# Patient Record
Sex: MALE | Race: WHITE | NOT HISPANIC OR LATINO | Employment: OTHER | URBAN - METROPOLITAN AREA
[De-identification: names, ages, dates, MRNs, and addresses within clinical notes are randomized per-mention and may not be internally consistent; named-entity substitution may affect disease eponyms.]

---

## 2018-01-29 ENCOUNTER — HISTORICAL (OUTPATIENT)
Dept: LAB | Facility: HOSPITAL | Age: 83
End: 2018-01-29

## 2018-02-02 ENCOUNTER — HISTORICAL (OUTPATIENT)
Dept: ADMINISTRATIVE | Facility: HOSPITAL | Age: 83
End: 2018-02-02

## 2018-02-09 ENCOUNTER — HISTORICAL (OUTPATIENT)
Dept: ADMINISTRATIVE | Facility: HOSPITAL | Age: 83
End: 2018-02-09

## 2018-02-20 ENCOUNTER — HISTORICAL (OUTPATIENT)
Dept: ADMINISTRATIVE | Facility: HOSPITAL | Age: 83
End: 2018-02-20

## 2018-02-27 ENCOUNTER — HISTORICAL (OUTPATIENT)
Dept: ADMINISTRATIVE | Facility: HOSPITAL | Age: 83
End: 2018-02-27

## 2018-03-06 ENCOUNTER — HISTORICAL (OUTPATIENT)
Dept: ADMINISTRATIVE | Facility: HOSPITAL | Age: 83
End: 2018-03-06

## 2018-03-13 ENCOUNTER — HISTORICAL (OUTPATIENT)
Dept: ADMINISTRATIVE | Facility: HOSPITAL | Age: 83
End: 2018-03-13

## 2018-03-23 ENCOUNTER — HISTORICAL (OUTPATIENT)
Dept: ADMINISTRATIVE | Facility: HOSPITAL | Age: 83
End: 2018-03-23

## 2018-03-26 LAB — FINAL CULTURE: NORMAL

## 2018-03-29 ENCOUNTER — HISTORICAL (OUTPATIENT)
Dept: ADMINISTRATIVE | Facility: HOSPITAL | Age: 83
End: 2018-03-29

## 2018-04-05 ENCOUNTER — HISTORICAL (OUTPATIENT)
Dept: ADMINISTRATIVE | Facility: HOSPITAL | Age: 83
End: 2018-04-05

## 2018-04-12 ENCOUNTER — HISTORICAL (OUTPATIENT)
Dept: ADMINISTRATIVE | Facility: HOSPITAL | Age: 83
End: 2018-04-12

## 2018-04-19 ENCOUNTER — HISTORICAL (OUTPATIENT)
Dept: ADMINISTRATIVE | Facility: HOSPITAL | Age: 83
End: 2018-04-19

## 2018-04-25 ENCOUNTER — HISTORICAL (OUTPATIENT)
Dept: ADMINISTRATIVE | Facility: HOSPITAL | Age: 83
End: 2018-04-25

## 2018-05-03 ENCOUNTER — HISTORICAL (OUTPATIENT)
Dept: ADMINISTRATIVE | Facility: HOSPITAL | Age: 83
End: 2018-05-03

## 2018-05-10 ENCOUNTER — HISTORICAL (OUTPATIENT)
Dept: ADMINISTRATIVE | Facility: HOSPITAL | Age: 83
End: 2018-05-10

## 2018-05-17 ENCOUNTER — HISTORICAL (OUTPATIENT)
Dept: ADMINISTRATIVE | Facility: HOSPITAL | Age: 83
End: 2018-05-17

## 2018-05-24 ENCOUNTER — HISTORICAL (OUTPATIENT)
Dept: ADMINISTRATIVE | Facility: HOSPITAL | Age: 83
End: 2018-05-24

## 2018-05-31 ENCOUNTER — HISTORICAL (OUTPATIENT)
Dept: ADMINISTRATIVE | Facility: HOSPITAL | Age: 83
End: 2018-05-31

## 2018-06-07 ENCOUNTER — HISTORICAL (OUTPATIENT)
Dept: ADMINISTRATIVE | Facility: HOSPITAL | Age: 83
End: 2018-06-07

## 2018-06-14 ENCOUNTER — HISTORICAL (OUTPATIENT)
Dept: ADMINISTRATIVE | Facility: HOSPITAL | Age: 83
End: 2018-06-14

## 2018-06-22 ENCOUNTER — HISTORICAL (OUTPATIENT)
Dept: ADMINISTRATIVE | Facility: HOSPITAL | Age: 83
End: 2018-06-22

## 2018-06-28 ENCOUNTER — HISTORICAL (OUTPATIENT)
Dept: ADMINISTRATIVE | Facility: HOSPITAL | Age: 83
End: 2018-06-28

## 2018-07-05 ENCOUNTER — HISTORICAL (OUTPATIENT)
Dept: ADMINISTRATIVE | Facility: HOSPITAL | Age: 83
End: 2018-07-05

## 2018-07-12 ENCOUNTER — HISTORICAL (OUTPATIENT)
Dept: ADMINISTRATIVE | Facility: HOSPITAL | Age: 83
End: 2018-07-12

## 2018-07-19 ENCOUNTER — HISTORICAL (OUTPATIENT)
Dept: ADMINISTRATIVE | Facility: HOSPITAL | Age: 83
End: 2018-07-19

## 2018-07-26 ENCOUNTER — HISTORICAL (OUTPATIENT)
Dept: ADMINISTRATIVE | Facility: HOSPITAL | Age: 83
End: 2018-07-26

## 2018-08-02 ENCOUNTER — HISTORICAL (OUTPATIENT)
Dept: ADMINISTRATIVE | Facility: HOSPITAL | Age: 83
End: 2018-08-02

## 2018-08-09 ENCOUNTER — HISTORICAL (OUTPATIENT)
Dept: ADMINISTRATIVE | Facility: HOSPITAL | Age: 83
End: 2018-08-09

## 2018-08-17 ENCOUNTER — HISTORICAL (OUTPATIENT)
Dept: ADMINISTRATIVE | Facility: HOSPITAL | Age: 83
End: 2018-08-17

## 2018-08-30 ENCOUNTER — HISTORICAL (OUTPATIENT)
Dept: ADMINISTRATIVE | Facility: HOSPITAL | Age: 83
End: 2018-08-30

## 2021-05-25 ENCOUNTER — HISTORICAL (OUTPATIENT)
Dept: ADMINISTRATIVE | Facility: HOSPITAL | Age: 86
End: 2021-05-25

## 2021-05-29 LAB — FINAL CULTURE: NORMAL

## 2021-06-10 ENCOUNTER — HISTORICAL (OUTPATIENT)
Dept: ADMINISTRATIVE | Facility: HOSPITAL | Age: 86
End: 2021-06-10

## 2021-06-14 LAB — FINAL CULTURE: NORMAL

## 2023-06-29 ENCOUNTER — HOSPITAL ENCOUNTER (OUTPATIENT)
Dept: WOUND CARE | Facility: HOSPITAL | Age: 88
Discharge: HOME | End: 2023-06-29
Attending: EMERGENCY MEDICINE
Payer: MEDICARE

## 2023-06-29 VITALS
WEIGHT: 199 LBS | HEIGHT: 72 IN | BODY MASS INDEX: 26.95 KG/M2 | RESPIRATION RATE: 16 BRPM | TEMPERATURE: 98 F | DIASTOLIC BLOOD PRESSURE: 71 MMHG | SYSTOLIC BLOOD PRESSURE: 130 MMHG | HEART RATE: 85 BPM

## 2023-06-29 DIAGNOSIS — I70.202 ATHEROSCLEROSIS OF LEFT LEG: ICD-10-CM

## 2023-06-29 DIAGNOSIS — Z79.01 CHRONIC ANTICOAGULATION: ICD-10-CM

## 2023-06-29 DIAGNOSIS — L97.222 CHRONIC ULCER OF LEFT CALF WITH FAT LAYER EXPOSED: ICD-10-CM

## 2023-06-29 DIAGNOSIS — A49.8 PSEUDOMONAS AERUGINOSA INFECTION: ICD-10-CM

## 2023-06-29 DIAGNOSIS — L97.922 CHRONIC ULCER OF LEFT LEG WITH FAT LAYER EXPOSED: ICD-10-CM

## 2023-06-29 DIAGNOSIS — I25.10 CORONARY ARTERIOSCLEROSIS: ICD-10-CM

## 2023-06-29 DIAGNOSIS — I10 HYPERTENSION, UNSPECIFIED TYPE: ICD-10-CM

## 2023-06-29 DIAGNOSIS — I87.332 CHRONIC VENOUS HYPERTENSION WITH ULCER AND INFLAMMATION, LEFT: ICD-10-CM

## 2023-06-29 DIAGNOSIS — D64.9 ANEMIA, UNSPECIFIED TYPE: ICD-10-CM

## 2023-06-29 PROCEDURE — 99205 OFFICE O/P NEW HI 60 MIN: CPT | Mod: 25,,, | Performed by: EMERGENCY MEDICINE

## 2023-06-29 PROCEDURE — 99205 PR OFFICE/OUTPT VISIT, NEW, LEVL V, 60-74 MIN: ICD-10-PCS | Mod: 25,,, | Performed by: EMERGENCY MEDICINE

## 2023-06-29 PROCEDURE — 27000999 HC MEDICAL RECORD PHOTO DOCUMENTATION

## 2023-06-29 PROCEDURE — 11042 DBRDMT SUBQ TIS 1ST 20SQCM/<: CPT

## 2023-06-29 PROCEDURE — 11042 DBRDMT SUBQ TIS 1ST 20SQCM/<: CPT | Mod: ,,, | Performed by: EMERGENCY MEDICINE

## 2023-06-29 PROCEDURE — 11042 DEBRIDEMENT: ICD-10-PCS | Mod: ,,, | Performed by: EMERGENCY MEDICINE

## 2023-06-29 RX ORDER — IPRATROPIUM BROMIDE AND ALBUTEROL SULFATE 2.5; .5 MG/3ML; MG/3ML
SOLUTION RESPIRATORY (INHALATION)
COMMUNITY
Start: 2023-05-16

## 2023-06-29 RX ORDER — CLOPIDOGREL BISULFATE 75 MG/1
75 TABLET ORAL
COMMUNITY
Start: 2023-06-14 | End: 2023-07-14

## 2023-06-29 RX ORDER — TRAMADOL HYDROCHLORIDE 50 MG/1
50 TABLET ORAL EVERY 8 HOURS PRN
COMMUNITY

## 2023-06-29 RX ORDER — VALSARTAN 320 MG/1
320 TABLET ORAL EVERY MORNING
COMMUNITY
Start: 2023-04-21

## 2023-06-29 RX ORDER — CEPHALEXIN 500 MG/1
500 CAPSULE ORAL EVERY 12 HOURS
COMMUNITY
Start: 2023-02-08 | End: 2023-08-11 | Stop reason: ALTCHOICE

## 2023-06-29 RX ORDER — PYRIDOXINE HCL (VITAMIN B6) 100 MG
1 TABLET ORAL EVERY MORNING
COMMUNITY

## 2023-06-29 RX ORDER — BUDESONIDE AND FORMOTEROL FUMARATE DIHYDRATE 160; 4.5 UG/1; UG/1
2 AEROSOL RESPIRATORY (INHALATION)
COMMUNITY

## 2023-06-29 RX ORDER — PANTOPRAZOLE SODIUM 40 MG/1
TABLET, DELAYED RELEASE ORAL
COMMUNITY
Start: 2023-06-10

## 2023-06-29 RX ORDER — LANOLIN ALCOHOL/MO/W.PET/CERES
1 CREAM (GRAM) TOPICAL EVERY MORNING
COMMUNITY

## 2023-06-29 RX ORDER — GENTAMICIN SULFATE 1 MG/G
OINTMENT TOPICAL DAILY
Qty: 15 G | Refills: 1 | Status: SHIPPED | OUTPATIENT
Start: 2023-06-29

## 2023-06-29 RX ORDER — CHOLECALCIFEROL (VITAMIN D3) 25 MCG
2 TABLET ORAL EVERY MORNING
COMMUNITY

## 2023-06-29 NOTE — PROCEDURES
"Debridement    Date/Time: 6/29/2023 2:07 PM  Performed by: Liliana Guo MD  Authorized by: Liliana Guo MD   Associated wounds:        Altered Skin Integrity 06/29/23 1334 Left anterior;lower Leg #1 Venous Ulcer       Altered Skin Integrity 06/29/23 1337 Left lower;posterior Calf #2 Venous Ulcer  Time out: Immediately prior to procedure a "time out" was called to verify the correct patient, procedure, equipment, support staff and site/side marked as required.    Consent Done?:  Yes (Written)    Preparation: Patient was prepped and draped in usual sterile fashion    Local anesthetic:  Topical anesthetic    Wound Details:    Location:  Left leg    Type of Debridement:  Excisional       Length (cm):  2.6       Area (sq cm):  5.2       Width (cm):  2       Percent Debrided (%):  100       Depth (cm):  0.3       Total Area Debrided (sq cm):  5.2    Depth of debridement:  Subcutaneous tissue    Tissue debrided:  Dermis, Epidermis and Subcutaneous    Devitalized tissue debrided:  Biofilm and Slough    Instruments:  Curette  Bleeding:  Minimal  Hemostasis Achieved: Yes  Method Used:  Pressure    2nd Wound Details:     Location:  Left leg (posterior)    Type of Debridement:  Excisional       Length (cm):  1.2       Area (sq cm):  1.2       Width (cm):  1       Depth (cm):  0.3    Depth of debridement:  Subcutaneous tissue    Tissue debrided:  Dermis, Epidermis and Subcutaneous    Devitalized tissue debrided:  Biofilm and Slough    Instruments:  Curette  Bleeding:  Minimal  Hemostasis Achieved: Yes    Method Used:  Pressure  Patient tolerance:  Patient tolerated the procedure well with no immediate complications  "

## 2023-06-29 NOTE — PROGRESS NOTES
Subjective:       Patient ID: Donovan Enriquez is a 88 y.o. male.    Chief Complaint: Non-healing Wound    NEW PATIENT      CC: LLE ulcers      88-year-old white male referred to this Wound Care Clinic from a rehab facility upon discharge a few days ago.   This patient has a history of hypertension, neuropathy,mixed PVD, lymphedema, CAD, cardiomegaly, asthma, and chronic Plavix therapy who was admitted to [facility] on 6/9/23 for  shortness of breath and hypoxia.  He was ultimately diagnosed with a GI bleed and severe anemia.  He was treated with 4 blood transfusions and a GI workup with upper scope and intervention.  He was then transferred to [facility] on 6/12/23 where he stayed though  6/26/23.  It was noted upon arrival to [facility] he had multiple LLE ulcers which have been present all of 2023, maybe longer.. +wound cx for Pseudomonas so was  given cipro 500mg bid on 6/25/23.  He was not sent home with home health.  Pt says long h/o BLE ulcers: says I treated on on RLE ankle area back in 2016.  Also last year, Dr Robert treated one on lateral left ankle ulcer. I looked in records which is very limited for old records since [facility] switched to [facility] last summer: I did find the following:    Search of Epic: note April 2022 by Dr ANKIT Higgins  lower extremity angiography and venogram:   Angiogram: PTA of the left tibioperoneal trunk, posterior tibial artery and anterior tibial artery.   Venogram :right-sided iliofemoral venous compression syndrome noted:  right external iliac vein, common iliac vein PTV and stenting  Did mention ulcers on left leg but pt does not think current ulcers    Declines home health and says his wife will do the wound care; son at bedside          Review of Systems   Constitutional: Negative.    HENT: Negative.     Respiratory: Negative.     Gastrointestinal: Negative.    Neurological: Negative.        Objective:      Vitals:    06/29/23 1328   BP: 130/71   Pulse: 85   Resp: 16    Temp: 97.7 °F (36.5 °C)     @poctglucose@  No results for input(s): POCTGLUCOSE in the last 24 hours.  Physical Exam  Vitals reviewed.   Constitutional:       Comments: Elderly ; no distress   HENT:      Head: Normocephalic and atraumatic.   Cardiovascular:      Pulses:           Dorsalis pedis pulses are 1+ on the right side and 1+ on the left side.   Pulmonary:      Effort: Pulmonary effort is normal.   Musculoskeletal:        Legs:    Skin:     Capillary Refill: Capillary refill takes less than 2 seconds.      Findings: No bruising or rash.   Neurological:      General: No focal deficit present.      Mental Status: He is alert and oriented to person, place, and time. Mental status is at baseline.            Altered Skin Integrity 06/29/23 1334 Left anterior;lower Leg #1 Venous Ulcer (Active)   06/29/23 1334   Altered Skin Integrity Present on Admission - Did Patient arrive to the hospital with altered skin?: yes   Side: Left   Orientation: anterior;lower   Location: Leg   Wound Number: #1   Is this injury device related?: No   Primary Wound Type: Venous ulcer   Description of Altered Skin Integrity:    Ankle-Brachial Index: Done 6/29/23 R DP: 0.98 PT: 1.04/ L DP: 0.89  PT:  0.85   Pulses: Palpable X2; + Doppler- biphasic   Removal Indication and Assessment:    Wound Outcome:    (Retired) Wound Length (cm):    (Retired) Wound Width (cm):    (Retired) Depth (cm):    Wound Description (Comments):    Removal Indications:    Wound Image   06/29/23 1338   Dressing Appearance Intact;Moist drainage 06/29/23 1338   Drainage Amount Moderate 06/29/23 1338   Drainage Characteristics/Odor Serosanguineous;No odor 06/29/23 1338   Appearance Pink;Yellow 06/29/23 1338   Tissue loss description Partial thickness 06/29/23 1338   Black (%), Wound Tissue Color 100 % 06/29/23 1338   Red (%), Wound Tissue Color 80 % 06/29/23 1338   Yellow (%), Wound Tissue Color 20 % 06/29/23 1338   Periwound Area Intact;Dry 06/29/23 1338   Wound  Edges Irregular 06/29/23 1338   Wound Length (cm) 2.6 cm 06/29/23 1338   Wound Width (cm) 2 cm 06/29/23 1338   Wound Depth (cm) 0.3 cm 06/29/23 1338   Wound Volume (cm^3) 1.56 cm^3 06/29/23 1338   Wound Surface Area (cm^2) 5.2 cm^2 06/29/23 1338   Care Cleansed with:;Wound cleanser;Applied: 06/29/23 1338   Dressing Applied 06/29/23 1338            Altered Skin Integrity 06/29/23 1337 Left lower;posterior Calf #2 Venous Ulcer (Active)   06/29/23 1337   Altered Skin Integrity Present on Admission - Did Patient arrive to the hospital with altered skin?: yes   Side: Left   Orientation: lower;posterior   Location: Calf   Wound Number: #2   Is this injury device related?: No   Primary Wound Type: Venous ulcer   Description of Altered Skin Integrity:    Ankle-Brachial Index: Done 6/29/23 R DP: 0.98 PT: 1.04/ L DP: 0.89  PT:  0.85   Pulses: palpable x2; + Doppler- biphasic x4   Removal Indication and Assessment:    Wound Outcome:    (Retired) Wound Length (cm):    (Retired) Wound Width (cm):    (Retired) Depth (cm):    Wound Description (Comments):    Removal Indications:    Wound Image   06/29/23 1338   Dressing Appearance Intact;Moist drainage 06/29/23 1338   Drainage Amount Moderate 06/29/23 1338   Drainage Characteristics/Odor Serosanguineous;No odor 06/29/23 1338   Appearance Yellow 06/29/23 1338   Tissue loss description Partial thickness 06/29/23 1338   Black (%), Wound Tissue Color 0 % 06/29/23 1338   Red (%), Wound Tissue Color 0 % 06/29/23 1338   Yellow (%), Wound Tissue Color 100 % 06/29/23 1338   Periwound Area Intact;Dry 06/29/23 1338   Wound Edges Defined 06/29/23 1338   Wound Length (cm) 1.2 cm 06/29/23 1338   Wound Width (cm) 1 cm 06/29/23 1338   Wound Depth (cm) 0.3 cm 06/29/23 1338   Wound Volume (cm^3) 0.36 cm^3 06/29/23 1338   Wound Surface Area (cm^2) 1.2 cm^2 06/29/23 1338   Care Cleansed with:;Wound cleanser;Applied: 06/29/23 1338   Dressing Applied 06/29/23 1338           Assessment:       1. Chronic  ulcer of left leg with fat layer exposed    2. 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 Pseudomonas aeruginosa infection    4. Chronic venous hypertension with ulcer and inflammation, left    5. Atherosclerosis of left leg    6. Anemia, unspecified type    7. Chronic anticoagulation    8. Hypertension, unspecified type    9. Coronary arteriosclerosis          LLE ulcers/chronic since late 2022 per patient: first clinic visit 6/29/23  Anemia with severe GI Bleed early June 2023: treated at [facility]: multiple blood transfusions, upper GI with intervention  Mixed PVD: treated by Dr Higgins: 2022 Lower extremity angiography and venogram because of chronic LLE ulcers; h/o ulcer both legs/ankles in past   Angiogram: PTA of the left tibioperoneal trunk, posterior tibial artery and anterior tibial artery.   Venogram :right-sided iliofemoral venous compression syndrome noted:  right external iliac vein, common iliac vein PTV and stenting  Anemia with severe GI Bleed early June 2023: treated at [facility]: multiple blood transfusions, upper GI with intervention  Chronic plavix therapy  CAD  Asthma  Hypertension      Lab Results   Component Value Date    WBC 7.9 09/04/2020    HGB 9.4 (L) 06/09/2023    HCT 27.8 (L) 06/09/2023    .1 (H) 09/04/2020     09/04/2020         CMP  Sodium   Date Value Ref Range Status   06/09/2023 137 136 - 145 mmol/L Final     Potassium   Date Value Ref Range Status   06/09/2023 4.1 3.5 - 5.1 mmol/L Final     Chloride   Date Value Ref Range Status   06/09/2023 111 (H) 100 - 109 mmol/L Final     Carbon Dioxide   Date Value Ref Range Status   06/09/2023 21 (L) 22 - 33 mmol/L Final     Blood Urea Nitrogen   Date Value Ref Range Status   06/09/2023 20 5 - 25 mg/dL Final     Creatinine   Date Value Ref Range Status   06/09/2023 0.68 0.57 - 1.25 mg/dL Final     Calcium   Date Value Ref Range Status   06/09/2023 7.5 (L) 8.8 - 10.6 mg/dL Final     Albumin Level   Date Value Ref Range  Status   09/04/2020 3.2 (L) 3.4 - 4.8 gm/dL Final     Bilirubin Total   Date Value Ref Range Status   09/04/2020 0.8 <<=1.5 mg/dL Final     Alkaline Phosphatase   Date Value Ref Range Status   09/04/2020 40 40 - 150 unit/L Final     Aspartate Aminotransferase   Date Value Ref Range Status   09/04/2020 30 5 - 34 unit/L Final     Alanine Aminotransferase   Date Value Ref Range Status   09/04/2020 27 0 - 55 unit/L Final     Anion Gap   Date Value Ref Range Status   06/09/2023 5 (L) 8 - 16 mmol/L Final     Lab Results   Component Value Date    HGBA1C 5.2 09/02/2020    HGBA1C 5.3 09/02/2020     No results found for: SEDRATE  No results found for: CRP    Plan:     Plan of Care:    Pt sent here from recent rehab facility where he stayed after he was found to be weak and deconditioned compounded by a GI bleed which required 4 untis of blood and an endoscopy/intervention  Pt with long h/o mixed PVD ulcers. Current set on LLE present since late 2022  Give cipro recently at rehab based on a pseudomonas cx (don't know which ulcer)  I need to get patient back to [facility] to recheck his vasculature and make sure optimized for healing  Debrided both ulcer  Wound Care Orders: dressings of gentamicin ointmetn (will send in rx) under moistened hydrofera blue; every other day; declines HH   Nutrition: Must have a high protein diet to support wound  healing; (if renal disease, see nephrologist for amount allowed):  this should be over 100g protein /day (if no kidney issues); Also rec MVI along with vit C, vit D, zinc and Uri  Compression: not yet; don't know arterial status  Return to clinic 1 week           The time spent including preparing to see the patient, obtaining patient history and assessment, evaluation of the plan of care, patient/caregiver counseling and education, orders, documentation, coordination of care, and other professional medical management activities for today's encounter was 60 minutes.    Time spent performing  procedures during today's encounter was 12 minutes.

## 2023-06-29 NOTE — PATIENT INSTRUCTIONS
Pt seen today by: Dr. Perez    Supplies orderd on 6/29/2023 from [employer]    Self care DRESSING INSTRUCTIONS:  Dressings to be changed Every Other Day and as needed if soiled or not intact.  Patient will be seen in this clinic on Thursdays.  Patient and/or family may be asked to assist on other days.      Wound location:   Cleanse wound with wound cleanser or saline  Apply gentimycin ointment covered by NS Moistened hydra fera blue to the wound bed  Cover with exudry or ABD pad and secure with kerix and cover-all tape.    Apply lotion to the skin too feet.    Compression with: N/A    Return visit:  Thursday, July 6, 2023 at 2:00 pm.    Wound may have been debrided in clinic: if so, WHAT YOU NEED TO KNOW:  Debridement is the removal of infected, damaged, or dead tissue so a wound can heal properly. Your wound may need more than one debridement. Debridement can cause bleeding, and a small amount of blood is expected.  AFTER A DEBRIDEMENT:  Keep your wound clean and dry. Do not remove the dressing unless instructed.  Follow the wound care orders provided to you or your home health care provider.  If you have pain, take over the counter pain relievers or pain medication if prescribed.  Elevate the wound and limit excessive activity to prevent bleeding and/or swelling in your wound.  If you see blood coming through the dressing, apply gauze and tape over the dressing and hold firm pressure to the wound with your hand for 5-10 minutes continuously, without peeking, to help the bleeding stop.    Contact [facility] wound care team at [phone] or go to the nearest Emergency department if:  You have a fever greater than 101 taken by mouth.  Your pain gets worse or does not go away, even after taking your regular pain medicine.  Your skin around your wound is red, hot, swollen, or draining pus.  You have bleeding that continues to come through the dressing after holding pressure for 10 minutes     Nutrition:  The current daily  value (%DV) for protein is 50 grams per day and is meant as a general goal for most people. Further increasing your dietary protein intake is very important for wound healing. Typically one needs over 100g of protein per day to help with wound healing needs.  If you are a dialysis patient or have problems with your kidneys, talk to your Nephrologist about how much protein you can take in with your condition.  Examples of high protein items that can be added to your diet include: eggs, chicken, red meats, almonds, cottage cheese, Greek yogurt, beans, and peanut butter.  Fortified protein bars, shakes and drinks can add 15-30 additional grams of protein per serving.   Also add:   1 daily general multivitamin   Uri : 1 packet twice daily   Vitamin C : 500mg twice daily   Zinc 220 mg daily  Vit D : once daily    Offloading:  Offload your wound. This means to reduce pressure on and around the wound that reduces blood flow to the wound and prevents healing. Your wound care team will discuss specific ways for you to offload your specific wound. Common offloading strategies include:  Turn or reposition every 2 hours or sooner  Use pillows, wedges, ROHO wheelchair cushions or other special devices like boots and shoes to lift the wound off of hard surfaces  Alternating Low Air-loss (ALAL) mattress may be ordered  Padded dressings can reduce wound pressure      Call our [facility] wound clinic for questions/concerns a [phone] .

## 2023-07-06 ENCOUNTER — HOSPITAL ENCOUNTER (OUTPATIENT)
Dept: WOUND CARE | Facility: HOSPITAL | Age: 88
Discharge: HOME | End: 2023-07-06
Attending: EMERGENCY MEDICINE
Payer: MEDICARE

## 2023-07-06 VITALS
WEIGHT: 199 LBS | DIASTOLIC BLOOD PRESSURE: 69 MMHG | TEMPERATURE: 98 F | RESPIRATION RATE: 16 BRPM | SYSTOLIC BLOOD PRESSURE: 137 MMHG | HEIGHT: 72 IN | BODY MASS INDEX: 26.95 KG/M2 | HEART RATE: 79 BPM

## 2023-07-06 DIAGNOSIS — D64.9 ANEMIA, UNSPECIFIED TYPE: ICD-10-CM

## 2023-07-06 DIAGNOSIS — I87.332 CHRONIC VENOUS HYPERTENSION WITH ULCER AND INFLAMMATION, LEFT: ICD-10-CM

## 2023-07-06 DIAGNOSIS — I10 HYPERTENSION, UNSPECIFIED TYPE: ICD-10-CM

## 2023-07-06 DIAGNOSIS — I70.202 ATHEROSCLEROSIS OF LEFT LEG: ICD-10-CM

## 2023-07-06 DIAGNOSIS — A49.8 PSEUDOMONAS AERUGINOSA INFECTION: ICD-10-CM

## 2023-07-06 DIAGNOSIS — L97.222 CHRONIC ULCER OF LEFT CALF WITH FAT LAYER EXPOSED: ICD-10-CM

## 2023-07-06 DIAGNOSIS — Z79.01 CHRONIC ANTICOAGULATION: ICD-10-CM

## 2023-07-06 DIAGNOSIS — L97.922 CHRONIC ULCER OF LEFT LEG WITH FAT LAYER EXPOSED: ICD-10-CM

## 2023-07-06 DIAGNOSIS — I25.10 CORONARY ARTERIOSCLEROSIS: ICD-10-CM

## 2023-07-06 PROCEDURE — 99499 NO LOS: ICD-10-PCS | Mod: ,,, | Performed by: EMERGENCY MEDICINE

## 2023-07-06 PROCEDURE — 11042 DEBRIDEMENT: ICD-10-PCS | Mod: ,,, | Performed by: EMERGENCY MEDICINE

## 2023-07-06 PROCEDURE — 11042 DBRDMT SUBQ TIS 1ST 20SQCM/<: CPT

## 2023-07-06 PROCEDURE — 11042 DBRDMT SUBQ TIS 1ST 20SQCM/<: CPT | Mod: ,,, | Performed by: EMERGENCY MEDICINE

## 2023-07-06 PROCEDURE — 27000999 HC MEDICAL RECORD PHOTO DOCUMENTATION

## 2023-07-06 PROCEDURE — 99499 UNLISTED E&M SERVICE: CPT | Mod: ,,, | Performed by: EMERGENCY MEDICINE

## 2023-07-06 NOTE — PROCEDURES
Debridement    Date/Time: 7/6/2023 2:30 PM  Performed by: Liliana Guo MD  Authorized by: Liliana Guo MD   Associated wounds:        Altered Skin Integrity 07/06/23 1416 Left anterior;lower Leg #1 Venous Ulcer       Altered Skin Integrity 07/06/23 1417 Left lower;posterior Calf #2 Venous Ulcer  Consent Done?:  Yes (Written)  Local anesthesia used?: Yes      Wound Details:    Location:  Left leg    Type of Debridement:  Excisional       Length (cm):  2.5       Area (sq cm):  4.5       Width (cm):  1.8       Percent Debrided (%):  100       Depth (cm):  0.2       Total Area Debrided (sq cm):  4.5    Depth of debridement:  Subcutaneous tissue    Tissue debrided:  Dermis, Epidermis and Subcutaneous    Devitalized tissue debrided:  Biofilm and Slough    Instruments:  Curette  Bleeding:  Minimal  Hemostasis Achieved: Yes  Method Used:  Pressure    2nd Wound Details:     Location:  Left leg (calf)    Type of Debridement:  Excisional       Length (cm):  1.2       Area (sq cm):  0.96       Width (cm):  0.8       Percent Debrided (%):  100       Depth (cm):  0.2       Total Area Debrided (sq cm):  0.96    Depth of debridement:  Subcutaneous tissue    Tissue debrided:  Epidermis, Subcutaneous and Dermis    Devitalized tissue debrided:  Slough and Biofilm    Instruments:  Curette  Bleeding:  Minimal  Hemostasis Achieved: Yes    Method Used:  Pressure  Patient tolerance:  Patient tolerated the procedure well with no immediate complications

## 2023-07-06 NOTE — PROGRESS NOTES
Subjective:       Patient ID: Donovan Enriquez is a 88 y.o. male.    Chief Complaint: No chief complaint on file.      CC: LLE ulcers      87 y/o WM referred to this Wound Care Clinic from a rehab facility. This patient has a history of hypertension, neuropathy,mixed PVD with ulcers, lymphedema, CAD, cardiomegaly, asthma on chronic  Plavix who was admitted to [facility] on 6/9/23 for shortness of breath and hypoxia.  He was ultimately diagnosed with a GI bleed and severe anemia.  He was treated with 4 blood transfusions and a GI workup with upper scope and intervention.  He was then transferred to [facility] on 6/12/23 where he stayed though  6/26/23.  It was noted upon arrival to [facility] he had multiple LLE ulcers which had been present all of 2023, maybe longer.. +wound cx for Pseudomonas so was  given cipro 500mg bid on 6/25/23.   Dr Higgins has done both angiograms and venograms in the past.   Search of Epic: note April 2022 by Dr ANKIT Higgins    Angiogram: PTA of the left tibioperoneal trunk, posterior tibial artery and anterior tibial artery.   Venogram :right-sided iliofemoral venous compression syndrome noted:  right external iliac vein, common iliac vein PTV and stenting    I debrided and advised of dressings of gentamicin ointment under moistened hydrofera blue; And referred back to Gisela. Pt declined home health and said his wife would do the dressings.  He returns today on 7/6/23 for recheck. No complaints. Says has shell scheduled with Gisela        Review of Systems   Constitutional: Negative.    HENT: Negative.     Respiratory: Negative.     Gastrointestinal: Negative.    Neurological: Negative.        Objective:      Vitals:    07/06/23 1359   BP: 137/69   Pulse: 79   Resp: 16   Temp: 97.8 °F (36.6 °C)     @poctglucose@  No results for input(s): POCTGLUCOSE in the last 24 hours.  Physical Exam  Vitals reviewed.   Constitutional:       Comments: Elderly ; no distress   HENT:      Head:  Normocephalic and atraumatic.   Cardiovascular:      Pulses:           Dorsalis pedis pulses are 1+ on the right side and 1+ on the left side.   Pulmonary:      Effort: Pulmonary effort is normal.   Musculoskeletal:        Legs:    Skin:     Capillary Refill: Capillary refill takes less than 2 seconds.      Findings: No bruising or rash.   Neurological:      General: No focal deficit present.      Mental Status: He is alert and oriented to person, place, and time. Mental status is at baseline.            Altered Skin Integrity 07/06/23 1416 Left anterior;lower Leg #1 Venous Ulcer (Active)   07/06/23 1416   Altered Skin Integrity Present on Admission - Did Patient arrive to the hospital with altered skin?: yes   Side: Left   Orientation: anterior;lower   Location: Leg   Wound Number: #1   Is this injury device related?: No   Primary Wound Type: Venous ulcer   Description of Altered Skin Integrity:    Ankle-Brachial Index: Done 6/29/23 R DP: 0.98 PT: 1.04/ L DP: 0.89  PT:  0.85   Pulses: Palpable X2; + Doppler- biphasic   Removal Indication and Assessment:    Wound Outcome:    (Retired) Wound Length (cm):    (Retired) Wound Width (cm):    (Retired) Depth (cm):    Wound Description (Comments):    Removal Indications:    Wound Image   07/06/23 1417   Dressing Appearance Intact;Moist drainage 07/06/23 1417   Drainage Amount Moderate 07/06/23 1417   Drainage Characteristics/Odor Yellow 07/06/23 1417   Appearance Yellow;Pink 07/06/23 1417   Tissue loss description Full thickness 07/06/23 1417   Black (%), Wound Tissue Color 0 % 07/06/23 1417   Red (%), Wound Tissue Color 40 % 07/06/23 1417   Yellow (%), Wound Tissue Color 60 % 07/06/23 1417   Periwound Area Intact;Dry 07/06/23 1417   Wound Edges Defined 07/06/23 1417   Wound Length (cm) 2.5 cm 07/06/23 1417   Wound Width (cm) 1.8 cm 07/06/23 1417   Wound Depth (cm) 0.2 cm 07/06/23 1417   Wound Volume (cm^3) 0.9 cm^3 07/06/23 1417   Wound Surface Area (cm^2) 4.5 cm^2  07/06/23 1417   Care Cleansed with:;Soap and water;Antimicrobial agent;Applied: 07/06/23 1417            Altered Skin Integrity 07/06/23 1417 Left lower;posterior Calf #2 Venous Ulcer (Active)   07/06/23 1417   Altered Skin Integrity Present on Admission - Did Patient arrive to the hospital with altered skin?: yes   Side: Left   Orientation: lower;posterior   Location: Calf   Wound Number: #2   Is this injury device related?: No   Primary Wound Type: Venous ulcer   Description of Altered Skin Integrity:    Ankle-Brachial Index: Done 6/29/23 R DP: 0.98 PT: 1.04/ L DP: 0.89  PT:  0.85   Pulses: palpable x2; + Doppler- biphasic x4   Removal Indication and Assessment:    Wound Outcome:    (Retired) Wound Length (cm):    (Retired) Wound Width (cm):    (Retired) Depth (cm):    Wound Description (Comments):    Removal Indications:    Wound Image   07/06/23 1417   Dressing Appearance Dry;Moist drainage 07/06/23 1417   Drainage Amount Small 07/06/23 1417   Drainage Characteristics/Odor Yellow;No odor 07/06/23 1417   Appearance Yellow 07/06/23 1417   Tissue loss description Full thickness 07/06/23 1417   Black (%), Wound Tissue Color 0 % 07/06/23 1417   Red (%), Wound Tissue Color 0 % 07/06/23 1417   Yellow (%), Wound Tissue Color 100 % 07/06/23 1417   Periwound Area Intact;Dry 07/06/23 1417   Wound Edges Defined 07/06/23 1417   Wound Length (cm) 1.2 cm 07/06/23 1417   Wound Width (cm) 0.8 cm 07/06/23 1417   Wound Depth (cm) 0.2 cm 07/06/23 1417   Wound Volume (cm^3) 0.192 cm^3 07/06/23 1417   Wound Surface Area (cm^2) 0.96 cm^2 07/06/23 1417   Care Cleansed with:;Soap and water;Antimicrobial agent;Applied: 07/06/23 1417           Assessment:       1. 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 Pseudomonas aeruginosa infection    4. Chronic venous hypertension with ulcer and inflammation, left    5. Atherosclerosis of left leg    6. Anemia, unspecified type     7. Chronic anticoagulation    8. Hypertension, unspecified type    9. Coronary arteriosclerosis          LLE ulcers/chronic since late 2022 per patient: first clinic visit 6/29/23  Anemia with severe GI Bleed early June 2023: treated at [facility]: multiple blood transfusions, upper GI with intervention  Mixed PVD: treated by Dr Higgins: 2022 Lower extremity angiography and venogram because of chronic LLE ulcers; h/o ulcer both legs/ankles in past   Angiogram: PTA of the left tibioperoneal trunk, posterior tibial artery and anterior tibial artery.   Venogram :right-sided iliofemoral venous compression syndrome noted:  right external iliac vein, common iliac vein PTV and stenting  Anemia with severe GI Bleed early June 2023: treated at [facility]: multiple blood transfusions, upper GI with intervention  Chronic plavix therapy  CAD  Asthma  Hypertension      Lab Results   Component Value Date    WBC 7.9 09/04/2020    HGB 9.4 (L) 06/09/2023    HCT 27.8 (L) 06/09/2023    .1 (H) 09/04/2020     09/04/2020         CMP  Sodium   Date Value Ref Range Status   06/09/2023 137 136 - 145 mmol/L Final     Potassium   Date Value Ref Range Status   06/09/2023 4.1 3.5 - 5.1 mmol/L Final     Chloride   Date Value Ref Range Status   06/09/2023 111 (H) 100 - 109 mmol/L Final     Carbon Dioxide   Date Value Ref Range Status   06/09/2023 21 (L) 22 - 33 mmol/L Final     Blood Urea Nitrogen   Date Value Ref Range Status   06/09/2023 20 5 - 25 mg/dL Final     Creatinine   Date Value Ref Range Status   06/09/2023 0.68 0.57 - 1.25 mg/dL Final     Calcium   Date Value Ref Range Status   06/09/2023 7.5 (L) 8.8 - 10.6 mg/dL Final     Albumin Level   Date Value Ref Range Status   09/04/2020 3.2 (L) 3.4 - 4.8 gm/dL Final     Bilirubin Total   Date Value Ref Range Status   09/04/2020 0.8 <<=1.5 mg/dL Final     Alkaline Phosphatase   Date Value Ref Range Status   09/04/2020 40 40 - 150 unit/L Final     Aspartate Aminotransferase   Date Value  Ref Range Status   09/04/2020 30 5 - 34 unit/L Final     Alanine Aminotransferase   Date Value Ref Range Status   09/04/2020 27 0 - 55 unit/L Final     Anion Gap   Date Value Ref Range Status   06/09/2023 5 (L) 8 - 16 mmol/L Final     Lab Results   Component Value Date    HGBA1C 5.2 09/02/2020    HGBA1C 5.3 09/02/2020     No results found for: SEDRATE  No results found for: CRP    Plan:     Plan of Care:    Debrided both ulcers  Wound Care Orders: dressings of gentamicin ointmetn (will send in rx) under moistened hydrofera blue; every other day; declines HH   Nutrition: Must have a high protein diet to support wound  healing; (if renal disease, see nephrologist for amount allowed):  this should be over 100g protein /day (if no kidney issues); Also rec MVI along with vit C, vit D, zinc and Uri  Compression: not yet; don't know arterial status; has shell with Gisela now   Return to clinic 1 week

## 2023-07-06 NOTE — PATIENT INSTRUCTIONS
Pt seen today by: Dr. Perez    Supplies orderd on 6/29/2023 from [facility]    Self care DRESSING INSTRUCTIONS:      Wound location:   Cleanse wound with wound cleanser or saline  Apply gentimycin ointment covered by NS Moistened hydra fera blue to the wound bed  Cover with exudry or ABD pad and secure with kerix and cover-all tape  Dressings to be changed Every Other Day and as needed if soiled or not intact    Moisturize dry skin on feet    Compression with: N/A    Return visit:  Thursday, July 13, 2023 at 1:00 pm.    Wound may have been debrided in clinic: if so, WHAT YOU NEED TO KNOW:  Debridement is the removal of infected, damaged, or dead tissue so a wound can heal properly. Your wound may need more than one debridement. Debridement can cause bleeding, and a small amount of blood is expected.  AFTER A DEBRIDEMENT:  Keep your wound clean and dry. Do not remove the dressing unless instructed.  Follow the wound care orders provided to you or your home health care provider.  If you have pain, take over the counter pain relievers or pain medication if prescribed.  Elevate the wound and limit excessive activity to prevent bleeding and/or swelling in your wound.  If you see blood coming through the dressing, apply gauze and tape over the dressing and hold firm pressure to the wound with your hand for 5-10 minutes continuously, without peeking, to help the bleeding stop.    Contact [facility] wound care team at [phone] or go to the nearest Emergency department if:  You have a fever greater than 101 taken by mouth.  Your pain gets worse or does not go away, even after taking your regular pain medicine.  Your skin around your wound is red, hot, swollen, or draining pus.  You have bleeding that continues to come through the dressing after holding pressure for 10 minutes     Nutrition:  The current daily value (%DV) for protein is 50 grams per day and is meant as a general goal for most people. Further increasing your  dietary protein intake is very important for wound healing. Typically one needs over 100g of protein per day to help with wound healing needs.  If you are a dialysis patient or have problems with your kidneys, talk to your Nephrologist about how much protein you can take in with your condition.  Examples of high protein items that can be added to your diet include: eggs, chicken, red meats, almonds, cottage cheese, Greek yogurt, beans, and peanut butter.  Fortified protein bars, shakes and drinks can add 15-30 additional grams of protein per serving.   Also add:   1 daily general multivitamin   Uri : 1 packet twice daily   Vitamin C : 500mg twice daily   Zinc 220 mg daily  Vit D : once daily    Offloading:  Offload your wound. This means to reduce pressure on and around the wound that reduces blood flow to the wound and prevents healing. Your wound care team will discuss specific ways for you to offload your specific wound. Common offloading strategies include:  Turn or reposition every 2 hours or sooner  Use pillows, wedges, ROHO wheelchair cushions or other special devices like boots and shoes to lift the wound off of hard surfaces  Alternating Low Air-loss (ALAL) mattress may be ordered  Padded dressings can reduce wound pressure      Call our [facility] wound clinic for questions/concerns a [phone] .

## 2023-07-13 ENCOUNTER — HOSPITAL ENCOUNTER (OUTPATIENT)
Dept: WOUND CARE | Facility: HOSPITAL | Age: 88
Discharge: HOME | End: 2023-07-13
Attending: EMERGENCY MEDICINE
Payer: MEDICARE

## 2023-07-13 VITALS
BODY MASS INDEX: 26.95 KG/M2 | DIASTOLIC BLOOD PRESSURE: 71 MMHG | RESPIRATION RATE: 16 BRPM | WEIGHT: 199 LBS | TEMPERATURE: 98 F | HEIGHT: 72 IN | HEART RATE: 84 BPM | SYSTOLIC BLOOD PRESSURE: 146 MMHG

## 2023-07-13 DIAGNOSIS — L97.222 CHRONIC ULCER OF LEFT CALF WITH FAT LAYER EXPOSED: ICD-10-CM

## 2023-07-13 DIAGNOSIS — A49.8 PSEUDOMONAS AERUGINOSA INFECTION: ICD-10-CM

## 2023-07-13 DIAGNOSIS — I10 HYPERTENSION, UNSPECIFIED TYPE: ICD-10-CM

## 2023-07-13 DIAGNOSIS — Z79.01 CHRONIC ANTICOAGULATION: ICD-10-CM

## 2023-07-13 DIAGNOSIS — I87.332 CHRONIC VENOUS HYPERTENSION WITH ULCER AND INFLAMMATION, LEFT: ICD-10-CM

## 2023-07-13 DIAGNOSIS — L97.922 CHRONIC ULCER OF LEFT LEG WITH FAT LAYER EXPOSED: ICD-10-CM

## 2023-07-13 DIAGNOSIS — D64.9 ANEMIA, UNSPECIFIED TYPE: ICD-10-CM

## 2023-07-13 DIAGNOSIS — I25.10 CORONARY ARTERIOSCLEROSIS: ICD-10-CM

## 2023-07-13 DIAGNOSIS — I70.202 ATHEROSCLEROSIS OF LEFT LEG: ICD-10-CM

## 2023-07-13 PROCEDURE — 99499 NO LOS: ICD-10-PCS | Mod: ,,, | Performed by: EMERGENCY MEDICINE

## 2023-07-13 PROCEDURE — 11042 DEBRIDEMENT: ICD-10-PCS | Mod: ,,, | Performed by: EMERGENCY MEDICINE

## 2023-07-13 PROCEDURE — 11042 DBRDMT SUBQ TIS 1ST 20SQCM/<: CPT | Mod: ,,, | Performed by: EMERGENCY MEDICINE

## 2023-07-13 PROCEDURE — 11042 DBRDMT SUBQ TIS 1ST 20SQCM/<: CPT

## 2023-07-13 PROCEDURE — 99499 UNLISTED E&M SERVICE: CPT | Mod: ,,, | Performed by: EMERGENCY MEDICINE

## 2023-07-13 PROCEDURE — 27000999 HC MEDICAL RECORD PHOTO DOCUMENTATION

## 2023-07-13 NOTE — PROGRESS NOTES
Subjective:       Patient ID: Donovan Enriquez is a 88 y.o. male.    Chief Complaint: Non-healing Wound Follow Up      CC: LLE ulcers      87 y/o WM referred to this Wound Care Clinic from a rehab facility. This patient has a history of hypertension, neuropathy,mixed PVD with ulcers, lymphedema, CAD, cardiomegaly, asthma on chronic  Plavix who was admitted to [facility] on 6/9/23 for shortness of breath and hypoxia.  He was ultimately diagnosed with a GI bleed and severe anemia.  He was treated with 4 blood transfusions and a GI workup with upper scope and intervention.  He was then transferred to [facility] on 6/12/23 where he stayed though  6/26/23.  It was noted upon arrival to [facility] he had multiple LLE ulcers which had been present all of 2023, maybe longer.. +wound cx for Pseudomonas so was  given cipro 500mg bid on 6/25/23.   Dr Higgins has done both angiograms and venograms in the past.   Search of Epic: note April 2022 by Dr ANKIT Higgins    Angiogram: PTA of the left tibioperoneal trunk, posterior tibial artery and anterior tibial artery.   Venogram :right-sided iliofemoral venous compression syndrome noted:  right external iliac vein, common iliac vein PTV and stenting    I debrided and advised of dressings of gentamicin ointment under moistened hydrofera blue; And referred back to Gisela. Pt declined home health and said his wife would do the dressings.have been seeing weekly since. To See Gisela soon ; no complaints        Review of Systems   Constitutional: Negative.    HENT: Negative.     Respiratory: Negative.     Gastrointestinal: Negative.    Neurological: Negative.        Objective:      Vitals:    07/13/23 1302   BP: (!) 146/71   Pulse: 84   Resp: 16   Temp: 97.8 °F (36.6 °C)     @poctglucose@  No results for input(s): POCTGLUCOSE in the last 24 hours.  Physical Exam  Vitals reviewed.   Constitutional:       Comments: Elderly ; no distress   HENT:      Head: Normocephalic and atraumatic.    Cardiovascular:      Pulses:           Dorsalis pedis pulses are 1+ on the right side and 1+ on the left side.   Pulmonary:      Effort: Pulmonary effort is normal.   Musculoskeletal:        Legs:    Skin:     Capillary Refill: Capillary refill takes less than 2 seconds.      Findings: No bruising or rash.   Neurological:      General: No focal deficit present.      Mental Status: He is alert and oriented to person, place, and time. Mental status is at baseline.            Altered Skin Integrity 07/06/23 1416 Left anterior;lower Leg #1 Venous Ulcer (Active)   07/06/23 1416   Altered Skin Integrity Present on Admission - Did Patient arrive to the hospital with altered skin?: yes   Side: Left   Orientation: anterior;lower   Location: Leg   Wound Number: #1   Is this injury device related?: No   Primary Wound Type: Venous ulcer   Description of Altered Skin Integrity:    Ankle-Brachial Index: Done 6/29/23 R DP: 0.98 PT: 1.04/ L DP: 0.89  PT:  0.85   Pulses: Palpable X2; + Doppler- biphasic   Removal Indication and Assessment:    Wound Outcome:    (Retired) Wound Length (cm):    (Retired) Wound Width (cm):    (Retired) Depth (cm):    Wound Description (Comments):    Removal Indications:    Wound Image   07/13/23 1313   Dressing Appearance Intact;Moist drainage 07/13/23 1313   Drainage Amount Moderate 07/13/23 1313   Drainage Characteristics/Odor Serosanguineous;No odor 07/13/23 1313   Appearance Pink 07/13/23 1313   Tissue loss description Partial thickness 07/13/23 1313   Black (%), Wound Tissue Color 0 % 07/13/23 1313   Red (%), Wound Tissue Color 100 % 07/13/23 1313   Yellow (%), Wound Tissue Color 0 % 07/13/23 1313   Periwound Area Intact;Dry 07/13/23 1313   Wound Edges Defined 07/13/23 1313   Wound Length (cm) 2.5 cm 07/13/23 1313   Wound Width (cm) 2 cm 07/13/23 1313   Wound Depth (cm) 0.4 cm 07/13/23 1313   Wound Volume (cm^3) 2 cm^3 07/13/23 1313   Wound Surface Area (cm^2) 5 cm^2 07/13/23 1313   Care Cleansed  with:;Soap and water;Applied: 07/13/23 1313   Dressing Applied 07/13/23 1313            Altered Skin Integrity 07/06/23 1417 Left lower;posterior Calf #2 Venous Ulcer (Active)   07/06/23 1417   Altered Skin Integrity Present on Admission - Did Patient arrive to the hospital with altered skin?: yes   Side: Left   Orientation: lower;posterior   Location: Calf   Wound Number: #2   Is this injury device related?: No   Primary Wound Type: Venous ulcer   Description of Altered Skin Integrity:    Ankle-Brachial Index: Done 6/29/23 R DP: 0.98 PT: 1.04/ L DP: 0.89  PT:  0.85   Pulses: palpable x2; + Doppler- biphasic x4   Removal Indication and Assessment:    Wound Outcome:    (Retired) Wound Length (cm):    (Retired) Wound Width (cm):    (Retired) Depth (cm):    Wound Description (Comments):    Removal Indications:    Wound Image   07/13/23 1313   Dressing Appearance Intact;Moist drainage 07/13/23 1313   Drainage Amount Moderate 07/13/23 1313   Drainage Characteristics/Odor Serosanguineous;No odor 07/13/23 1313   Appearance Pink;Yellow 07/13/23 1313   Tissue loss description Partial thickness 07/13/23 1313   Black (%), Wound Tissue Color 0 % 07/13/23 1313   Red (%), Wound Tissue Color 50 % 07/13/23 1313   Yellow (%), Wound Tissue Color 50 % 07/13/23 1313   Periwound Area Intact;Dry 07/13/23 1313   Wound Edges Defined 07/13/23 1313   Wound Length (cm) 0.9 cm 07/13/23 1313   Wound Width (cm) 0.8 cm 07/13/23 1313   Wound Depth (cm) 0.4 cm 07/13/23 1313   Wound Volume (cm^3) 0.288 cm^3 07/13/23 1313   Wound Surface Area (cm^2) 0.72 cm^2 07/13/23 1313   Care Cleansed with:;Soap and water;Applied: 07/13/23 1313   Dressing Applied 07/13/23 1313           Assessment:       1. 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 Pseudomonas aeruginosa infection    4. Chronic venous hypertension with ulcer and inflammation, left    5. Atherosclerosis of left leg    6. Anemia,  unspecified type    7. Chronic anticoagulation    8. Hypertension, unspecified type    9. Coronary arteriosclerosis          LLE ulcers/chronic since late 2022 per patient: first clinic visit 6/29/23  Anemia with severe GI Bleed early June 2023: treated at [facility]: multiple blood transfusions, upper GI with intervention  Mixed PVD: treated by Dr Higgins: 2022 Lower extremity angiography and venogram because of chronic LLE ulcers; h/o ulcer both legs/ankles in past   Angiogram: PTA of the left tibioperoneal trunk, posterior tibial artery and anterior tibial artery.   Venogram :right-sided iliofemoral venous compression syndrome noted:  right external iliac vein, common iliac vein PTV and stenting  Anemia with severe GI Bleed early June 2023: treated at [facility]: multiple blood transfusions, upper GI with intervention  Chronic plavix therapy  CAD  Asthma  Hypertension        Lab Results   Component Value Date    HGBA1C 5.2 09/02/2020    HGBA1C 5.3 09/02/2020     Plan:     Plan of Care:    Debrided both ulcers  Wound Care Orders: dressings of gentamicin ointment under moistened hydrofera blue; every other day; declines HH   Nutrition: Must have a high protein diet to support wound  healing; (if renal disease, see nephrologist for amount allowed):  this should be over 100g protein /day (if no kidney issues); Also rec MVI along with vit C, vit D, zinc and Uri  Compression: not yet; don't know arterial status; has shell with Gisela soon   Return to clinic 1 week

## 2023-07-13 NOTE — PATIENT INSTRUCTIONS
Pt seen today by: Dr. Perez    Supplies orderd on 6/29/2023 from [facility]    Self care DRESSING INSTRUCTIONS:      Wound location:   Cleanse wound with wound cleanser or saline  Apply gentimycin ointment covered by NS Moistened hydra fera blue to the wound bed  Cover with exudry or ABD pad and secure with kerix and cover-all tape  Dressings to be changed Every Other Day and as needed if soiled or not intact    Moisturize dry skin on feet    Compression with: N/A    Return visit:  Thursday, July 20, 2023 at 1:30 pm.    Wound may have been debrided in clinic: if so, WHAT YOU NEED TO KNOW:  Debridement is the removal of infected, damaged, or dead tissue so a wound can heal properly. Your wound may need more than one debridement. Debridement can cause bleeding, and a small amount of blood is expected.  AFTER A DEBRIDEMENT:  Keep your wound clean and dry. Do not remove the dressing unless instructed.  Follow the wound care orders provided to you or your home health care provider.  If you have pain, take over the counter pain relievers or pain medication if prescribed.  Elevate the wound and limit excessive activity to prevent bleeding and/or swelling in your wound.  If you see blood coming through the dressing, apply gauze and tape over the dressing and hold firm pressure to the wound with your hand for 5-10 minutes continuously, without peeking, to help the bleeding stop.    Contact [facility] wound care team at [phone] or go to the nearest Emergency department if:  You have a fever greater than 101 taken by mouth.  Your pain gets worse or does not go away, even after taking your regular pain medicine.  Your skin around your wound is red, hot, swollen, or draining pus.  You have bleeding that continues to come through the dressing after holding pressure for 10 minutes     Nutrition:  The current daily value (%DV) for protein is 50 grams per day and is meant as a general goal for most people. Further increasing your  dietary protein intake is very important for wound healing. Typically one needs over 100g of protein per day to help with wound healing needs.  If you are a dialysis patient or have problems with your kidneys, talk to your Nephrologist about how much protein you can take in with your condition.  Examples of high protein items that can be added to your diet include: eggs, chicken, red meats, almonds, cottage cheese, Greek yogurt, beans, and peanut butter.  Fortified protein bars, shakes and drinks can add 15-30 additional grams of protein per serving.   Also add:   1 daily general multivitamin   Uri : 1 packet twice daily   Vitamin C : 500mg twice daily   Zinc 220 mg daily  Vit D : once daily    Offloading:  Offload your wound. This means to reduce pressure on and around the wound that reduces blood flow to the wound and prevents healing. Your wound care team will discuss specific ways for you to offload your specific wound. Common offloading strategies include:  Turn or reposition every 2 hours or sooner  Use pillows, wedges, ROHO wheelchair cushions or other special devices like boots and shoes to lift the wound off of hard surfaces  Alternating Low Air-loss (ALAL) mattress may be ordered  Padded dressings can reduce wound pressure      Call our [facility] wound clinic for questions/concerns a [phone] .

## 2023-07-13 NOTE — PROCEDURES
"Debridement    Date/Time: 7/13/2023 1:40 PM  Performed by: Liliana Guo MD  Authorized by: Liliana Guo MD   Associated wounds:        Altered Skin Integrity 07/06/23 1416 Left anterior;lower Leg #1 Venous Ulcer       Altered Skin Integrity 07/06/23 1417 Left lower;posterior Calf #2 Venous Ulcer  Time out: Immediately prior to procedure a "time out" was called to verify the correct patient, procedure, equipment, support staff and site/side marked as required.    Consent Done?:  Yes (Written)  Local anesthesia used?: Yes    Local anesthetic:  Topical anesthetic    Wound Details:    Location:  Left leg    Type of Debridement:  Excisional       Length (cm):  2.5       Area (sq cm):  5       Width (cm):  2       Percent Debrided (%):  100       Depth (cm):  0.4       Total Area Debrided (sq cm):  5    Depth of debridement:  Subcutaneous tissue    Tissue debrided:  Dermis, Epidermis and Subcutaneous    Devitalized tissue debrided:  Biofilm and Slough    Instruments:  Curette  Bleeding:  Minimal  Hemostasis Achieved: Yes  Method Used:  Pressure    2nd Wound Details:     Location:  Left leg (calf)    Type of Debridement:  Excisional       Length (cm):  0.9       Area (sq cm):  0.72       Width (cm):  0.8       Percent Debrided (%):  100       Depth (cm):  0.4       Total Area Debrided (sq cm):  0.72    Depth of debridement:  Subcutaneous tissue    Tissue debrided:  Dermis, Epidermis and Subcutaneous    Devitalized tissue debrided:  Biofilm and Slough    Instruments:  Curette  Bleeding:  Minimal  Hemostasis Achieved: Yes    Method Used:  Pressure  Patient tolerance:  Patient tolerated the procedure well with no immediate complications  "

## 2023-07-20 ENCOUNTER — HOSPITAL ENCOUNTER (OUTPATIENT)
Dept: WOUND CARE | Facility: HOSPITAL | Age: 88
Discharge: HOME | End: 2023-07-20
Attending: EMERGENCY MEDICINE
Payer: MEDICARE

## 2023-07-20 VITALS
HEIGHT: 74 IN | BODY MASS INDEX: 26.56 KG/M2 | DIASTOLIC BLOOD PRESSURE: 73 MMHG | HEART RATE: 89 BPM | SYSTOLIC BLOOD PRESSURE: 106 MMHG | RESPIRATION RATE: 16 BRPM | WEIGHT: 207 LBS | TEMPERATURE: 98 F

## 2023-07-20 DIAGNOSIS — L97.922 CHRONIC ULCER OF LEFT LEG WITH FAT LAYER EXPOSED: ICD-10-CM

## 2023-07-20 DIAGNOSIS — I10 HYPERTENSION, UNSPECIFIED TYPE: ICD-10-CM

## 2023-07-20 DIAGNOSIS — I25.10 CORONARY ARTERIOSCLEROSIS: ICD-10-CM

## 2023-07-20 DIAGNOSIS — L97.222 CHRONIC ULCER OF LEFT CALF WITH FAT LAYER EXPOSED: ICD-10-CM

## 2023-07-20 DIAGNOSIS — Z79.01 CHRONIC ANTICOAGULATION: ICD-10-CM

## 2023-07-20 DIAGNOSIS — I70.202 ATHEROSCLEROSIS OF LEFT LEG: ICD-10-CM

## 2023-07-20 DIAGNOSIS — A49.8 PSEUDOMONAS AERUGINOSA INFECTION: ICD-10-CM

## 2023-07-20 DIAGNOSIS — I87.332 CHRONIC VENOUS HYPERTENSION WITH ULCER AND INFLAMMATION, LEFT: ICD-10-CM

## 2023-07-20 DIAGNOSIS — D64.9 ANEMIA, UNSPECIFIED TYPE: ICD-10-CM

## 2023-07-20 PROCEDURE — 11042 DBRDMT SUBQ TIS 1ST 20SQCM/<: CPT

## 2023-07-20 PROCEDURE — 11042 DEBRIDEMENT: ICD-10-PCS | Mod: ,,, | Performed by: EMERGENCY MEDICINE

## 2023-07-20 PROCEDURE — 27000999 HC MEDICAL RECORD PHOTO DOCUMENTATION

## 2023-07-20 PROCEDURE — 99499 UNLISTED E&M SERVICE: CPT | Mod: ,,, | Performed by: EMERGENCY MEDICINE

## 2023-07-20 PROCEDURE — 99499 NO LOS: ICD-10-PCS | Mod: ,,, | Performed by: EMERGENCY MEDICINE

## 2023-07-20 PROCEDURE — 11042 DBRDMT SUBQ TIS 1ST 20SQCM/<: CPT | Mod: ,,, | Performed by: EMERGENCY MEDICINE

## 2023-07-20 NOTE — PROGRESS NOTES
Subjective:       Patient ID: Donovan Enriquez is a 88 y.o. male.    Chief Complaint: Non-healing Wound Follow Up      CC: LLE ulcers      89 y/o WM referred to this Wound Care Clinic from a rehab facility. This patient has a history of hypertension, neuropathy,mixed PVD with ulcers, lymphedema, CAD, cardiomegaly, asthma on chronic  Plavix who was admitted to [facility] on 6/9/23 for shortness of breath and hypoxia.  He was ultimately diagnosed with a GI bleed and severe anemia.  He was treated with 4 blood transfusions and a GI workup with upper scope and intervention.  He was then transferred to [facility] on 6/12/23 where he stayed though  6/26/23.  It was noted upon arrival to [facility] he had multiple LLE ulcers which had been present all of 2023, maybe longer.. +wound cx for Pseudomonas so was  given cipro 500mg bid on 6/25/23.   Dr Higgins has done both angiograms and venograms in the past.   Search of Epic: note April 2022 by Dr ANKIT Higgins    Angiogram: PTA of the left tibioperoneal trunk, posterior tibial artery and anterior tibial artery.   Venogram :right-sided iliofemoral venous compression syndrome noted:  right external iliac vein, common iliac vein PTV and stenting    I debrided and advised of dressings of gentamicin ointment under moistened hydrofera blue; And referred back to Gisela. Pt declined home health and said his wife would do the dressings.have been seeing weekly since.  Getting PT in his home      Review of Systems   Constitutional: Negative.    HENT: Negative.     Respiratory: Negative.     Gastrointestinal: Negative.    Neurological: Negative.        Objective:      Vitals:    07/20/23 1333   BP: 106/73   Pulse: 89   Resp: 16   Temp: 97.8 °F (36.6 °C)     @poctglucose@  No results for input(s): POCTGLUCOSE in the last 24 hours.  Physical Exam  Vitals reviewed.   Constitutional:       Comments: Elderly ; no distress   HENT:      Head: Normocephalic and atraumatic.   Cardiovascular:       Pulses:           Dorsalis pedis pulses are 1+ on the right side and 1+ on the left side.   Pulmonary:      Effort: Pulmonary effort is normal.   Musculoskeletal:        Legs:    Skin:     Capillary Refill: Capillary refill takes less than 2 seconds.   Neurological:      General: No focal deficit present.      Mental Status: He is alert and oriented to person, place, and time. Mental status is at baseline.                  Assessment:       1. 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 Pseudomonas aeruginosa infection    4. Chronic venous hypertension with ulcer and inflammation, left    5. Atherosclerosis of left leg    6. Anemia, unspecified type    7. Chronic anticoagulation    8. Hypertension, unspecified type    9. Coronary arteriosclerosis          LLE ulcers/chronic since late 2022 per patient: first clinic visit 6/29/23  Anemia with severe GI Bleed early June 2023: treated at [facility]: multiple blood transfusions, upper GI with intervention  Mixed PVD: treated by Dr Higgins: 2022 Lower extremity angiography and venogram because of chronic LLE ulcers; h/o ulcer both legs/ankles in past   Angiogram: PTA of the left tibioperoneal trunk, posterior tibial artery and anterior tibial artery.   Venogram :right-sided iliofemoral venous compression syndrome noted:  right external iliac vein, common iliac vein PTV and stenting  Anemia with severe GI Bleed early June 2023: treated at [facility]: multiple blood transfusions, upper GI with intervention  Chronic plavix therapy  CAD  Asthma  Hypertension        Lab Results   Component Value Date    HGBA1C 5.2 09/02/2020    HGBA1C 5.3 09/02/2020     Plan:     Plan of Care:    Debrided both ulcers  Wound Care Orders: dressings of gentamicin ointment under moistened hydrofera blue; every other day; declines HH   Nutrition: Must have a high protein diet to support wound  healing; (if renal disease, see  nephrologist for amount allowed):  this should be over 100g protein /day (if no kidney issues); Also rec MVI along with vit C, vit D, zinc and Uri  Compression: not yet; don't know arterial status; has shell with Gisela soon   Return to clinic 1 week

## 2023-07-20 NOTE — PROCEDURES
"Debridement    Date/Time: 7/20/2023 1:47 PM  Performed by: Liliana Guo MD  Authorized by: Liliana Guo MD   Associated wounds:        Altered Skin Integrity 07/06/23 1416 Left anterior;lower Leg #1 Venous Ulcer       Altered Skin Integrity 07/06/23 1417 Left lower;posterior Calf #2 Venous Ulcer  Time out: Immediately prior to procedure a "time out" was called to verify the correct patient, procedure, equipment, support staff and site/side marked as required.    Consent Done?:  Yes (Written)  Local anesthesia used?: Yes    Local anesthetic:  Topical anesthetic    Wound Details:    Location:  Left leg    Type of Debridement:  Excisional       Length (cm):  2.5       Area (sq cm):  4.25       Width (cm):  1.7       Percent Debrided (%):  100       Depth (cm):  0.3       Total Area Debrided (sq cm):  4.25    Depth of debridement:  Subcutaneous tissue    Tissue debrided:  Dermis, Epidermis and Subcutaneous    Devitalized tissue debrided:  Biofilm and Slough    Instruments:  Curette  Bleeding:  Minimal  Hemostasis Achieved: Yes  Method Used:  Pressure    2nd Wound Details:     Location:  Left leg (calf)    Type of Debridement:  Excisional       Length (cm):  0.6       Area (sq cm):  0.36       Width (cm):  0.6       Percent Debrided (%):  100       Depth (cm):  0.2       Total Area Debrided (sq cm):  0.36    Depth of debridement:  Subcutaneous tissue    Tissue debrided:  Dermis, Epidermis and Subcutaneous    Devitalized tissue debrided:  Biofilm and Slough    Instruments:  Curette  Bleeding:  Minimal  Patient tolerance:  Patient tolerated the procedure well with no immediate complications  "

## 2023-07-20 NOTE — PATIENT INSTRUCTIONS
Encounter Date: 12/24/2022       History     Chief Complaint   Patient presents with    Sore Throat     Pt complaint of sore throat, body pain and intermittent headache     Patient is a 34 year old female who presents to ER with generalized body aches, headache, and sore throat x 3 days. Patient also reports subjective fever, chills, and cough. She denies chest pain or SOB. Patient reports her children were recently sick with the flu.     The history is provided by the patient. No  was used.   Sore Throat   This is a new problem. The sore throat symptoms include sore throat and fever.The current episode started two days ago. The problem has been waxing and waning. The maximum temperature recorded prior to her arrival was 100 - 100.9 F. The fever has been present for Less than 1 day. The pain is at a severity of 5/10. Associated symptoms include coughing and headaches. Pertinent negatives include no congestion, neck pain, shortness of breath, swollen glands, trouble swallowing or vomiting. She has had no exposure to strep or mono. She has tried acetaminophen for the symptoms. The treatment provided no relief.   Review of patient's allergies indicates:  No Known Allergies  History reviewed. No pertinent past medical history.  Past Surgical History:   Procedure Laterality Date    DILATION AND CURETTAGE OF UTERUS      DILATION AND CURETTAGE OF UTERUS      US ASPIRATION ABSCESS HEMATOMA SEROMA (BREAST IMAGING)       History reviewed. No pertinent family history.  Social History     Tobacco Use    Smoking status: Some Days     Types: Cigarettes    Smokeless tobacco: Never   Substance Use Topics    Alcohol use: Never    Drug use: Never     Review of Systems   Constitutional:  Negative for fever.   HENT:  Positive for sore throat. Negative for congestion, postnasal drip, rhinorrhea and trouble swallowing.    Respiratory:  Positive for cough. Negative for chest tightness and shortness of breath.   Pt seen today by: Dr. Perez    Supplies orderd on 6/29/2023 from [facility]; pt received his supplies.    Self care DRESSING INSTRUCTIONS:      Wound location: Left anterior/ Posterior Leg  Cleanse wound with wound cleanser or saline  Apply gentamycin ointment covered by NS Moistened hydra fera blue to the wound bed  Cover with exudry or ABD pad and secure with kerix and cover-all tape  Dressings to be changed Every Other Day and as needed if soiled or not intact    Moisturize dry skin on feet    Compression with: N/A    Return visit:  Thursday, July 27, 2023 at 1:30 pm.    Wound may have been debrided in clinic: if so, WHAT YOU NEED TO KNOW:  Debridement is the removal of infected, damaged, or dead tissue so a wound can heal properly. Your wound may need more than one debridement. Debridement can cause bleeding, and a small amount of blood is expected.  AFTER A DEBRIDEMENT:  Keep your wound clean and dry. Do not remove the dressing unless instructed.  Follow the wound care orders provided to you or your home health care provider.  If you have pain, take over the counter pain relievers or pain medication if prescribed.  Elevate the wound and limit excessive activity to prevent bleeding and/or swelling in your wound.  If you see blood coming through the dressing, apply gauze and tape over the dressing and hold firm pressure to the wound with your hand for 5-10 minutes continuously, without peeking, to help the bleeding stop.    Contact [facility] wound care team at [phone] or go to the nearest Emergency department if:  You have a fever greater than 101 taken by mouth.  Your pain gets worse or does not go away, even after taking your regular pain medicine.  Your skin around your wound is red, hot, swollen, or draining pus.  You have bleeding that continues to come through the dressing after holding pressure for 10 minutes     Nutrition:  The current daily value (%DV) for protein is 50 grams per day and is meant as a    Cardiovascular:  Negative for chest pain, palpitations and leg swelling.   Gastrointestinal:  Negative for nausea and vomiting.   Genitourinary:  Negative for dysuria, frequency and urgency.   Musculoskeletal:  Positive for arthralgias (Generalized). Negative for back pain and neck pain.   Skin:  Negative for rash.   Neurological:  Positive for headaches. Negative for weakness.   Hematological:  Does not bruise/bleed easily.   Psychiatric/Behavioral:  Negative for behavioral problems.    All other systems reviewed and are negative.    Physical Exam     Initial Vitals [12/24/22 1800]   BP Pulse Resp Temp SpO2   (!) 153/103 87 18 98.7 °F (37.1 °C) 100 %      MAP       --         Physical Exam    Nursing note and vitals reviewed.  Constitutional: She appears well-developed and well-nourished.   HENT:   Head: Normocephalic.   Right Ear: Hearing and tympanic membrane normal.   Left Ear: Hearing and tympanic membrane normal.   Nose: Rhinorrhea present.   Mouth/Throat: Uvula is midline, oropharynx is clear and moist and mucous membranes are normal.   Eyes: Conjunctivae are normal. Pupils are equal, round, and reactive to light.   Neck: Neck supple.   Normal range of motion.   Full passive range of motion without pain.     Cardiovascular:  Normal rate, regular rhythm, normal heart sounds and normal pulses.           Pulmonary/Chest: Effort normal and breath sounds normal.   Musculoskeletal:      Cervical back: Full passive range of motion without pain, normal range of motion and neck supple.     Lymphadenopathy:     She has no cervical adenopathy.   Neurological: She is alert. GCS eye subscore is 4. GCS verbal subscore is 5. GCS motor subscore is 6.   Skin: Skin is warm, dry and intact. Capillary refill takes less than 2 seconds.       ED Course   Procedures  Labs Reviewed   COVID/FLU A&B PCR - Normal    Narrative:     The Xpert Xpress SARS-CoV-2/FLU/RSV plus is a rapid, multiplexed real-time PCR test intended for the  general goal for most people. Further increasing your dietary protein intake is very important for wound healing. Typically one needs over 100g of protein per day to help with wound healing needs.  If you are a dialysis patient or have problems with your kidneys, talk to your Nephrologist about how much protein you can take in with your condition.  Examples of high protein items that can be added to your diet include: eggs, chicken, red meats, almonds, cottage cheese, Greek yogurt, beans, and peanut butter.  Fortified protein bars, shakes and drinks can add 15-30 additional grams of protein per serving.   Also add:   1 daily general multivitamin   Uri : 1 packet twice daily   Vitamin C : 500mg twice daily   Zinc 220 mg daily  Vit D : once daily    Offloading:  Offload your wound. This means to reduce pressure on and around the wound that reduces blood flow to the wound and prevents healing. Your wound care team will discuss specific ways for you to offload your specific wound. Common offloading strategies include:  Turn or reposition every 2 hours or sooner  Use pillows, wedges, ROHO wheelchair cushions or other special devices like boots and shoes to lift the wound off of hard surfaces  Alternating Low Air-loss (ALAL) mattress may be ordered  Padded dressings can reduce wound pressure      Call our [facility] wound clinic for questions/concerns a [phone] .   simultaneous qualitative detection and differentiation of SARS-CoV-2, Influenza A, Influenza B, and respiratory syncytial virus (RSV) viral RNA in either nasopharyngeal swab or nasal swab specimens.         STREP GROUP A BY PCR - Normal    Narrative:     The Xpert Xpress Strep A test is a rapid, qualitative in vitro diagnostic test for the detection of Streptococcus pyogenes (Group A ß-hemolytic Streptococcus, Strep A) in throat swab specimens from patients with signs and symptoms of pharyngitis.            Imaging Results    None          Medications   ketorolac tablet 10 mg (10 mg Oral Given 12/24/22 1903)     Medical Decision Making:   Initial Assessment:   Awake and alert, NAD.  Differential Diagnosis:   Viral illness, covid, flu, strep  Clinical Tests:   Lab Tests: Ordered and Reviewed  ED Management:  Patient is a 34 year old female who presents to ER with generalized body aches, chills, cough, and intermittent headache x 3 days. She reports her children were sick two weeks ago with flu. She denies chest pain or sob. She tested negative for covid, flu, strep today. She is afebrile, well appearing. Encouraged patient to increase fluid intake, use allergy medication daily. She is amendable and ready for discharge home. Patient is requesting pain medication for her body aches and work excuse for work tomorrow. Will send script for diclofenac.                         Clinical Impression:   Final diagnoses:  [B34.9] Viral syndrome (Primary)        ED Disposition Condition    Discharge Stable          ED Prescriptions       Medication Sig Dispense Start Date End Date Auth. Provider    diclofenac (VOLTAREN) 50 MG EC tablet Take 1 tablet (50 mg total) by mouth 2 (two) times daily as needed (Pain). 14 tablet 12/24/2022 12/31/2022 Ghazal Camacho NP          Follow-up Information       Follow up With Specialties Details Why Contact Info    Primary Care Provider  Schedule an appointment as soon as possible for a visit   As needed, If symptoms worsen              Ghazal Camacho, DIANE  12/24/22 1921

## 2023-07-27 ENCOUNTER — HOSPITAL ENCOUNTER (OUTPATIENT)
Dept: WOUND CARE | Facility: HOSPITAL | Age: 88
Discharge: HOME | End: 2023-07-27
Attending: EMERGENCY MEDICINE
Payer: MEDICARE

## 2023-07-27 VITALS
SYSTOLIC BLOOD PRESSURE: 107 MMHG | BODY MASS INDEX: 26.56 KG/M2 | RESPIRATION RATE: 16 BRPM | HEART RATE: 80 BPM | DIASTOLIC BLOOD PRESSURE: 56 MMHG | WEIGHT: 207 LBS | HEIGHT: 74 IN | TEMPERATURE: 97 F

## 2023-07-27 DIAGNOSIS — L97.922 CHRONIC ULCER OF LEFT LEG WITH FAT LAYER EXPOSED: Primary | ICD-10-CM

## 2023-07-27 DIAGNOSIS — I87.332 CHRONIC VENOUS HYPERTENSION WITH ULCER AND INFLAMMATION, LEFT: ICD-10-CM

## 2023-07-27 DIAGNOSIS — D64.9 ANEMIA, UNSPECIFIED TYPE: ICD-10-CM

## 2023-07-27 DIAGNOSIS — I70.202 ATHEROSCLEROSIS OF LEFT LEG: ICD-10-CM

## 2023-07-27 DIAGNOSIS — L97.222 CHRONIC ULCER OF LEFT CALF WITH FAT LAYER EXPOSED: ICD-10-CM

## 2023-07-27 DIAGNOSIS — A49.8 PSEUDOMONAS AERUGINOSA INFECTION: ICD-10-CM

## 2023-07-27 DIAGNOSIS — I10 HYPERTENSION, UNSPECIFIED TYPE: ICD-10-CM

## 2023-07-27 PROCEDURE — 99499 NO LOS: ICD-10-PCS | Mod: ,,, | Performed by: EMERGENCY MEDICINE

## 2023-07-27 PROCEDURE — 27000999 HC MEDICAL RECORD PHOTO DOCUMENTATION

## 2023-07-27 PROCEDURE — 11042 DBRDMT SUBQ TIS 1ST 20SQCM/<: CPT | Mod: ,,, | Performed by: EMERGENCY MEDICINE

## 2023-07-27 PROCEDURE — 11042 DEBRIDEMENT: ICD-10-PCS | Mod: ,,, | Performed by: EMERGENCY MEDICINE

## 2023-07-27 PROCEDURE — 11042 DBRDMT SUBQ TIS 1ST 20SQCM/<: CPT

## 2023-07-27 PROCEDURE — 99499 UNLISTED E&M SERVICE: CPT | Mod: ,,, | Performed by: EMERGENCY MEDICINE

## 2023-07-27 NOTE — PROCEDURES
"Debridement    Date/Time: 7/27/2023 2:08 PM  Performed by: Liliana Guo MD  Authorized by: Liliana Guo MD   Associated wounds:        Altered Skin Integrity 07/06/23 1416 Left anterior;lower Leg #1 Venous Ulcer       Altered Skin Integrity 07/06/23 1417 Left lower;posterior Calf #2 Venous Ulcer  Time out: Immediately prior to procedure a "time out" was called to verify the correct patient, procedure, equipment, support staff and site/side marked as required.    Consent Done?:  Yes (Written)  Local anesthesia used?: Yes    Local anesthetic:  Topical anesthetic    Wound Details:    Location:  Left leg    Type of Debridement:  Excisional       Length (cm):  2.3       Area (sq cm):  3.91       Width (cm):  1.7       Percent Debrided (%):  100       Depth (cm):  0.3       Total Area Debrided (sq cm):  3.91    Depth of debridement:  Subcutaneous tissue    Tissue debrided:  Dermis, Epidermis and Subcutaneous    Devitalized tissue debrided:  Biofilm and Slough    Instruments:  Curette  Bleeding:  Minimal  Hemostasis Achieved: Yes  Method Used:  Pressure    2nd Wound Details:     Debridement - 2nd Wound - General Location: left calf.    Type of Debridement:  Excisional       Length (cm):  0.6       Area (sq cm):  0.24       Width (cm):  0.4       Percent Debrided (%):  100       Depth (cm):  0.2       Total Area Debrided (sq cm):  0.24    Depth of debridement:  Subcutaneous tissue    Tissue debrided:  Dermis, Epidermis and Subcutaneous    Devitalized tissue debrided:  Biofilm and Slough    Instruments:  Curette  Bleeding:  Minimal  Hemostasis Achieved: Yes    Method Used:  Pressure  Patient tolerance:  Patient tolerated the procedure well with no immediate complications  "

## 2023-07-27 NOTE — PATIENT INSTRUCTIONS
Pt seen today by: Dr. Perez    Supplies orderd on 6/29/2023 from [facility]"; pt received his supplies.    Self care DRESSING INSTRUCTIONS:      Wound location: Left anterior/ Posterior Leg  Cleanse wound with wound cleanser or saline  Apply gentamycin ointment covered by NS Moistened hydra fera blue to the wound bed  Cover with exudry or ABD pad and secure with kerix and cover-all tape  Dressings to be changed Every Other Day and as needed if soiled or not intact    Moisturize dry skin on feet    Compression with: N/A    Return visit:  Thursday, August 3, 2023 at 1:30 pm.    Wound may have been debrided in clinic: if so, WHAT YOU NEED TO KNOW:  Debridement is the removal of infected, damaged, or dead tissue so a wound can heal properly. Your wound may need more than one debridement. Debridement can cause bleeding, and a small amount of blood is expected.  AFTER A DEBRIDEMENT:  Keep your wound clean and dry. Do not remove the dressing unless instructed.  Follow the wound care orders provided to you or your home health care provider.  If you have pain, take over the counter pain relievers or pain medication if prescribed.  Elevate the wound and limit excessive activity to prevent bleeding and/or swelling in your wound.  If you see blood coming through the dressing, apply gauze and tape over the dressing and hold firm pressure to the wound with your hand for 5-10 minutes continuously, without peeking, to help the bleeding stop.    Contact [facility] wound care team at [phone] or go to the nearest Emergency department if:  You have a fever greater than 101 taken by mouth.  Your pain gets worse or does not go away, even after taking your regular pain medicine.  Your skin around your wound is red, hot, swollen, or draining pus.  You have bleeding that continues to come through the dressing after holding pressure for 10 minutes     Nutrition:  The current daily value (%DV) for protein is 50 grams per day and is meant as a  general goal for most people. Further increasing your dietary protein intake is very important for wound healing. Typically one needs over 100g of protein per day to help with wound healing needs.  If you are a dialysis patient or have problems with your kidneys, talk to your Nephrologist about how much protein you can take in with your condition.  Examples of high protein items that can be added to your diet include: eggs, chicken, red meats, almonds, cottage cheese, Greek yogurt, beans, and peanut butter.  Fortified protein bars, shakes and drinks can add 15-30 additional grams of protein per serving.   Also add:   1 daily general multivitamin   Uri : 1 packet twice daily   Vitamin C : 500mg twice daily   Zinc 220 mg daily  Vit D : once daily    Offloading:  Offload your wound. This means to reduce pressure on and around the wound that reduces blood flow to the wound and prevents healing. Your wound care team will discuss specific ways for you to offload your specific wound. Common offloading strategies include:  Turn or reposition every 2 hours or sooner  Use pillows, wedges, ROHO wheelchair cushions or other special devices like boots and shoes to lift the wound off of hard surfaces  Alternating Low Air-loss (ALAL) mattress may be ordered  Padded dressings can reduce wound pressure      Call our [facility] wound clinic for questions/concerns a [phone] .

## 2023-07-27 NOTE — PROGRESS NOTES
Subjective:       Patient ID: Donovan Enriquez is a 88 y.o. male.    Chief Complaint: Non-healing Wound Follow Up      CC: LLE ulcers      89 y/o WM referred to this Wound Care Clinic from a rehab facility. This patient has a history of hypertension, neuropathy,mixed PVD with ulcers, lymphedema, CAD, cardiomegaly, asthma on chronic  Plavix who was admitted to [facility] on 6/9/23 for shortness of breath and hypoxia.  He was ultimately diagnosed with a GI bleed and severe anemia.  He was treated with 4 blood transfusions and a GI workup with upper scope and intervention.  He was then transferred to [facility]/ [facility] on 6/12/23 where he stayed though  6/26/23.  It was noted upon arrival to [facility] he had multiple LLE ulcers which had been present all of 2023, maybe longer.. +wound cx for Pseudomonas so was  given cipro 500mg bid on 6/25/23.   Dr Higgins has done both angiograms and venograms in the past.   Search of Epic: note April 2022 by Dr ANKIT Higgins    Angiogram: PTA of the left tibioperoneal trunk, posterior tibial artery and anterior tibial artery.   Venogram :right-sided iliofemoral venous compression syndrome noted:  right external iliac vein, common iliac vein PTV and stenting    I debrided and advised of dressings of gentamicin ointment under moistened hydrofera blue; And referred back to Gisela. Pt declined home health and said his wife would do the dressings  I have been seeing weekly since then and ulcers are better although its' slow. Having vascular eval of some sort soon      Review of Systems   Constitutional: Negative.    HENT: Negative.     Respiratory: Negative.     Gastrointestinal: Negative.    Neurological: Negative.        Objective:      Vitals:    07/27/23 1341   BP: (!) 107/56   Pulse: 80   Resp: 16   Temp: 97.4 °F (36.3 °C)     @poctglucose@  No results for input(s): POCTGLUCOSE in the last 24 hours.  Physical Exam  Vitals reviewed.   Constitutional:       Comments: Elderly ; no distress    HENT:      Head: Normocephalic and atraumatic.   Cardiovascular:      Pulses:           Dorsalis pedis pulses are 1+ on the right side and 1+ on the left side.   Pulmonary:      Effort: Pulmonary effort is normal.   Musculoskeletal:        Legs:    Skin:     Capillary Refill: Capillary refill takes less than 2 seconds.   Neurological:      General: No focal deficit present.      Mental Status: He is alert and oriented to person, place, and time. Mental status is at baseline.            Altered Skin Integrity 07/06/23 1416 Left anterior;lower Leg #1 Venous Ulcer (Active)   07/06/23 1416   Altered Skin Integrity Present on Admission - Did Patient arrive to the hospital with altered skin?: yes   Side: Left   Orientation: anterior;lower   Location: Leg   Wound Number: #1   Is this injury device related?: No   Primary Wound Type: Venous ulcer   Description of Altered Skin Integrity:    Ankle-Brachial Index: Done 7/27/23 R DP: 0.98 PT: 1.04/ L DP: 0.89  PT:  0.85   Pulses: Palpable X2; + Doppler- biphasic   Removal Indication and Assessment:    Wound Outcome:    (Retired) Wound Length (cm):    (Retired) Wound Width (cm):    (Retired) Depth (cm):    Wound Description (Comments):    Removal Indications:    Wound Image   07/27/23 1353   Dressing Appearance Intact;Moist drainage 07/27/23 1353   Drainage Amount Moderate 07/27/23 1353   Drainage Characteristics/Odor Serosanguineous;No odor 07/27/23 1353   Appearance Yellow 07/27/23 1353   Tissue loss description Full thickness 07/27/23 1353   Black (%), Wound Tissue Color 0 % 07/27/23 1353   Red (%), Wound Tissue Color 0 % 07/27/23 1353   Yellow (%), Wound Tissue Color 100 % 07/27/23 1353   Periwound Area Intact;Dry 07/27/23 1353   Wound Edges Defined 07/27/23 1353   Wound Length (cm) 2.3 cm 07/27/23 1353   Wound Width (cm) 1.7 cm 07/27/23 1353   Wound Depth (cm) 0.3 cm 07/27/23 1353   Wound Volume (cm^3) 1.173 cm^3 07/27/23 1353   Wound Surface Area (cm^2) 3.91 cm^2 07/27/23  1353   Care Cleansed with:;Wound cleanser;Applied: 07/27/23 1353   Dressing Applied 07/27/23 1353            Altered Skin Integrity 07/06/23 1417 Left lower;posterior Calf #2 Venous Ulcer (Active)   07/06/23 1417   Altered Skin Integrity Present on Admission - Did Patient arrive to the hospital with altered skin?: yes   Side: Left   Orientation: lower;posterior   Location: Calf   Wound Number: #2   Is this injury device related?: No   Primary Wound Type: Venous ulcer   Description of Altered Skin Integrity:    Ankle-Brachial Index: Done 6/29/23 R DP: 0.98 PT: 1.04/ L DP: 0.89  PT:  0.85   Pulses: palpable x2; + Doppler- biphasic x4   Removal Indication and Assessment:    Wound Outcome:    (Retired) Wound Length (cm):    (Retired) Wound Width (cm):    (Retired) Depth (cm):    Wound Description (Comments):    Removal Indications:    Wound Image   07/27/23 1353   Dressing Appearance Intact;Moist drainage 07/27/23 1353   Drainage Amount Moderate 07/27/23 1353   Drainage Characteristics/Odor Serosanguineous;No odor 07/27/23 1353   Appearance Yellow 07/27/23 1353   Tissue loss description Partial thickness 07/27/23 1353   Black (%), Wound Tissue Color 0 % 07/27/23 1353   Red (%), Wound Tissue Color 0 % 07/27/23 1353   Yellow (%), Wound Tissue Color 100 % 07/27/23 1353   Periwound Area Intact;Dry 07/27/23 1353   Wound Edges Defined 07/27/23 1353   Wound Length (cm) 0.6 cm 07/27/23 1353   Wound Width (cm) 0.4 cm 07/27/23 1353   Wound Depth (cm) 0.2 cm 07/27/23 1353   Wound Volume (cm^3) 0.048 cm^3 07/27/23 1353   Wound Surface Area (cm^2) 0.24 cm^2 07/27/23 1353   Care Cleansed with:;Wound cleanser;Applied: 07/27/23 1353   Dressing Applied 07/27/23 1353             Assessment:       1. 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 Pseudomonas aeruginosa infection    4. Chronic venous hypertension with ulcer and inflammation, left    5. Atherosclerosis of left  leg    6. Anemia, unspecified type    7. Hypertension, unspecified type          LLE ulcers/chronic since late 2022 per patient: first clinic visit 6/29/23  Anemia with severe GI Bleed early June 2023: treated at [facility]: multiple blood transfusions, upper GI with intervention  Mixed PVD: treated by Dr Higgins: 2022 Lower extremity angiography and venogram because of chronic LLE ulcers; h/o ulcer both legs/ankles in past   Angiogram: PTA of the left tibioperoneal trunk, posterior tibial artery and anterior tibial artery.   Venogram :right-sided iliofemoral venous compression syndrome noted:  right external iliac vein, common iliac vein PTV and stenting  Anemia with severe GI Bleed early June 2023: treated at [facility]: multiple blood transfusions, upper GI with intervention  Chronic plavix therapy  CAD  Asthma  Hypertension        Lab Results   Component Value Date    HGBA1C 5.2 09/02/2020    HGBA1C 5.3 09/02/2020     Plan:     Plan of Care:    Debrided both ulcers  Wound Care Orders: dressings of gentamicin ointment under moistened hydrofera blue; every other day; declines HH   Nutrition: Must have a high protein diet to support wound  healing; (if renal disease, see nephrologist for amount allowed):  this should be over 100g protein /day (if no kidney issues); Also rec MVI along with vit C, vit D, zinc and Uri  Compression: not yet; don't know arterial status; having vascular eval with Gisela soon   Return to clinic 1 week

## 2023-08-03 ENCOUNTER — HOSPITAL ENCOUNTER (OUTPATIENT)
Dept: WOUND CARE | Facility: HOSPITAL | Age: 88
Discharge: HOME | End: 2023-08-03
Attending: EMERGENCY MEDICINE
Payer: MEDICARE

## 2023-08-03 VITALS
WEIGHT: 207 LBS | RESPIRATION RATE: 18 BRPM | DIASTOLIC BLOOD PRESSURE: 63 MMHG | TEMPERATURE: 98 F | BODY MASS INDEX: 26.56 KG/M2 | HEART RATE: 70 BPM | SYSTOLIC BLOOD PRESSURE: 111 MMHG | HEIGHT: 74 IN

## 2023-08-03 DIAGNOSIS — S41.112A SKIN TEAR OF LEFT UPPER ARM WITHOUT COMPLICATION, INITIAL ENCOUNTER: ICD-10-CM

## 2023-08-03 DIAGNOSIS — D64.9 ANEMIA, UNSPECIFIED TYPE: ICD-10-CM

## 2023-08-03 DIAGNOSIS — I87.332 CHRONIC VENOUS HYPERTENSION WITH ULCER AND INFLAMMATION, LEFT: ICD-10-CM

## 2023-08-03 DIAGNOSIS — I25.10 CORONARY ARTERIOSCLEROSIS: ICD-10-CM

## 2023-08-03 DIAGNOSIS — L97.922 CHRONIC ULCER OF LEFT LEG WITH FAT LAYER EXPOSED: ICD-10-CM

## 2023-08-03 DIAGNOSIS — L97.911 ULCER OF RIGHT LOWER EXTREMITY, LIMITED TO BREAKDOWN OF SKIN: ICD-10-CM

## 2023-08-03 DIAGNOSIS — L97.222 CHRONIC ULCER OF LEFT CALF WITH FAT LAYER EXPOSED: ICD-10-CM

## 2023-08-03 DIAGNOSIS — I10 HYPERTENSION, UNSPECIFIED TYPE: ICD-10-CM

## 2023-08-03 DIAGNOSIS — A49.8 PSEUDOMONAS AERUGINOSA INFECTION: ICD-10-CM

## 2023-08-03 DIAGNOSIS — I70.202 ATHEROSCLEROSIS OF LEFT LEG: ICD-10-CM

## 2023-08-03 PROCEDURE — 11042 DBRDMT SUBQ TIS 1ST 20SQCM/<: CPT | Mod: ,,, | Performed by: EMERGENCY MEDICINE

## 2023-08-03 PROCEDURE — 99212 OFFICE O/P EST SF 10 MIN: CPT | Mod: 25,,, | Performed by: EMERGENCY MEDICINE

## 2023-08-03 PROCEDURE — 11042 DEBRIDEMENT: ICD-10-PCS | Mod: ,,, | Performed by: EMERGENCY MEDICINE

## 2023-08-03 PROCEDURE — 11042 DBRDMT SUBQ TIS 1ST 20SQCM/<: CPT

## 2023-08-03 PROCEDURE — 27000999 HC MEDICAL RECORD PHOTO DOCUMENTATION

## 2023-08-03 PROCEDURE — 99212 PR OFFICE/OUTPT VISIT, EST, LEVL II, 10-19 MIN: ICD-10-PCS | Mod: 25,,, | Performed by: EMERGENCY MEDICINE

## 2023-08-03 NOTE — PROGRESS NOTES
Subjective:       Patient ID: Donovan Enriquez is a 88 y.o. male.    Chief Complaint: No chief complaint on file.      CC: LLE ulcers      89 y/o WM referred to this Wound Care Clinic from a rehab facility. This patient has a history of hypertension, neuropathy,mixed PVD with ulcers, lymphedema, CAD, cardiomegaly, asthma on chronic  Plavix who was admitted to [facility] on 6/9/23 for shortness of breath and hypoxia.  He was ultimately diagnosed with a GI bleed and severe anemia.  He was treated with 4 blood transfusions and a GI workup with upper scope and intervention.  He was then transferred to [facility] [place] on 6/12/23 where he stayed though  6/26/23.  It was noted upon arrival to [facility] [place] he had multiple LLE ulcers which had been present all of 2023, maybe longer.. +wound cx for Pseudomonas so was  given cipro 500mg bid on 6/25/23.   Dr Higgins has done both angiograms and venograms in the past.   Search of Epic: note April 2022 by Dr ANKIT Higgins    Angiogram: PTA of the left tibioperoneal trunk, posterior tibial artery and anterior tibial artery.   Venogram :right-sided iliofemoral venous compression syndrome noted:  right external iliac vein, common iliac vein PTV and stenting    I debrided and advised of dressings of gentamicin ointment under moistened hydrofera blue; And referred back to Gisela. Pt declined home health and said his wife would do the dressings  I have been seeing weekly since then and ulcers are better although its' slow. Having vascular eval of some sort soon  Comes in today on 8/3/23 with new skin tear LUE and a new superficial ulcer on RLE: he hit his arm on car door on way here but not sure about the RLE        Review of Systems   Constitutional: Negative.    HENT: Negative.     Respiratory: Negative.     Gastrointestinal: Negative.    Neurological: Negative.          Objective:      Vitals:    08/03/23 1336   BP: 111/63   Pulse: 70   Resp: 18   Temp: 97.6 °F (36.4 °C)  "    @poctglucose@  No results for input(s): "POCTGLUCOSE" in the last 24 hours.  Physical Exam  Vitals reviewed.   Constitutional:       Comments: Elderly ; no distress   HENT:      Head: Normocephalic and atraumatic.   Cardiovascular:      Pulses:           Dorsalis pedis pulses are 1+ on the right side and 1+ on the left side.   Pulmonary:      Effort: Pulmonary effort is normal.   Musculoskeletal:        Arms:         Legs:    Skin:     Capillary Refill: Capillary refill takes less than 2 seconds.   Neurological:      General: No focal deficit present.      Mental Status: He is alert and oriented to person, place, and time. Mental status is at baseline.              Altered Skin Integrity 07/06/23 1416 Left anterior;lower Leg #1 Venous Ulcer (Active)   07/06/23 1416   Altered Skin Integrity Present on Admission - Did Patient arrive to the hospital with altered skin?: yes   Side: Left   Orientation: anterior;lower   Location: Leg   Wound Number: #1   Is this injury device related?: No   Primary Wound Type: Venous ulcer   Description of Altered Skin Integrity:    Ankle-Brachial Index: Done 7/27/23 R DP: 0.98 PT: 1.04/ L DP: 0.89  PT:  0.85   Pulses: Palpable X2; + Doppler- biphasic   Removal Indication and Assessment:    Wound Outcome:    (Retired) Wound Length (cm):    (Retired) Wound Width (cm):    (Retired) Depth (cm):    Wound Description (Comments):    Removal Indications:    Wound Image   08/03/23 1345   Description of Altered Skin Integrity Full thickness tissue loss. Subcutaneous fat may be visible but bone, tendon or muscle are not exposed 08/03/23 1345   Dressing Appearance Intact 08/03/23 1345   Drainage Amount Moderate 08/03/23 1345   Drainage Characteristics/Odor Yellow 08/03/23 1345   Appearance Pink;Yellow 08/03/23 1345   Tissue loss description Full thickness 08/03/23 1345   Black (%), Wound Tissue Color 0 % 08/03/23 1345   Red (%), Wound Tissue Color 90 % 08/03/23 1345   Yellow (%), Wound Tissue " Color 10 % 08/03/23 1345   Periwound Area Intact 08/03/23 1345   Wound Edges Defined 08/03/23 1345   Wound Length (cm) 2 cm 08/03/23 1345   Wound Width (cm) 1.5 cm 08/03/23 1345   Wound Depth (cm) 0.3 cm 08/03/23 1345   Wound Volume (cm^3) 0.9 cm^3 08/03/23 1345   Wound Surface Area (cm^2) 3 cm^2 08/03/23 1345   Care Cleansed with:;Soap and water;Wound cleanser;Applied: 08/03/23 1345            Altered Skin Integrity 07/06/23 1417 Left lower;posterior Calf #2 Venous Ulcer (Active)   07/06/23 1417   Altered Skin Integrity Present on Admission - Did Patient arrive to the hospital with altered skin?: yes   Side: Left   Orientation: lower;posterior   Location: Calf   Wound Number: #2   Is this injury device related?: No   Primary Wound Type: Venous ulcer   Description of Altered Skin Integrity:    Ankle-Brachial Index: Done 6/29/23 R DP: 0.98 PT: 1.04/ L DP: 0.89  PT:  0.85   Pulses: palpable x2; + Doppler- biphasic x4   Removal Indication and Assessment:    Wound Outcome:    (Retired) Wound Length (cm):    (Retired) Wound Width (cm):    (Retired) Depth (cm):    Wound Description (Comments):    Removal Indications:    Wound Image   08/03/23 1348   Description of Altered Skin Integrity Full thickness tissue loss. Subcutaneous fat may be visible but bone, tendon or muscle are not exposed 08/03/23 1348   Dressing Appearance Intact 08/03/23 1348   Drainage Amount Moderate 08/03/23 1348   Drainage Characteristics/Odor Yellow 08/03/23 1348   Appearance Pink;Yellow 08/03/23 1348   Tissue loss description Full thickness 08/03/23 1348   Black (%), Wound Tissue Color 0 % 08/03/23 1348   Red (%), Wound Tissue Color 20 % 08/03/23 1348   Yellow (%), Wound Tissue Color 80 % 08/03/23 1348   Periwound Area Intact 08/03/23 1348   Wound Edges Defined 08/03/23 1348   Wound Length (cm) 0.8 cm 08/03/23 1348   Wound Width (cm) 0.5 cm 08/03/23 1348   Wound Depth (cm) 0.4 cm 08/03/23 1348   Wound Volume (cm^3) 0.16 cm^3 08/03/23 1348   Wound  Surface Area (cm^2) 0.4 cm^2 08/03/23 1348   Care Cleansed with:;Soap and water;Wound cleanser;Applied: 08/03/23 1348            Altered Skin Integrity 08/03/23 1350 Left medial Arm #3 Skin Tear (Active)   08/03/23 1350   Altered Skin Integrity Present on Admission - Did Patient arrive to the hospital with altered skin?: yes   Side: Left   Orientation: medial   Location: Arm   Wound Number: #3   Is this injury device related?: Yes   Primary Wound Type: Skin tear   Description of Altered Skin Integrity:    Ankle-Brachial Index:    Pulses:    Removal Indication and Assessment:    Wound Outcome:    (Retired) Wound Length (cm):    (Retired) Wound Width (cm):    (Retired) Depth (cm):    Wound Description (Comments):    Removal Indications:    Wound Image   08/03/23 1351   Description of Altered Skin Integrity Partial thickness tissue loss. Shallow open ulcer with a red or pink wound bed, without slough. Intact or Open/Ruptured Serum-filled blister. 08/03/23 1351   Dressing Appearance Intact 08/03/23 1351   Drainage Amount Moderate 08/03/23 1351   Drainage Characteristics/Odor Bleeding controlled 08/03/23 1351   Appearance Red;Epithelialization 08/03/23 1351   Tissue loss description Partial thickness 08/03/23 1351   Black (%), Wound Tissue Color 0 % 08/03/23 1351   Red (%), Wound Tissue Color 100 % 08/03/23 1351   Yellow (%), Wound Tissue Color 0 % 08/03/23 1351   Periwound Area Intact 08/03/23 1351   Wound Edges Jagged 08/03/23 1351   Wound Length (cm) 1.4 cm 08/03/23 1351   Wound Width (cm) 0.1 cm 08/03/23 1351   Wound Depth (cm) 0.1 cm 08/03/23 1351   Wound Volume (cm^3) 0.014 cm^3 08/03/23 1351   Wound Surface Area (cm^2) 0.14 cm^2 08/03/23 1351   Care Cleansed with:;Wound cleanser;Applied: 08/03/23 1351            Altered Skin Integrity 08/03/23 1352 Right lower Leg #4 Venous Ulcer (Active)   08/03/23 1352   Altered Skin Integrity Present on Admission - Did Patient arrive to the hospital with altered skin?: yes    Side: Right   Orientation: lower   Location: Leg   Wound Number: #4   Is this injury device related?: No   Primary Wound Type: Venous ulcer   Description of Altered Skin Integrity:    Ankle-Brachial Index:    Pulses:    Removal Indication and Assessment:    Wound Outcome:    (Retired) Wound Length (cm):    (Retired) Wound Width (cm):    (Retired) Depth (cm):    Wound Description (Comments):    Removal Indications:    Wound Image   08/03/23 1352   Description of Altered Skin Integrity Partial thickness tissue loss. Shallow open ulcer with a red or pink wound bed, without slough. Intact or Open/Ruptured Serum-filled blister. 08/03/23 1352   Dressing Appearance Intact;Moist drainage 08/03/23 1352   Drainage Amount Moderate 08/03/23 1352   Drainage Characteristics/Odor Yellow 08/03/23 1352   Appearance Pink 08/03/23 1352   Tissue loss description Partial thickness 08/03/23 1352   Black (%), Wound Tissue Color 0 % 08/03/23 1352   Red (%), Wound Tissue Color 100 % 08/03/23 1352   Yellow (%), Wound Tissue Color 0 % 08/03/23 1352   Periwound Area Intact 08/03/23 1352   Wound Edges Defined 08/03/23 1352   Wound Length (cm) 0.7 cm 08/03/23 1352   Wound Width (cm) 0.5 cm 08/03/23 1352   Wound Depth (cm) 0.1 cm 08/03/23 1352   Wound Volume (cm^3) 0.035 cm^3 08/03/23 1352   Wound Surface Area (cm^2) 0.35 cm^2 08/03/23 1352   Care Cleansed with:;Soap and water;Wound cleanser;Applied: 08/03/23 1352             Assessment:       1. 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 Pseudomonas aeruginosa infection    4. Chronic venous hypertension with ulcer and inflammation, left    5. Atherosclerosis of left leg    6. Anemia, unspecified type    7. Hypertension, unspecified type    8. Coronary arteriosclerosis    9. Skin tear of left upper arm without complication, initial encounter    10. Ulcer of right lower extremity, limited to breakdown of skin          LLE  ulcers/chronic since late 2022 per patient: first clinic visit 6/29/23  Anemia with severe GI Bleed early June 2023: treated at [facility]: multiple blood transfusions, upper GI with intervention  Mixed PVD: treated by Dr Higgins: 2022 Lower extremity angiography and venogram because of chronic LLE ulcers; h/o ulcer both legs/ankles in past   Angiogram: PTA of the left tibioperoneal trunk, posterior tibial artery and anterior tibial artery.   Venogram :right-sided iliofemoral venous compression syndrome noted:  right external iliac vein, common iliac vein PTV and stenting  Anemia with severe GI Bleed early June 2023: treated at [facility]: multiple blood transfusions, upper GI with intervention  Chronic plavix therapy  CAD  Asthma  Hypertension        Lab Results   Component Value Date    HGBA1C 5.2 09/02/2020    HGBA1C 5.3 09/02/2020     Plan:     Plan of Care:    Debrided both LLE ulcer  New skin tear: bruised epidermal flap on LUE already layed back down : not sure if it will be viable but I placed a steristrip to keep it tacked down and we will see how it evolves  RLE new superficial ulcer: just keep covered  Wound Care Orders: dressings of gentamicin ointment under moistened hydrofera blue; every other day; declines HH   Nutrition: Must have a high protein diet to support wound  healing; (if renal disease, see nephrologist for amount allowed):  this should be over 100g protein /day (if no kidney issues); Also rec MVI along with vit C, vit D, zinc and Uri  Compression: not yet; don't know arterial status; having vascular eval with Gisela soon   Return to clinic 1 week       The time spent including preparing to see the patient, obtaining patient history and assessment, evaluation of the plan of care, patient/caregiver counseling and education, orders, documentation, coordination of care, and other professional medical management activities for today's encounter was 18 minutes.: new issues  Time spent performing  procedures during today's encounter was 6 minutes.

## 2023-08-03 NOTE — PROCEDURES
Debridement    Date/Time: 8/3/2023 1:29 PM    Performed by: Liliana Guo MD  Authorized by: Liliana Guo MD  Associated wounds:        Altered Skin Integrity 07/06/23 1416 Left anterior;lower Leg #1 Venous Ulcer       Altered Skin Integrity 07/06/23 1417 Left lower;posterior Calf #2 Venous Ulcer  Local anesthetic:  Topical anesthetic    Wound Details:    Location:  Left leg    Type of Debridement:  Excisional       Length (cm):  2       Area (sq cm):  3       Width (cm):  1.5       Percent Debrided (%):  100       Depth (cm):  0.3       Total Area Debrided (sq cm):  3    Depth of debridement:  Subcutaneous tissue    Tissue debrided:  Dermis, Epidermis and Subcutaneous    Devitalized tissue debrided:  Biofilm and Slough    Instruments:  Curette  Bleeding:  Minimal  Hemostasis Achieved: Yes  Method Used:  Pressure    2nd Wound Details:     Location:  Left leg (calf)    Type of Debridement:  Excisional       Length (cm):  0.8       Area (sq cm):  0.4       Width (cm):  0.5       Percent Debrided (%):  100       Depth (cm):  0.4       Total Area Debrided (sq cm):  0.4    Depth of debridement:  Subcutaneous tissue    Tissue debrided:  Dermis, Epidermis and Subcutaneous    Devitalized tissue debrided:  Biofilm and Slough    Instruments:  Curette  Bleeding:  Minimal  Hemostasis Achieved: Yes    Method Used:  Pressure  Patient tolerance:  Patient tolerated the procedure well with no immediate complications

## 2023-08-03 NOTE — PATIENT INSTRUCTIONS
Pt seen today by: Dr. Perez    Supplies orderd on 6/29/2023 from [facility]; pt received his supplies.    Self care DRESSING INSTRUCTIONS:      Wound location: Left anterior/ Posterior Leg  Cleanse wound with wound cleanser or saline  Apply gentamycin ointment covered by NS Moistened hydra fera blue to the wound bed  Cover with exudry or ABD pad and secure with kerix and cover-all tape  Dressings to be changed Every Other Day and as needed if soiled or not intact    Wound location: Rt leg  Cleanse wound with wound cleanser or Normal saline  Apply gentamycin under a bandaid  Dressing to be changed every other day    Wound location: Left medial arm (New skin tear)  Change bordered foam dressing every 2-3 days            Moisturize dry skin on feet    Compression with: N/A    Return visit:  Friday, August 11, 2023 at 11:00    Wound may have been debrided in clinic: if so, WHAT YOU NEED TO KNOW:  Debridement is the removal of infected, damaged, or dead tissue so a wound can heal properly. Your wound may need more than one debridement. Debridement can cause bleeding, and a small amount of blood is expected.  AFTER A DEBRIDEMENT:  Keep your wound clean and dry. Do not remove the dressing unless instructed.  Follow the wound care orders provided to you or your home health care provider.  If you have pain, take over the counter pain relievers or pain medication if prescribed.  Elevate the wound and limit excessive activity to prevent bleeding and/or swelling in your wound.  If you see blood coming through the dressing, apply gauze and tape over the dressing and hold firm pressure to the wound with your hand for 5-10 minutes continuously, without peeking, to help the bleeding stop.    Contact [facility] wound care team at [phone] or go to the nearest Emergency department if:  You have a fever greater than 101 taken by mouth.  Your pain gets worse or does not go away, even after taking your regular pain medicine.  Your skin  around your wound is red, hot, swollen, or draining pus.  You have bleeding that continues to come through the dressing after holding pressure for 10 minutes     Nutrition:  The current daily value (%DV) for protein is 50 grams per day and is meant as a general goal for most people. Further increasing your dietary protein intake is very important for wound healing. Typically one needs over 100g of protein per day to help with wound healing needs.  If you are a dialysis patient or have problems with your kidneys, talk to your Nephrologist about how much protein you can take in with your condition.  Examples of high protein items that can be added to your diet include: eggs, chicken, red meats, almonds, cottage cheese, Greek yogurt, beans, and peanut butter.  Fortified protein bars, shakes and drinks can add 15-30 additional grams of protein per serving.   Also add:   1 daily general multivitamin   Uri : 1 packet twice daily   Vitamin C : 500mg twice daily   Zinc 220 mg daily  Vit D : once daily    Offloading:  Offload your wound. This means to reduce pressure on and around the wound that reduces blood flow to the wound and prevents healing. Your wound care team will discuss specific ways for you to offload your specific wound. Common offloading strategies include:  Turn or reposition every 2 hours or sooner  Use pillows, wedges, ROHO wheelchair cushions or other special devices like boots and shoes to lift the wound off of hard surfaces  Alternating Low Air-loss (ALAL) mattress may be ordered  Padded dressings can reduce wound pressure      Call our [facility] wound clinic for questions/concerns a [phone] .

## 2023-08-11 ENCOUNTER — HOSPITAL ENCOUNTER (OUTPATIENT)
Dept: WOUND CARE | Facility: HOSPITAL | Age: 88
Discharge: HOME | End: 2023-08-11
Attending: EMERGENCY MEDICINE
Payer: MEDICARE

## 2023-08-11 VITALS
HEART RATE: 64 BPM | BODY MASS INDEX: 26.56 KG/M2 | HEIGHT: 74 IN | SYSTOLIC BLOOD PRESSURE: 125 MMHG | WEIGHT: 207 LBS | TEMPERATURE: 98 F | DIASTOLIC BLOOD PRESSURE: 65 MMHG | RESPIRATION RATE: 18 BRPM

## 2023-08-11 DIAGNOSIS — I87.332 CHRONIC VENOUS HYPERTENSION WITH ULCER AND INFLAMMATION, LEFT: ICD-10-CM

## 2023-08-11 DIAGNOSIS — L97.222 CHRONIC ULCER OF LEFT CALF WITH FAT LAYER EXPOSED: ICD-10-CM

## 2023-08-11 DIAGNOSIS — L97.922 CHRONIC ULCER OF LEFT LEG WITH FAT LAYER EXPOSED: Primary | ICD-10-CM

## 2023-08-11 DIAGNOSIS — I70.202 ATHEROSCLEROSIS OF LEFT LEG: ICD-10-CM

## 2023-08-11 PROCEDURE — 99499 NO LOS: ICD-10-PCS | Mod: ,,, | Performed by: EMERGENCY MEDICINE

## 2023-08-11 PROCEDURE — 99499 UNLISTED E&M SERVICE: CPT | Mod: ,,, | Performed by: EMERGENCY MEDICINE

## 2023-08-11 PROCEDURE — 27000999 HC MEDICAL RECORD PHOTO DOCUMENTATION

## 2023-08-11 PROCEDURE — 11042 DBRDMT SUBQ TIS 1ST 20SQCM/<: CPT

## 2023-08-11 PROCEDURE — 11042 DEBRIDEMENT: ICD-10-PCS | Mod: ,,, | Performed by: EMERGENCY MEDICINE

## 2023-08-11 PROCEDURE — 11042 DBRDMT SUBQ TIS 1ST 20SQCM/<: CPT | Mod: ,,, | Performed by: EMERGENCY MEDICINE

## 2023-08-11 NOTE — PATIENT INSTRUCTIONS
Pt seen today by: Dr. Perez    Supplies orderd on 6/29/[facility]23 from [facility]; pt received his supplies.    Self care DRESSING INSTRUCTIONS:      Wound location: Left anterior/ Posterior Leg  Cleanse wound with wound cleanser or saline  Apply gentamycin ointment covered by NS Moistened hydra fera blue to the wound bed  Cover with exudry or ABD pad and secure with kerix and cover-all tape  Dressings to be changed Every Other Day and as needed if soiled or not intact    Wound location: Rt leg  Cleanse wound with wound cleanser or Normal saline  Apply gentamycin under a bandaid  Dressing to be changed every other day    Wound location: Rt foot  Cleanse wound with wound cleanser or Normal saline  Apply large bandaid  Change every other day          Moisturize dry skin on feet    Compression with: N/A    Return visit:  Thursday, August 17, 2023 at 2:30    Wound may have been debrided in clinic: if so, WHAT YOU NEED TO KNOW:  Debridement is the removal of infected, damaged, or dead tissue so a wound can heal properly. Your wound may need more than one debridement. Debridement can cause bleeding, and a small amount of blood is expected.  AFTER A DEBRIDEMENT:  Keep your wound clean and dry. Do not remove the dressing unless instructed.  Follow the wound care orders provided to you or your home health care provider.  If you have pain, take over the counter pain relievers or pain medication if prescribed.  Elevate the wound and limit excessive activity to prevent bleeding and/or swelling in your wound.  If you see blood coming through the dressing, apply gauze and tape over the dressing and hold firm pressure to the wound with your hand for 5-10 minutes continuously, without peeking, to help the bleeding stop.    Contact [facility] wound care team at [phone] or go to the nearest Emergency department if:  You have a fever greater than 101 taken by mouth.  Your pain gets worse or does not go away, even after taking your regular pain  medicine.  Your skin around your wound is red, hot, swollen, or draining pus.  You have bleeding that continues to come through the dressing after holding pressure for 10 minutes     Nutrition:  The current daily value (%DV) for protein is 50 grams per day and is meant as a general goal for most people. Further increasing your dietary protein intake is very important for wound healing. Typically one needs over 100g of protein per day to help with wound healing needs.  If you are a dialysis patient or have problems with your kidneys, talk to your Nephrologist about how much protein you can take in with your condition.  Examples of high protein items that can be added to your diet include: eggs, chicken, red meats, almonds, cottage cheese, Greek yogurt, beans, and peanut butter.  Fortified protein bars, shakes and drinks can add 15-30 additional grams of protein per serving.   Also add:   1 daily general multivitamin   Uri : 1 packet twice daily   Vitamin C : 500mg twice daily   Zinc 220 mg daily  Vit D : once daily    Offloading:  Offload your wound. This means to reduce pressure on and around the wound that reduces blood flow to the wound and prevents healing. Your wound care team will discuss specific ways for you to offload your specific wound. Common offloading strategies include:  Turn or reposition every 2 hours or sooner  Use pillows, wedges, ROHO wheelchair cushions or other special devices like boots and shoes to lift the wound off of hard surfaces  Alternating Low Air-loss (ALAL) mattress may be ordered  Padded dressings can reduce wound pressure      Call our [facility] wound clinic for questions/concerns a [phone] .

## 2023-08-14 NOTE — PROGRESS NOTES
Subjective:       Patient ID: Donovan Enriquez is a 88 y.o. male.    Chief Complaint: No chief complaint on file.      CC: LLE ulcers      87 y/o WM referred to this Wound Care Clinic from a rehab facility. This patient has a history of hypertension, neuropathy,mixed PVD with ulcers, lymphedema, CAD, cardiomegaly, asthma on chronic  Plavix who was admitted to [facility] on 6/9/23 for shortness of breath and hypoxia.  He was ultimately diagnosed with a GI bleed and severe anemia.  He was treated with 4 blood transfusions and a GI workup with upper scope and intervention.  He was then transferred to [facility] on 6/12/23 where he stayed though  6/26/23.  It was noted upon arrival to [facility] he had multiple LLE ulcers which had been present all of 2023, maybe longer.. +wound cx for Pseudomonas so was  given cipro 500mg bid on 6/25/23.   Dr Higgins has done both angiograms and venograms in the past.   Search of Epic: note April 2022 by Dr ANKIT Higgins    Angiogram: PTA of the left tibioperoneal trunk, posterior tibial artery and anterior tibial artery.   Venogram :right-sided iliofemoral venous compression syndrome noted:  right external iliac vein, common iliac vein PTV and stenting    I debrided and advised of dressings of gentamicin ointment under moistened hydrofera blue; And referred back to Gisela. Pt declined home health and said his wife would do the dressings  I have been seeing weekly since then and ulcers are better but slow.  He reports to me today that Dr Higgins did an angiogram with intervention in past few days; I do not know details. Also says his right big toe dusky but Gisela saw it and will monitor and planning RLE angiogram as well. No pain to feet    Comes in today on 8/3/23 with new skin tear LUE and a new superficial ulcer on RLE: he hit his arm on car door on way here but not sure about the RLE        Review of Systems   Constitutional: Negative.    HENT: Negative.     Respiratory: Negative.  "    Gastrointestinal: Negative.    Neurological: Negative.          Objective:      Vitals:    08/11/23 1111   BP: 125/65   Pulse: 64   Resp: 18   Temp: 98.4 °F (36.9 °C)     @poctglucose@  No results for input(s): "POCTGLUCOSE" in the last 24 hours.  Physical Exam  Vitals reviewed.   Constitutional:       Comments: Elderly ; no distress   HENT:      Head: Normocephalic and atraumatic.   Cardiovascular:      Pulses:           Dorsalis pedis pulses are 1+ on the right side and 1+ on the left side.   Pulmonary:      Effort: Pulmonary effort is normal.   Musculoskeletal:        Legs:    Feet:      Comments: Right big toe does look dusky to me as well; no ulcers  Skin:     Capillary Refill: Capillary refill takes less than 2 seconds.   Neurological:      General: No focal deficit present.      Mental Status: He is alert and oriented to person, place, and time. Mental status is at baseline.              Altered Skin Integrity 07/06/23 1416 Left anterior;lower Leg #1 Venous Ulcer (Active)   07/06/23 1416   Altered Skin Integrity Present on Admission - Did Patient arrive to the hospital with altered skin?: yes   Side: Left   Orientation: anterior;lower   Location: Leg   Wound Number: #1   Is this injury device related?: No   Primary Wound Type: Venous ulcer   Description of Altered Skin Integrity:    Ankle-Brachial Index: Done 7/27/23 R DP: 0.98 PT: 1.04/ L DP: 0.89  PT:  0.85   Pulses: Palpable X2; + Doppler- biphasic   Removal Indication and Assessment:    Wound Outcome:    (Retired) Wound Length (cm):    (Retired) Wound Width (cm):    (Retired) Depth (cm):    Wound Description (Comments):    Removal Indications:    Wound Image    08/11/23 1127   Description of Altered Skin Integrity Full thickness tissue loss. Subcutaneous fat may be visible but bone, tendon or muscle are not exposed 08/11/23 1127   Dressing Appearance Intact;Moist drainage 08/11/23 1127   Drainage Amount Moderate 08/11/23 1127   Drainage " Characteristics/Odor Yellow;Serosanguineous 08/11/23 1127   Appearance Pink;Yellow 08/11/23 1127   Tissue loss description Full thickness 08/11/23 1127   Black (%), Wound Tissue Color 0 % 08/11/23 1127   Red (%), Wound Tissue Color 90 % 08/11/23 1127   Yellow (%), Wound Tissue Color 10 % 08/11/23 1127   Periwound Area Intact 08/11/23 1127   Wound Edges Defined 08/11/23 1127   Wound Length (cm) 2 cm 08/11/23 1127   Wound Width (cm) 1.2 cm 08/11/23 1127   Wound Depth (cm) 0.1 cm 08/11/23 1127   Wound Volume (cm^3) 0.24 cm^3 08/11/23 1127   Wound Surface Area (cm^2) 2.4 cm^2 08/11/23 1127   Care Cleansed with:;Soap and water;Wound cleanser;Applied: 08/11/23 1127   Dressing Removed;Changed;Absorptive Pad;Rolled gauze 08/11/23 1127            Altered Skin Integrity 07/06/23 1417 Left lower;posterior Calf #2 Venous Ulcer (Active)   07/06/23 1417   Altered Skin Integrity Present on Admission - Did Patient arrive to the hospital with altered skin?: yes   Side: Left   Orientation: lower;posterior   Location: Calf   Wound Number: #2   Is this injury device related?: No   Primary Wound Type: Venous ulcer   Description of Altered Skin Integrity:    Ankle-Brachial Index: Done 6/29/23 R DP: 0.98 PT: 1.04/ L DP: 0.89  PT:  0.85   Pulses: palpable x2; + Doppler- biphasic x4   Removal Indication and Assessment:    Wound Outcome:    (Retired) Wound Length (cm):    (Retired) Wound Width (cm):    (Retired) Depth (cm):    Wound Description (Comments):    Removal Indications:    Wound Image    08/11/23 1128   Description of Altered Skin Integrity Full thickness tissue loss. Subcutaneous fat may be visible but bone, tendon or muscle are not exposed 08/11/23 1128   Dressing Appearance Intact;Moist drainage 08/11/23 1128   Drainage Amount Moderate 08/11/23 1128   Drainage Characteristics/Odor Yellow;Serosanguineous 08/11/23 1128   Appearance Pink;Yellow 08/11/23 1128   Tissue loss description Full thickness 08/11/23 1128   Black (%), Wound  Tissue Color 0 % 08/11/23 1128   Red (%), Wound Tissue Color 80 % 08/11/23 1128   Yellow (%), Wound Tissue Color 20 % 08/11/23 1128   Periwound Area Intact 08/11/23 1128   Wound Edges Defined 08/11/23 1128   Wound Length (cm) 0.8 cm 08/11/23 1128   Wound Width (cm) 0.6 cm 08/11/23 1128   Wound Depth (cm) 0.3 cm 08/11/23 1128   Wound Volume (cm^3) 0.144 cm^3 08/11/23 1128   Wound Surface Area (cm^2) 0.48 cm^2 08/11/23 1128   Care Cleansed with:;Wound cleanser;Applied: 08/11/23 1128   Dressing Removed;Changed;Rolled gauze;Tubular bandage;Absorptive Pad;Other (comment) 08/11/23 1128            Altered Skin Integrity 08/03/23 1350 Left medial Arm #3 Skin Tear (Active)   08/03/23 1350   Altered Skin Integrity Present on Admission - Did Patient arrive to the hospital with altered skin?: yes   Side: Left   Orientation: medial   Location: Arm   Wound Number: #3   Is this injury device related?: Yes   Primary Wound Type: Skin tear   Description of Altered Skin Integrity:    Ankle-Brachial Index:    Pulses:    Removal Indication and Assessment:    Wound Outcome:    (Retired) Wound Length (cm):    (Retired) Wound Width (cm):    (Retired) Depth (cm):    Wound Description (Comments):    Removal Indications:    Wound Image   08/11/23 1129   Dressing Appearance Open to air 08/11/23 1129   Drainage Amount None 08/11/23 1129   Appearance Eschar 08/11/23 1129   Black (%), Wound Tissue Color 0 % 08/11/23 1129   Red (%), Wound Tissue Color 0 % 08/11/23 1129   Yellow (%), Wound Tissue Color 0 % 08/11/23 1129   Periwound Area Intact 08/11/23 1129   Wound Edges Undefined 08/11/23 1129   Wound Length (cm) 0.8 cm 08/11/23 1129   Wound Width (cm) 0.4 cm 08/11/23 1129   Wound Depth (cm) 0 cm 08/11/23 1129   Wound Volume (cm^3) 0 cm^3 08/11/23 1129   Wound Surface Area (cm^2) 0.32 cm^2 08/11/23 1129   Care Cleansed with:;Wound cleanser 08/11/23 1129            Altered Skin Integrity 08/03/23 1352 Right lower Leg #4 Venous Ulcer (Active)    08/03/23 1352   Altered Skin Integrity Present on Admission - Did Patient arrive to the hospital with altered skin?: yes   Side: Right   Orientation: lower   Location: Leg   Wound Number: #4   Is this injury device related?: No   Primary Wound Type: Venous ulcer   Description of Altered Skin Integrity:    Ankle-Brachial Index:    Pulses:    Removal Indication and Assessment:    Wound Outcome:    (Retired) Wound Length (cm):    (Retired) Wound Width (cm):    (Retired) Depth (cm):    Wound Description (Comments):    Removal Indications:    Wound Image   08/11/23 1130   Description of Altered Skin Integrity Partial thickness tissue loss. Shallow open ulcer with a red or pink wound bed, without slough. Intact or Open/Ruptured Serum-filled blister. 08/11/23 1130   Dressing Appearance Intact;Moist drainage 08/11/23 1130   Drainage Amount Small 08/11/23 1130   Drainage Characteristics/Odor Yellow;Serosanguineous 08/11/23 1130   Appearance Pink;Yellow 08/11/23 1130   Tissue loss description Partial thickness 08/11/23 1130   Black (%), Wound Tissue Color 0 % 08/11/23 1130   Red (%), Wound Tissue Color 80 % 08/11/23 1130   Yellow (%), Wound Tissue Color 20 % 08/11/23 1130   Periwound Area Intact 08/11/23 1130   Wound Edges Defined 08/11/23 1130   Wound Length (cm) 0.2 cm 08/11/23 1130   Wound Width (cm) 0.3 cm 08/11/23 1130   Wound Depth (cm) 0.1 cm 08/11/23 1130   Wound Volume (cm^3) 0.006 cm^3 08/11/23 1130   Wound Surface Area (cm^2) 0.06 cm^2 08/11/23 1130   Care Cleansed with:;Soap and water;Wound cleanser;Applied: 08/11/23 1130   Dressing Bandaid 08/11/23 1130            Altered Skin Integrity 08/11/23 1120 Left dorsal Foot #5 Blister(s) (Active)   08/11/23 1120   Altered Skin Integrity Present on Admission - Did Patient arrive to the hospital with altered skin?: yes   Side: Left   Orientation: dorsal   Location: Foot   Wound Number: #5   Is this injury device related?: No   Primary Wound Type: Blister(s)    Description of Altered Skin Integrity:    Ankle-Brachial Index:    Pulses:    Removal Indication and Assessment:    Wound Outcome:    (Retired) Wound Length (cm):    (Retired) Wound Width (cm):    (Retired) Depth (cm):    Wound Description (Comments):    Removal Indications:    Wound Image   08/11/23 1131   Description of Altered Skin Integrity Partial thickness tissue loss. Shallow open ulcer with a red or pink wound bed, without slough. Intact or Open/Ruptured Serum-filled blister. 08/11/23 1131   Dressing Appearance Intact 08/11/23 1131   Drainage Amount Moderate 08/11/23 1131   Drainage Characteristics/Odor Yellow;Serosanguineous 08/11/23 1131   Appearance Red;Epithelialization 08/11/23 1131   Tissue loss description Partial thickness 08/11/23 1131   Black (%), Wound Tissue Color 0 % 08/11/23 1131   Red (%), Wound Tissue Color 100 % 08/11/23 1131   Yellow (%), Wound Tissue Color 0 % 08/11/23 1131   Periwound Area Blistered 08/11/23 1131   Wound Edges Rolled/closed 08/11/23 1131   Wound Length (cm) 1.3 cm 08/11/23 1131   Wound Width (cm) 1.4 cm 08/11/23 1131   Wound Depth (cm) 0.1 cm 08/11/23 1131   Wound Volume (cm^3) 0.182 cm^3 08/11/23 1131   Wound Surface Area (cm^2) 1.82 cm^2 08/11/23 1131   Care Cleansed with:;Soap and water;Wound cleanser;Applied: 08/11/23 1131   Dressing Bandaid 08/11/23 1131            Altered Skin Integrity 08/11/23 1157 Right Toe, first #6 Other (comment) (Active)   08/11/23 1157   Altered Skin Integrity Present on Admission - Did Patient arrive to the hospital with altered skin?: yes   Side: Right   Orientation:    Location: Toe, first   Wound Number: #6   Is this injury device related?: No   Primary Wound Type: Other   Description of Altered Skin Integrity:    Ankle-Brachial Index:    Pulses:    Removal Indication and Assessment:    Wound Outcome:    (Retired) Wound Length (cm):    (Retired) Wound Width (cm):    (Retired) Depth (cm):    Wound Description (Comments):    Removal  Indications:    Wound Image   08/11/23 1158   Description of Altered Skin Integrity Intact skin with non-blanchable redness of localized area 08/11/23 1158   Dressing Appearance Open to air 08/11/23 1158   Drainage Amount None 08/11/23 1158   Appearance Purple 08/11/23 1158   Black (%), Wound Tissue Color 0 % 08/11/23 1158   Red (%), Wound Tissue Color 0 % 08/11/23 1158   Yellow (%), Wound Tissue Color 0 % 08/11/23 1158   Wound Length (cm) 0 cm 08/11/23 1158   Wound Width (cm) 0 cm 08/11/23 1158   Wound Depth (cm) 0 cm 08/11/23 1158   Wound Volume (cm^3) 0 cm^3 08/11/23 1158   Wound Surface Area (cm^2) 0 cm^2 08/11/23 1158             Assessment:       1. 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 Chronic venous hypertension with ulcer and inflammation, left    4. Atherosclerosis of left leg          LLE ulcers/chronic since late 2022 per patient: first clinic visit 6/29/23: slow improvement  Anemia with severe GI Bleed early June 2023: treated at [facility]: multiple blood transfusions, upper GI with intervention  Mixed PVD: treated by Dr Higgins: 2022 Lower extremity angiography and venogram because of chronic LLE ulcers; h/o ulcer both legs/ankles in past   Angiogram 2022: PTA of the left tibioperoneal trunk, posterior tibial artery and anterior tibial artery.   Another angiogram Aug 23: need records  Venogram 2022 :right-sided iliofemoral venous compression syndrome noted:  right external iliac vein, common iliac vein PTV and stenting  Anemia with severe GI Bleed early June 2023: treated at [facility]: multiple blood transfusions, upper GI with intervention  Chronic plavix therapy  CAD  Asthma  Hypertension  Dusky Right big toe        Plan:     Plan of Care:    Debrided both LLE ulcers  Wound Care Orders: dressings of gentamicin ointment under moistened hydrofera blue; every other day; declines HH  Pt had more intervention on LLE/angio  Dusky RLE big  toe:says Gisela saw it this week and aware. Plans for angio he says   Nutrition: Must have a high protein diet to support wound  healing; (if renal disease, see nephrologist for amount allowed):  this should be over 100g protein /day (if no kidney issues); Also rec MVI along with vit C, vit D, zinc and Uri  Compression: not yet; don't know arterial status; having vascular eval with Gisela soon   Return to clinic 1 week

## 2023-08-14 NOTE — PROCEDURES
"Debridement    Date/Time: 8/11/2023 11:00 AM    Performed by: Liliana Guo MD  Authorized by: Liliana Guo MD    Time out: Immediately prior to procedure a "time out" was called to verify the correct patient, procedure, equipment, support staff and site/side marked as required.    Consent Done?:  Yes (Written)  Local anesthesia used?: Yes    Local anesthetic:  Topical anesthetic    Wound Details:    Location:  Left leg    Type of Debridement:  Excisional       Length (cm):  2       Area (sq cm):  2.4       Width (cm):  1.2       Percent Debrided (%):  100       Depth (cm):  0.1       Total Area Debrided (sq cm):  2.4    Depth of debridement:  Subcutaneous tissue    Tissue debrided:  Dermis, Epidermis and Subcutaneous    Devitalized tissue debrided:  Biofilm and Slough    2nd Wound Details:     Location:  Left leg (calf)    Type of Debridement:  Excisional       Length (cm):  0.8       Area (sq cm):  0.48       Width (cm):  0.6       Percent Debrided (%):  100       Depth (cm):  0.3       Total Area Debrided (sq cm):  0.48    Depth of debridement:  Subcutaneous tissue    Tissue debrided:  Epidermis, Dermis and Subcutaneous    Devitalized tissue debrided:  Biofilm and Slough    Instruments:  Curette  Bleeding:  Minimal  Patient tolerance:  Patient tolerated the procedure well with no immediate complications    "

## 2023-08-17 ENCOUNTER — HOSPITAL ENCOUNTER (OUTPATIENT)
Dept: WOUND CARE | Facility: HOSPITAL | Age: 88
Discharge: HOME | End: 2023-08-17
Attending: EMERGENCY MEDICINE
Payer: MEDICARE

## 2023-08-17 VITALS
HEIGHT: 74 IN | RESPIRATION RATE: 20 BRPM | DIASTOLIC BLOOD PRESSURE: 66 MMHG | HEART RATE: 79 BPM | SYSTOLIC BLOOD PRESSURE: 99 MMHG | TEMPERATURE: 97 F | BODY MASS INDEX: 26.56 KG/M2 | WEIGHT: 207 LBS

## 2023-08-17 DIAGNOSIS — A49.8 PSEUDOMONAS AERUGINOSA INFECTION: ICD-10-CM

## 2023-08-17 DIAGNOSIS — L97.222 CHRONIC ULCER OF LEFT CALF WITH FAT LAYER EXPOSED: ICD-10-CM

## 2023-08-17 DIAGNOSIS — I87.332 CHRONIC VENOUS HYPERTENSION WITH ULCER AND INFLAMMATION, LEFT: ICD-10-CM

## 2023-08-17 DIAGNOSIS — D64.9 ANEMIA, UNSPECIFIED TYPE: ICD-10-CM

## 2023-08-17 DIAGNOSIS — L97.922 CHRONIC ULCER OF LEFT LEG WITH FAT LAYER EXPOSED: ICD-10-CM

## 2023-08-17 DIAGNOSIS — I70.202 ATHEROSCLEROSIS OF LEFT LEG: ICD-10-CM

## 2023-08-17 DIAGNOSIS — I10 HYPERTENSION, UNSPECIFIED TYPE: ICD-10-CM

## 2023-08-17 PROCEDURE — 11042 DBRDMT SUBQ TIS 1ST 20SQCM/<: CPT

## 2023-08-17 PROCEDURE — 11042 DEBRIDEMENT: ICD-10-PCS | Mod: ,,, | Performed by: EMERGENCY MEDICINE

## 2023-08-17 PROCEDURE — 27000999 HC MEDICAL RECORD PHOTO DOCUMENTATION

## 2023-08-17 PROCEDURE — 11042 DBRDMT SUBQ TIS 1ST 20SQCM/<: CPT | Mod: ,,, | Performed by: EMERGENCY MEDICINE

## 2023-08-17 PROCEDURE — 99499 UNLISTED E&M SERVICE: CPT | Mod: ,,, | Performed by: EMERGENCY MEDICINE

## 2023-08-17 PROCEDURE — 99499 NO LOS: ICD-10-PCS | Mod: ,,, | Performed by: EMERGENCY MEDICINE

## 2023-08-17 NOTE — PATIENT INSTRUCTIONS
Pt seen today by: Dr. Brant MD    Supplies orderd on 6/29/2023 from [facility] [place]; pt received his supplies.    Self care DRESSING INSTRUCTIONS:      Wound location: Left anterior/ Posterior Leg  Cleanse wound with wound cleanser or saline  Apply gentamycin ointment covered by NS Moistened hydra fera blue to the wound bed  Cover with exudry or ABD pad and secure with kerix and cover-all tape  Dressings to be changed Every Other Day and as needed if soiled or not intact    Wound location: Rt leg  Cleanse cleanse with soap and water  Pat dry  Moisturize any dry skin    Wound location: Lt foot  Cleanse wound with wound cleanser or Normal saline  Apply large bandaid  Change every other day      Moisturize dry skin on feet        Compression with: N/A        Return visit:  Thursday, August 31, 2023 at 2:00    Wound may have been debrided in clinic: if so, WHAT YOU NEED TO KNOW:  Debridement is the removal of infected, damaged, or dead tissue so a wound can heal properly. Your wound may need more than one debridement. Debridement can cause bleeding, and a small amount of blood is expected.  AFTER A DEBRIDEMENT:  Keep your wound clean and dry. Do not remove the dressing unless instructed.  Follow the wound care orders provided to you or your home health care provider.  If you have pain, take over the counter pain relievers or pain medication if prescribed.  Elevate the wound and limit excessive activity to prevent bleeding and/or swelling in your wound.  If you see blood coming through the dressing, apply gauze and tape over the dressing and hold firm pressure to the wound with your hand for 5-10 minutes continuously, without peeking, to help the bleeding stop.    Contact [facility] wound care team at [phone] or go to the nearest Emergency department if:  You have a fever greater than 101 taken by mouth.  Your pain gets worse or does not go away, even after taking your regular pain medicine.  Your skin around your wound is  red, hot, swollen, or draining pus.  You have bleeding that continues to come through the dressing after holding pressure for 10 minutes     Nutrition:  The current daily value (%DV) for protein is 50 grams per day and is meant as a general goal for most people. Further increasing your dietary protein intake is very important for wound healing. Typically one needs over 100g of protein per day to help with wound healing needs.  If you are a dialysis patient or have problems with your kidneys, talk to your Nephrologist about how much protein you can take in with your condition.  Examples of high protein items that can be added to your diet include: eggs, chicken, red meats, almonds, cottage cheese, Greek yogurt, beans, and peanut butter.  Fortified protein bars, shakes and drinks can add 15-30 additional grams of protein per serving.   Also add:   1 daily general multivitamin   Uri : 1 packet twice daily   Vitamin C : 500mg twice daily   Zinc 220 mg daily  Vit D : once daily    Offloading:  Offload your wound. This means to reduce pressure on and around the wound that reduces blood flow to the wound and prevents healing. Your wound care team will discuss specific ways for you to offload your specific wound. Common offloading strategies include:  Turn or reposition every 2 hours or sooner  Use pillows, wedges, ROHO wheelchair cushions or other special devices like boots and shoes to lift the wound off of hard surfaces  Alternating Low Air-loss (ALAL) mattress may be ordered  Padded dressings can reduce wound pressure      Call our [facility] wound clinic for questions/concerns a [phone] .

## 2023-08-29 NOTE — PROCEDURES
"Debridement    Date/Time: 8/17/2023 2:30 PM    Performed by: Liliana Guo MD  Authorized by: Liliana Guo MD  Associated wounds:        Altered Skin Integrity 07/06/23 1416 Left anterior;lower Leg #1 Venous Ulcer       Altered Skin Integrity 07/06/23 1417 Left lower;posterior Calf #2 Venous Ulcer  Time out: Immediately prior to procedure a "time out" was called to verify the correct patient, procedure, equipment, support staff and site/side marked as required.    Local anesthetic:  Topical anesthetic    Wound Details:    Location:  Left leg    Type of Debridement:  Excisional       Length (cm):  2.8       Area (sq cm):  5.32       Width (cm):  1.9       Percent Debrided (%):  100       Depth (cm):  0.2       Total Area Debrided (sq cm):  5.32    Depth of debridement:  Subcutaneous tissue    Tissue debrided:  Dermis, Epidermis and Subcutaneous    Devitalized tissue debrided:  Biofilm and Slough    Instruments:  Curette  Bleeding:  Minimal  Hemostasis Achieved: Yes    2nd Wound Details:     Location:  Left leg (calf)    Type of Debridement:  Excisional       Length (cm):  0.8       Area (sq cm):  0.4       Width (cm):  0.5       Percent Debrided (%):  100       Depth (cm):  0.3       Total Area Debrided (sq cm):  0.4    Depth of debridement:  Subcutaneous tissue    Tissue debrided:  Dermis, Epidermis and Subcutaneous    Devitalized tissue debrided:  Biofilm and Slough    Instruments:  Curette  Bleeding:  Minimal  Hemostasis Achieved: Yes    Method Used:  Pressure  Patient tolerance:  Patient tolerated the procedure well with no immediate complications    "

## 2023-08-29 NOTE — PROGRESS NOTES
Subjective:       Patient ID: Donovan Enriquez is a 88 y.o. male.    Chief Complaint: No chief complaint on file.      CC: LLE ulcers      89 y/o WM referred to this Wound Care Clinic from a rehab facility. This patient has a history of hypertension, neuropathy,mixed PVD with ulcers, lymphedema, CAD, cardiomegaly, asthma on chronic  Plavix who was admitted to [facility] on 6/9/23 for shortness of breath and hypoxia.  He was ultimately diagnosed with a GI bleed and severe anemia.  He was treated with 4 blood transfusions and a GI workup with upper scope and intervention.  He was then transferred [facility] where he stayed though  6/26/23.  It was noted upon [facility] he had multiple LLE ulcers which had been present all of 2023, maybe longer.. +wound cx for Pseudomonas so was  given cipro 500mg bid on 6/25/23.   Dr Higgins has done both angiograms and venograms in the past.   Search of Epic: note April 2022 by Dr ANKIT Higgins    Angiogram: PTA of the left tibioperoneal trunk, posterior tibial artery and anterior tibial artery.   Venogram :right-sided iliofemoral venous compression syndrome noted:  right external iliac vein, common iliac vein PTV and stenting    I debrided and advised of dressings of gentamicin ointment under moistened hydrofera blue; And referred back to [facility]. Pt declined home health and said his wife would do the dressings  I have been seeing weekly since then and ulcers have improved.  Dr Higgins did do an angiogram or venogram on LLE; pt had reported for some reason Right big toe looked dusky after that (opposite leg) which I also noted last week but seems to have resolved today        Review of Systems   Constitutional: Negative.    HENT: Negative.     Respiratory: Negative.     Gastrointestinal: Negative.    Neurological: Negative.          Objective:      Vitals:    08/17/23 1512   BP: 99/66   Pulse: 79   Resp: 20   Temp: 97.4 °F (36.3 °C)     @poctglucose@  No  "results for input(s): "POCTGLUCOSE" in the last 24 hours.  Physical Exam  Vitals reviewed.   Constitutional:       Comments: Elderly ; no distress   HENT:      Head: Normocephalic and atraumatic.   Cardiovascular:      Pulses:           Dorsalis pedis pulses are 1+ on the right side and 1+ on the left side.   Pulmonary:      Effort: Pulmonary effort is normal.   Musculoskeletal:        Legs:    Feet:      Comments: Right big toe does look dusky to me as well; no ulcers  Skin:     Capillary Refill: Capillary refill takes less than 2 seconds.   Neurological:      General: No focal deficit present.      Mental Status: He is alert and oriented to person, place, and time. Mental status is at baseline.              Altered Skin Integrity 07/06/23 1416 Left anterior;lower Leg #1 Venous Ulcer (Active)   07/06/23 1416   Altered Skin Integrity Present on Admission - Did Patient arrive to the hospital with altered skin?: yes   Side: Left   Orientation: anterior;lower   Location: Leg   Wound Number: #1   Is this injury device related?: No   Primary Wound Type: Venous ulcer   Description of Altered Skin Integrity:    Ankle-Brachial Index: Done 7/27/23 R DP: 0.98 PT: 1.04/ L DP: 0.89  PT:  0.85   Pulses: Palpable X2; + Doppler- biphasic   Removal Indication and Assessment:    Wound Outcome:    (Retired) Wound Length (cm):    (Retired) Wound Width (cm):    (Retired) Depth (cm):    Wound Description (Comments):    Removal Indications:    Wound Image   08/17/23 1459   Dressing Appearance Intact;Moist drainage 08/17/23 1459   Drainage Amount Moderate 08/17/23 1459   Drainage Characteristics/Odor Yellow;No odor 08/17/23 1459   Appearance Pink;Yellow 08/17/23 1459   Tissue loss description Full thickness 08/17/23 1459   Black (%), Wound Tissue Color 0 % 08/17/23 1459   Red (%), Wound Tissue Color 70 % 08/17/23 1459   Yellow (%), Wound Tissue Color 30 % 08/17/23 1459   Periwound Area Intact;Dry 08/17/23 1459   Wound Edges Defined " 08/17/23 1459   Wound Length (cm) 2.8 cm 08/17/23 1459   Wound Width (cm) 1.9 cm 08/17/23 1459   Wound Depth (cm) 0.2 cm 08/17/23 1459   Wound Volume (cm^3) 1.064 cm^3 08/17/23 1459   Wound Surface Area (cm^2) 5.32 cm^2 08/17/23 1459   Care Cleansed with:;Wound cleanser;Applied: 08/17/23 1459   Dressing Applied;Absorptive Pad;Rolled gauze 08/17/23 1515   Periwound Care Moisturizer applied;Absorptive dressing applied 08/17/23 1515            Altered Skin Integrity 07/06/23 1417 Left lower;posterior Calf #2 Venous Ulcer (Active)   07/06/23 1417   Altered Skin Integrity Present on Admission - Did Patient arrive to the hospital with altered skin?: yes   Side: Left   Orientation: lower;posterior   Location: Calf   Wound Number: #2   Is this injury device related?: No   Primary Wound Type: Venous ulcer   Description of Altered Skin Integrity:    Ankle-Brachial Index: Done 6/29/23 R DP: 0.98 PT: 1.04/ L DP: 0.89  PT:  0.85   Pulses: palpable x2; + Doppler- biphasic x4   Removal Indication and Assessment:    Wound Outcome:    (Retired) Wound Length (cm):    (Retired) Wound Width (cm):    (Retired) Depth (cm):    Wound Description (Comments):    Removal Indications:    Wound Image   08/17/23 1459   Dressing Appearance Moist drainage 08/17/23 1459   Drainage Amount Moderate 08/17/23 1459   Drainage Characteristics/Odor Yellow;No odor 08/17/23 1459   Appearance Yellow;Pink 08/17/23 1459   Tissue loss description Full thickness 08/17/23 1459   Black (%), Wound Tissue Color 0 % 08/17/23 1459   Red (%), Wound Tissue Color 10 % 08/17/23 1459   Yellow (%), Wound Tissue Color 90 % 08/17/23 1459   Periwound Area Intact;Dry 08/17/23 1459   Wound Edges Defined 08/17/23 1459   Wound Length (cm) 0.8 cm 08/17/23 1459   Wound Width (cm) 0.5 cm 08/17/23 1459   Wound Depth (cm) 0.3 cm 08/17/23 1459   Wound Volume (cm^3) 0.12 cm^3 08/17/23 1459   Wound Surface Area (cm^2) 0.4 cm^2 08/17/23 1459   Care Cleansed with:;Wound cleanser;Applied:  08/17/23 1459   Dressing Applied;Absorptive Pad;Rolled gauze 08/17/23 1515   Periwound Care Absorptive dressing applied 08/17/23 1515            Altered Skin Integrity 08/11/23 1120 Left dorsal Foot #5 Blister(s) (Active)   08/11/23 1120   Altered Skin Integrity Present on Admission - Did Patient arrive to the hospital with altered skin?: yes   Side: Left   Orientation: dorsal   Location: Foot   Wound Number: #5   Is this injury device related?: No   Primary Wound Type: Blister(s)   Description of Altered Skin Integrity:    Ankle-Brachial Index:    Pulses:    Removal Indication and Assessment:    Wound Outcome:    (Retired) Wound Length (cm):    (Retired) Wound Width (cm):    (Retired) Depth (cm):    Wound Description (Comments):    Removal Indications:    Wound Image   08/17/23 1459   Dressing Appearance Intact;Moist drainage 08/17/23 1459   Drainage Amount Moderate 08/17/23 1459   Drainage Characteristics/Odor Serosanguineous;Yellow 08/17/23 1459   Appearance Red 08/17/23 1459   Tissue loss description Partial thickness 08/17/23 1459   Black (%), Wound Tissue Color 0 % 08/17/23 1459   Red (%), Wound Tissue Color 100 % 08/17/23 1459   Yellow (%), Wound Tissue Color 0 % 08/17/23 1459   Periwound Area Macerated;Pale white;Intact 08/17/23 1459   Wound Edges Defined 08/17/23 1459   Wound Length (cm) 1.2 cm 08/17/23 1459   Wound Width (cm) 1.7 cm 08/17/23 1459   Wound Depth (cm) 0.1 cm 08/17/23 1459   Wound Volume (cm^3) 0.204 cm^3 08/17/23 1459   Wound Surface Area (cm^2) 2.04 cm^2 08/17/23 1459   Care Cleansed with:;Wound cleanser;Applied: 08/17/23 1459   Dressing Applied;Bandaid 08/17/23 1515            Altered Skin Integrity 08/11/23 1157 Right Toe, first #6 Other (comment) (Active)   08/11/23 1157   Altered Skin Integrity Present on Admission - Did Patient arrive to the hospital with altered skin?: yes   Side: Right   Orientation:    Location: Toe, first   Wound Number: #6   Is this injury device related?: No    Primary Wound Type: Other   Description of Altered Skin Integrity:    Ankle-Brachial Index:    Pulses:    Removal Indication and Assessment:    Wound Outcome:    (Retired) Wound Length (cm):    (Retired) Wound Width (cm):    (Retired) Depth (cm):    Wound Description (Comments):    Removal Indications:    Wound Image   08/17/23 1459   Dressing Appearance Open to air 08/17/23 1459   Wound Length (cm) 0 cm 08/17/23 1459   Wound Width (cm) 0 cm 08/17/23 1459   Wound Depth (cm) 0 cm 08/17/23 1459   Wound Volume (cm^3) 0 cm^3 08/17/23 1459   Wound Surface Area (cm^2) 0 cm^2 08/17/23 1459   Care Cleansed with:;Soap and water 08/17/23 1459            Altered Skin Integrity 08/17/23 1508 Left anterior Toe, third #7 Blister(s) (Active)   08/17/23 1508   Altered Skin Integrity Present on Admission - Did Patient arrive to the hospital with altered skin?: yes   Side: Left   Orientation: anterior   Location: Toe, third   Wound Number: #7   Is this injury device related?: No   Primary Wound Type: Blister(s)   Description of Altered Skin Integrity:    Ankle-Brachial Index:    Pulses: + doppler, dp = monophasic,   Removal Indication and Assessment:    Wound Outcome:    (Retired) Wound Length (cm):    (Retired) Wound Width (cm):    (Retired) Depth (cm):    Wound Description (Comments):    Removal Indications:    Wound Image   08/17/23 1509   Dressing Appearance Open to air 08/17/23 1509   Drainage Amount None 08/17/23 1509   Appearance Epithelialization 08/17/23 1509   Black (%), Wound Tissue Color 0 % 08/17/23 1509   Red (%), Wound Tissue Color 0 % 08/17/23 1509   Yellow (%), Wound Tissue Color 0 % 08/17/23 1509   Periwound Area Intact;Dry 08/17/23 1509   Wound Length (cm) 0.5 cm 08/17/23 1509   Wound Width (cm) 0.8 cm 08/17/23 1509   Wound Depth (cm) 0 cm 08/17/23 1509   Wound Volume (cm^3) 0 cm^3 08/17/23 1509   Wound Surface Area (cm^2) 0.4 cm^2 08/17/23 1509   Care Cleansed with:;Wound cleanser 08/17/23 1509       [REMOVED]       Altered Skin Integrity 08/03/23 1350 Left medial Arm #3 Skin Tear (Removed)   08/03/23 1350   Altered Skin Integrity Present on Admission - Did Patient arrive to the hospital with altered skin?: yes   Side: Left   Orientation: medial   Location: Arm   Wound Number: #3   Is this injury device related?: Yes   Primary Wound Type: Skin tear   Description of Altered Skin Integrity:    Ankle-Brachial Index:    Pulses:    Removal Indication and Assessment:    Wound Outcome: Healed   (Retired) Wound Length (cm):    (Retired) Wound Width (cm):    (Retired) Depth (cm):    Wound Description (Comments):    Removal Indications:    Removed 08/17/23 0310   Wound Image   08/17/23 1459   Appearance Epithelialization 08/17/23 1459   Wound Length (cm) 0 cm 08/17/23 1459   Wound Width (cm) 0 cm 08/17/23 1459   Wound Depth (cm) 0 cm 08/17/23 1459   Wound Volume (cm^3) 0 cm^3 08/17/23 1459   Wound Surface Area (cm^2) 0 cm^2 08/17/23 1459       [REMOVED]      Altered Skin Integrity 08/03/23 1352 Right lower Leg #4 Venous Ulcer (Removed)   08/03/23 1352   Altered Skin Integrity Present on Admission - Did Patient arrive to the hospital with altered skin?: yes   Side: Right   Orientation: lower   Location: Leg   Wound Number: #4   Is this injury device related?: No   Primary Wound Type: Venous ulcer   Description of Altered Skin Integrity:    Ankle-Brachial Index:    Pulses:    Removal Indication and Assessment:    Wound Outcome: Healed   (Retired) Wound Length (cm):    (Retired) Wound Width (cm):    (Retired) Depth (cm):    Wound Description (Comments):    Removal Indications:    Removed 08/17/23 0310   Wound Image   08/17/23 1459   Appearance Epithelialization 08/17/23 1459   Wound Length (cm) 0 cm 08/17/23 1459   Wound Width (cm) 0 cm 08/17/23 1459   Wound Depth (cm) 0 cm 08/17/23 1459   Wound Volume (cm^3) 0 cm^3 08/17/23 1459   Wound Surface Area (cm^2) 0 cm^2 08/17/23 1459   Care Cleansed with:;Antimicrobial agent 08/17/23 1459    Dressing Applied 08/17/23 1515             Assessment:       1. 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 Chronic venous hypertension with ulcer and inflammation, left    4. Atherosclerosis of left leg    5. Pseudomonas aeruginosa infection    6. Anemia, unspecified type    7. Hypertension, unspecified type          LLE ulcers/chronic since late 2022 per patient: first clinic visit 6/29/23: slow improvement  Anemia with severe GI Bleed early June 2023: treated at [facility]: multiple blood transfusions, upper GI with intervention  Mixed PVD: treated by Dr Higgins: 2022 Lower extremity angiography and venogram because of chronic LLE ulcers; h/o ulcer both legs/ankles in past   Angiogram 2022: PTA of the left tibioperoneal trunk, posterior tibial artery and anterior tibial artery.   Another angiogram Aug 23: need records  Venogram 2022 :right-sided iliofemoral venous compression syndrome noted:  right external iliac vein, common iliac vein PTV and stenting  Anemia with severe GI Bleed early June 2023: treated at [facility]: multiple blood transfusions, upper GI with intervention  Chronic plavix therapy  CAD  Asthma  Hypertension      Plan:     Plan of Care:    Debrided both LLE ulcers  Wound Care Orders: dressings of gentamicin ointment under moistened hydrofera blue; every other day; (has declined HH)  Need procedure note from [facility]   Nutrition: Must have a high protein diet to support wound  healing; (if renal disease, see nephrologist for amount allowed):  this should be over 100g protein /day (if no kidney issues); Also rec MVI along with vit C, vit D, zinc and Uri  Return to clinic 2 weeks

## 2023-08-31 ENCOUNTER — HOSPITAL ENCOUNTER (OUTPATIENT)
Dept: WOUND CARE | Facility: HOSPITAL | Age: 88
Discharge: HOME | End: 2023-08-31
Attending: EMERGENCY MEDICINE
Payer: MEDICARE

## 2023-08-31 VITALS
BODY MASS INDEX: 26.56 KG/M2 | RESPIRATION RATE: 16 BRPM | HEIGHT: 74 IN | SYSTOLIC BLOOD PRESSURE: 128 MMHG | HEART RATE: 94 BPM | WEIGHT: 207 LBS | TEMPERATURE: 98 F | DIASTOLIC BLOOD PRESSURE: 70 MMHG

## 2023-08-31 DIAGNOSIS — L97.222 CHRONIC ULCER OF LEFT CALF WITH FAT LAYER EXPOSED: ICD-10-CM

## 2023-08-31 DIAGNOSIS — A49.8 PSEUDOMONAS AERUGINOSA INFECTION: ICD-10-CM

## 2023-08-31 DIAGNOSIS — Z91.89 AT HIGH RISK FOR PRESSURE INJURY OF SKIN: ICD-10-CM

## 2023-08-31 DIAGNOSIS — I87.332 CHRONIC VENOUS HYPERTENSION WITH ULCER AND INFLAMMATION, LEFT: ICD-10-CM

## 2023-08-31 DIAGNOSIS — L97.922 CHRONIC ULCER OF LEFT LEG WITH FAT LAYER EXPOSED: Primary | ICD-10-CM

## 2023-08-31 DIAGNOSIS — I25.10 CORONARY ARTERIOSCLEROSIS: ICD-10-CM

## 2023-08-31 DIAGNOSIS — D64.9 ANEMIA, UNSPECIFIED TYPE: ICD-10-CM

## 2023-08-31 DIAGNOSIS — I70.202 ATHEROSCLEROSIS OF LEFT LEG: ICD-10-CM

## 2023-08-31 DIAGNOSIS — I10 HYPERTENSION, UNSPECIFIED TYPE: ICD-10-CM

## 2023-08-31 PROCEDURE — 11042 DBRDMT SUBQ TIS 1ST 20SQCM/<: CPT | Mod: ,,, | Performed by: EMERGENCY MEDICINE

## 2023-08-31 PROCEDURE — 11042 DBRDMT SUBQ TIS 1ST 20SQCM/<: CPT

## 2023-08-31 PROCEDURE — 99499 UNLISTED E&M SERVICE: CPT | Mod: ,,, | Performed by: EMERGENCY MEDICINE

## 2023-08-31 PROCEDURE — 99499 NO LOS: ICD-10-PCS | Mod: ,,, | Performed by: EMERGENCY MEDICINE

## 2023-08-31 PROCEDURE — 27000999 HC MEDICAL RECORD PHOTO DOCUMENTATION

## 2023-08-31 PROCEDURE — 11042 DEBRIDEMENT: ICD-10-PCS | Mod: ,,, | Performed by: EMERGENCY MEDICINE

## 2023-08-31 NOTE — PROGRESS NOTES
Subjective:       Patient ID: Donovan Enriquez is a 88 y.o. male.    Chief Complaint: Non-healing Wound Follow Up      CC: LLE ulcers      87 y/o WM referred to this Wound Care Clinic from a rehab facility. This patient has a history of hypertension, neuropathy,mixed PVD with ulcers, lymphedema, CAD, cardiomegaly, asthma on chronic  Plavix who was admitted to [facility] on 6/9/23 for shortness of breath and hypoxia.  He was ultimately diagnosed with a GI bleed and severe anemia.  He was treated with 4 blood transfusions and a GI workup with upper scope and intervention.  He was then transferred to [facility] on 6/12/23 where he stayed though  6/26/23.  It was noted upon arrival to [facility] he had multiple LLE ulcers which had been present all of 2023, maybe longer.. +wound cx for Pseudomonas so was  given cipro 500mg bid on 6/25/23.   Dr Higgins has done both angiograms and venograms in the past.   Search of Epic: note April 2022 by Dr ANKIT Higgins    Angiogram: PTA of the left tibioperoneal trunk, posterior tibial artery and anterior tibial artery.   Venogram :right-sided iliofemoral venous compression syndrome noted:  right external iliac vein, common iliac vein PTV and stenting    I debrided and advised of dressings of gentamicin ointment under moistened hydrofera blue; And referred back to [facility]. Pt declined home health and said his wife would do the dressings  I have been seeing weekly since then and ulcers have improved.  Dr Higgins did do an angiogram or venogram on LLE; pt had reported for some reason Right big toe looked dusky after that (opposite leg) which has since improved. Pt reports today on 8/30/23, he had another angio or veno on RLE but we have no records.        Review of Systems   Constitutional: Negative.    HENT: Negative.     Respiratory: Negative.     Gastrointestinal: Negative.    Neurological: Negative.          Objective:      Vitals:    08/31/23 1356   BP: 128/70   Pulse: 94   Resp:  "16   Temp: 98.1 °F (36.7 °C)     @poctglucose@  No results for input(s): "POCTGLUCOSE" in the last 24 hours.  Physical Exam  Vitals reviewed.   Constitutional:       Comments: Elderly ; no distress   HENT:      Head: Normocephalic and atraumatic.   Cardiovascular:      Pulses:           Dorsalis pedis pulses are 1+ on the right side and 1+ on the left side.   Pulmonary:      Effort: Pulmonary effort is normal.   Musculoskeletal:      Right lower le+ Pitting Edema present.      Left lower le+ Pitting Edema present.        Legs:    Skin:     Capillary Refill: Capillary refill takes less than 2 seconds.   Neurological:      General: No focal deficit present.      Mental Status: He is alert and oriented to person, place, and time. Mental status is at baseline.              Altered Skin Integrity 23 141 Left anterior;lower Leg #1 Venous Ulcer (Active)   23 141   Altered Skin Integrity Present on Admission - Did Patient arrive to the hospital with altered skin?: yes   Side: Left   Orientation: anterior;lower   Location: Leg   Wound Number: #1   Is this injury device related?: No   Primary Wound Type: Venous ulcer   Description of Altered Skin Integrity:    Ankle-Brachial Index: Done 23 R DP: 0.98 PT: 1.04/ L DP: 0.89  PT:  0.85   Pulses: Palpable X2; + Doppler- biphasic   Removal Indication and Assessment:    Wound Outcome:    (Retired) Wound Length (cm):    (Retired) Wound Width (cm):    (Retired) Depth (cm):    Wound Description (Comments):    Removal Indications:    Wound Image   23   Dressing Appearance Intact;Moist drainage 23   Drainage Amount Moderate 23   Drainage Characteristics/Odor Serosanguineous;No odor 23   Appearance Red;White 23   Tissue loss description Full thickness 23 141   Black (%), Wound Tissue Color 0 % 23   Red (%), Wound Tissue Color 90 % 23   Yellow (%), Wound Tissue Color 10 % 23 " 1416   Periwound Area Intact;Dry 08/31/23 1416   Wound Edges Irregular 08/31/23 1416   Wound Length (cm) 1.2 cm 08/31/23 1416   Wound Width (cm) 1.4 cm 08/31/23 1416   Wound Depth (cm) 0.5 cm 08/31/23 1416   Wound Volume (cm^3) 0.84 cm^3 08/31/23 1416   Wound Surface Area (cm^2) 1.68 cm^2 08/31/23 1416   Care Cleansed with:;Soap and water;Applied: 08/31/23 1416   Dressing Applied 08/31/23 1416            Altered Skin Integrity 07/06/23 1417 Left lower;posterior Calf #2 Venous Ulcer (Active)   07/06/23 1417   Altered Skin Integrity Present on Admission - Did Patient arrive to the hospital with altered skin?: yes   Side: Left   Orientation: lower;posterior   Location: Calf   Wound Number: #2   Is this injury device related?: No   Primary Wound Type: Venous ulcer   Description of Altered Skin Integrity:    Ankle-Brachial Index: Done 6/29/23 R DP: 0.98 PT: 1.04/ L DP: 0.89  PT:  0.85   Pulses: palpable x2; + Doppler- biphasic x4   Removal Indication and Assessment:    Wound Outcome:    (Retired) Wound Length (cm):    (Retired) Wound Width (cm):    (Retired) Depth (cm):    Wound Description (Comments):    Removal Indications:    Wound Image   08/31/23 1416   Dressing Appearance Intact;Moist drainage 08/31/23 1416   Drainage Amount Moderate 08/31/23 1416   Drainage Characteristics/Odor Serosanguineous;No odor 08/31/23 1416   Appearance Pink 08/31/23 1416   Tissue loss description Full thickness 08/31/23 1416   Black (%), Wound Tissue Color 0 % 08/31/23 1416   Red (%), Wound Tissue Color 100 % 08/31/23 1416   Yellow (%), Wound Tissue Color 0 % 08/31/23 1416   Periwound Area Intact;Dry 08/31/23 1416   Wound Edges Irregular 08/31/23 1416   Wound Length (cm) 1.1 cm 08/31/23 1416   Wound Width (cm) 0.9 cm 08/31/23 1416   Wound Depth (cm) 0.4 cm 08/31/23 1416   Wound Volume (cm^3) 0.396 cm^3 08/31/23 1416   Wound Surface Area (cm^2) 0.99 cm^2 08/31/23 1416   Care Cleansed with:;Soap and water;Applied: 08/31/23 1416   Dressing  Applied 08/31/23 1416            Altered Skin Integrity 08/11/23 1120 Left dorsal Foot #5 Blister(s) (Active)   08/11/23 1120   Altered Skin Integrity Present on Admission - Did Patient arrive to the hospital with altered skin?: yes   Side: Left   Orientation: dorsal   Location: Foot   Wound Number: #5   Is this injury device related?: No   Primary Wound Type: Blister(s)   Description of Altered Skin Integrity:    Ankle-Brachial Index:    Pulses:    Removal Indication and Assessment:    Wound Outcome:    (Retired) Wound Length (cm):    (Retired) Wound Width (cm):    (Retired) Depth (cm):    Wound Description (Comments):    Removal Indications:    Wound Image   08/31/23 1416   Dressing Appearance Intact;Clean 08/31/23 1416   Drainage Amount None 08/31/23 1416   Drainage Characteristics/Odor No odor 08/31/23 1416   Appearance Pink 08/31/23 1416   Black (%), Wound Tissue Color 0 % 08/31/23 1416   Red (%), Wound Tissue Color 100 % 08/31/23 1416   Yellow (%), Wound Tissue Color 0 % 08/31/23 1416   Periwound Area Intact;Dry 08/31/23 1416   Wound Edges Defined 08/31/23 1416   Wound Length (cm) 0 cm 08/31/23 1416   Wound Width (cm) 0 cm 08/31/23 1416   Wound Depth (cm) 0 cm 08/31/23 1416   Wound Volume (cm^3) 0 cm^3 08/31/23 1416   Wound Surface Area (cm^2) 0 cm^2 08/31/23 1416   Care Cleansed with:;Soap and water;Applied: 08/31/23 1416            Altered Skin Integrity 08/17/23 1508 Left anterior Toe, third #7 Blister(s) (Active)   08/17/23 1508   Altered Skin Integrity Present on Admission - Did Patient arrive to the hospital with altered skin?: yes   Side: Left   Orientation: anterior   Location: Toe, third   Wound Number: #7   Is this injury device related?: No   Primary Wound Type: Blister(s)   Description of Altered Skin Integrity:    Ankle-Brachial Index:    Pulses: + doppler, dp = monophasic,   Removal Indication and Assessment:    Wound Outcome:    (Retired) Wound Length (cm):    (Retired) Wound Width (cm):     (Retired) Depth (cm):    Wound Description (Comments):    Removal Indications:    Wound Image   08/31/23 1416   Dressing Appearance Intact;Moist drainage 08/31/23 1416   Drainage Amount Moderate 08/31/23 1416   Drainage Characteristics/Odor Serosanguineous;No odor 08/31/23 1416   Appearance Pink 08/31/23 1416   Tissue loss description Partial thickness 08/31/23 1416   Black (%), Wound Tissue Color 0 % 08/31/23 1416   Red (%), Wound Tissue Color 100 % 08/31/23 1416   Yellow (%), Wound Tissue Color 0 % 08/31/23 1416   Periwound Area Intact;Dry 08/31/23 1416   Wound Edges Defined 08/31/23 1416   Wound Length (cm) 0.6 cm 08/31/23 1416   Wound Width (cm) 0.9 cm 08/31/23 1416   Wound Depth (cm) 0.1 cm 08/31/23 1416   Wound Volume (cm^3) 0.054 cm^3 08/31/23 1416   Wound Surface Area (cm^2) 0.54 cm^2 08/31/23 1416   Care Cleansed with:;Soap and water;Applied: 08/31/23 1416   Dressing Applied;Bandaid 08/31/23 1416       [REMOVED]      Altered Skin Integrity 08/11/23 1157 Right Toe, first #6 Other (comment) (Removed)   08/11/23 1157   Altered Skin Integrity Present on Admission - Did Patient arrive to the hospital with altered skin?: yes   Side: Right   Orientation:    Location: Toe, first   Wound Number: #6   Is this injury device related?: No   Primary Wound Type: Other   Description of Altered Skin Integrity:    Ankle-Brachial Index:    Pulses:    Removal Indication and Assessment:    Wound Outcome:    (Retired) Wound Length (cm):    (Retired) Wound Width (cm):    (Retired) Depth (cm):    Wound Description (Comments):    Removal Indications:    Removed 08/31/23 1429   Wound Image   08/31/23 1416   Dressing Appearance Open to air 08/31/23 1416   Drainage Amount None 08/31/23 1416   Drainage Characteristics/Odor No odor 08/31/23 1416   Appearance Purple 08/31/23 1416   Black (%), Wound Tissue Color 0 % 08/31/23 1416   Red (%), Wound Tissue Color 100 % 08/31/23 1416   Yellow (%), Wound Tissue Color 0 % 08/31/23 1416    Periwound Area Intact;Dry 08/31/23 1416   Wound Length (cm) 0 cm 08/31/23 1416   Wound Width (cm) 0 cm 08/31/23 1416   Wound Depth (cm) 0 cm 08/31/23 1416   Wound Volume (cm^3) 0 cm^3 08/31/23 1416   Wound Surface Area (cm^2) 0 cm^2 08/31/23 1416   Care Cleansed with:;Soap and water;Applied: 08/31/23 1416             Assessment:       1. 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 Chronic venous hypertension with ulcer and inflammation, left    4. Atherosclerosis of left leg    5. Pseudomonas aeruginosa infection    6. Anemia, unspecified type    7. Hypertension, unspecified type    8. Coronary arteriosclerosis    9. At high risk for pressure injury of skin          LLE ulcers/chronic since late 2022 per patient: first clinic visit 6/29/23: slow improvement  Anemia with severe GI Bleed early June 2023: treated at [facility]: multiple blood transfusions, upper GI with intervention  Mixed PVD: treated by Dr Higgins: 2022 Lower extremity angiography and venogram because of chronic LLE ulcers; h/o ulcer both legs/ankles in past   Angiogram 2022: PTA of the left tibioperoneal trunk, posterior tibial artery and anterior tibial artery.   Another LLE angiogram (or venogram) on 8/23/23: need records  RLE procedure with [facility] end of Aug: need records  Venogram 2022 :right-sided iliofemoral venous compression syndrome noted:  right external iliac vein, common iliac vein PTV and stenting  Anemia with severe GI Bleed early June 2023: treated at [facility]: multiple blood transfusions, upper GI with intervention  Chronic plavix therapy  CAD  Asthma  Hypertension      Plan:     Plan of Care:    Debrided both LLE ulcers  Wound Care Orders: can stop the Gentamicin ointment under moistened hydrofera blue and begin collagen dressings   Nutrition: Must have a high protein diet to support wound  healing; (if renal disease, see nephrologist for amount allowed):  this should be  over 100g protein /day (if no kidney issues); Also rec MVI along with vit C, vit D, zinc and Uri  Compression: per Dr Higgins; need his records  Return to clinic 1 week

## 2023-08-31 NOTE — PROCEDURES
Debridement    Date/Time: 8/31/2023 2:02 PM    Performed by: Liliana Guo MD  Authorized by: Liliana Guo MD  Associated wounds:        Altered Skin Integrity 07/06/23 1416 Left anterior;lower Leg #1 Venous Ulcer       Altered Skin Integrity 07/06/23 1417 Left lower;posterior Calf #2 Venous Ulcer  Consent Done?:  Yes (Written)  Local anesthesia used?: Yes      Wound Details:    Location:  Left leg    Type of Debridement:  Excisional       Length (cm):  1.2       Area (sq cm):  1.68       Width (cm):  1.4       Percent Debrided (%):  100       Depth (cm):  0.5       Total Area Debrided (sq cm):  1.68    Depth of debridement:  Subcutaneous tissue    Tissue debrided:  Epidermis, Dermis and Subcutaneous    Devitalized tissue debrided:  Biofilm and Slough    Instruments:  Curette  Bleeding:  Minimal  Hemostasis Achieved: Yes  Method Used:  Pressure    2nd Wound Details:     Location:  Left leg    Type of Debridement:  Excisional       Length (cm):  1.1       Area (sq cm):  0.99       Width (cm):  0.9       Percent Debrided (%):  100       Depth (cm):  0.4       Total Area Debrided (sq cm):  0.99    Depth of debridement:  Subcutaneous tissue    Tissue debrided:  Dermis, Epidermis and Subcutaneous    Devitalized tissue debrided:  Biofilm and Slough    Instruments:  Curette  Bleeding:  Minimal  Patient tolerance:  Patient tolerated the procedure well with no immediate complications

## 2023-09-07 ENCOUNTER — HOSPITAL ENCOUNTER (OUTPATIENT)
Dept: WOUND CARE | Facility: HOSPITAL | Age: 88
Discharge: HOME | End: 2023-09-07
Attending: EMERGENCY MEDICINE
Payer: MEDICARE

## 2023-09-07 VITALS
HEART RATE: 75 BPM | DIASTOLIC BLOOD PRESSURE: 65 MMHG | BODY MASS INDEX: 27.21 KG/M2 | RESPIRATION RATE: 16 BRPM | HEIGHT: 74 IN | SYSTOLIC BLOOD PRESSURE: 112 MMHG | TEMPERATURE: 98 F | WEIGHT: 212 LBS

## 2023-09-07 DIAGNOSIS — I70.202 ATHEROSCLEROSIS OF LEFT LEG: ICD-10-CM

## 2023-09-07 DIAGNOSIS — L97.222 CHRONIC ULCER OF LEFT CALF WITH FAT LAYER EXPOSED: ICD-10-CM

## 2023-09-07 DIAGNOSIS — I87.332 CHRONIC VENOUS HYPERTENSION WITH ULCER AND INFLAMMATION, LEFT: ICD-10-CM

## 2023-09-07 DIAGNOSIS — I10 HYPERTENSION, UNSPECIFIED TYPE: ICD-10-CM

## 2023-09-07 DIAGNOSIS — L97.922 CHRONIC ULCER OF LEFT LEG WITH FAT LAYER EXPOSED: ICD-10-CM

## 2023-09-07 PROCEDURE — 99499 NO LOS: ICD-10-PCS | Mod: ,,, | Performed by: EMERGENCY MEDICINE

## 2023-09-07 PROCEDURE — 11042 DEBRIDEMENT: ICD-10-PCS | Mod: ,,, | Performed by: EMERGENCY MEDICINE

## 2023-09-07 PROCEDURE — 99499 UNLISTED E&M SERVICE: CPT | Mod: ,,, | Performed by: EMERGENCY MEDICINE

## 2023-09-07 PROCEDURE — 11042 DBRDMT SUBQ TIS 1ST 20SQCM/<: CPT | Mod: ,,, | Performed by: EMERGENCY MEDICINE

## 2023-09-07 NOTE — PATIENT INSTRUCTIONS
Pt seen today by: Dr. Brant MD    Supplies orderd on 6/29/2023 from [facility]; pt received his supplies.    Self care DRESSING INSTRUCTIONS:      Wound location: Left anterior/ Posterior Leg  Cleanse wound with wound cleanser or saline  Apply Promogran to wound bed  Cover with exudry or ABD pad  Secure with kerix and cover-all tape  Dressings to be changed Every Other Day and as needed if soiled or not intact    Return visit:  Thursday, September 21, 2023 at 1:00 pm.    Wound may have been debrided in clinic: if so, WHAT YOU NEED TO KNOW:  Debridement is the removal of infected, damaged, or dead tissue so a wound can heal properly. Your wound may need more than one debridement. Debridement can cause bleeding, and a small amount of blood is expected.  AFTER A DEBRIDEMENT:  Keep your wound clean and dry. Do not remove the dressing unless instructed.  Follow the wound care orders provided to you or your home health care provider.  If you have pain, take over the counter pain relievers or pain medication if prescribed.  Elevate the wound and limit excessive activity to prevent bleeding and/or swelling in your wound.  If you see blood coming through the dressing, apply gauze and tape over the dressing and hold firm pressure to the wound with your hand for 5-10 minutes continuously, without peeking, to help the bleeding stop.    Contact [facility] wound care team at [phone] or go to the nearest Emergency department if:  You have a fever greater than 101 taken by mouth.  Your pain gets worse or does not go away, even after taking your regular pain medicine.  Your skin around your wound is red, hot, swollen, or draining pus.  You have bleeding that continues to come through the dressing after holding pressure for 10 minutes     Nutrition:  The current daily value (%DV) for protein is 50 grams per day and is meant as a general goal for most people. Further increasing your dietary protein intake is very important for wound  healing. Typically one needs over 100g of protein per day to help with wound healing needs.  If you are a dialysis patient or have problems with your kidneys, talk to your Nephrologist about how much protein you can take in with your condition.  Examples of high protein items that can be added to your diet include: eggs, chicken, red meats, almonds, cottage cheese, Greek yogurt, beans, and peanut butter.  Fortified protein bars, shakes and drinks can add 15-30 additional grams of protein per serving.   Also add:   1 daily general multivitamin   Uri : 1 packet twice daily   Vitamin C : 500mg twice daily   Zinc 220 mg daily  Vit D : once daily    Offloading:  Offload your wound. This means to reduce pressure on and around the wound that reduces blood flow to the wound and prevents healing. Your wound care team will discuss specific ways for you to offload your specific wound. Common offloading strategies include:  Turn or reposition every 2 hours or sooner  Use pillows, wedges, ROHO wheelchair cushions or other special devices like boots and shoes to lift the wound off of hard surfaces  Alternating Low Air-loss (ALAL) mattress may be ordered  Padded dressings can reduce wound pressure      Call our [facility] wound clinic for questions/concerns a [phone] .

## 2023-09-07 NOTE — PROGRESS NOTES
Subjective:       Patient ID: Donovan Enriquez is a 88 y.o. male.    Chief Complaint: Non-healing Wound Follow Up      CC: LLE ulcers      87 y/o WM referred to this Wound Care Clinic from a rehab facility. This patient has a history of hypertension, neuropathy,mixed PVD with ulcers, lymphedema, CAD, cardiomegaly, asthma on chronic  Plavix who was admitted to [facility] on 6/9/23 for shortness of breath and hypoxia.  He was ultimately diagnosed with a GI bleed and severe anemia.  He was treated with 4 blood transfusions and a GI workup with upper scope and intervention.  He was then transferred to [facility] on 6/12/23 where he stayed though  6/26/23.  It was noted upon arrival to [facility] he had multiple LLE ulcers which had been present all of 2023, maybe longer.. +wound cx for Pseudomonas so was  given cipro 500mg bid on 6/25/23.   Dr Higgins has done both angiograms and venograms in the past.   Search of Epic: note April 2022 by Dr ANKIT Higgins    Angiogram: PTA of the left tibioperoneal trunk, posterior tibial artery and anterior tibial artery.   Venogram :right-sided iliofemoral venous compression syndrome noted:  right external iliac vein, common iliac vein PTV and stenting    I debrided and advised of dressings of gentamicin ointment under moistened hydrofera blue; And referred back to [facility]. Pt declined home health and said his wife would do the dressings  I have been seeing weekly since then and ulcers have improved.  Dr Higgins did do an angiogram or venogram on LLE; pt had reported for some reason Right big toe looked dusky after that (opposite leg) which has since improved. Pt reports today on 8/30/23, he had another angio or veno on RLE but we have no records.  Began collagen dressings last week        Review of Systems   Constitutional: Negative.    HENT: Negative.     Respiratory: Negative.     Gastrointestinal: Negative.    Neurological: Negative.          Objective:      Vitals:    09/07/23  "1344   BP: 112/65   Pulse: 75   Resp: 16   Temp: 97.9 °F (36.6 °C)     @poctglucose@  No results for input(s): "POCTGLUCOSE" in the last 24 hours.  Physical Exam  Vitals reviewed.   Constitutional:       Comments: Elderly ; no distress   HENT:      Head: Normocephalic and atraumatic.   Cardiovascular:      Pulses:           Dorsalis pedis pulses are 1+ on the right side and 1+ on the left side.   Pulmonary:      Effort: Pulmonary effort is normal.   Musculoskeletal:      Right lower le+ Pitting Edema present.      Left lower le+ Pitting Edema present.        Legs:    Skin:     Capillary Refill: Capillary refill takes less than 2 seconds.   Neurological:      General: No focal deficit present.      Mental Status: He is alert and oriented to person, place, and time. Mental status is at baseline.              Altered Skin Integrity 23 1416 Left anterior;lower Leg #1 Venous Ulcer (Active)   23 1416   Altered Skin Integrity Present on Admission - Did Patient arrive to the hospital with altered skin?: yes   Side: Left   Orientation: anterior;lower   Location: Leg   Wound Number: #1   Is this injury device related?: No   Primary Wound Type: Venous ulcer   Description of Altered Skin Integrity:    Ankle-Brachial Index: Done 23 R DP: 0.98 PT: 1.04/ L DP: 0.89  PT:  0.85   Pulses: Palpable X2; + Doppler- biphasic   Removal Indication and Assessment:    Wound Outcome:    (Retired) Wound Length (cm):    (Retired) Wound Width (cm):    (Retired) Depth (cm):    Wound Description (Comments):    Removal Indications:    Wound Image   23 1400   Dressing Appearance Intact;Moist drainage 23 1400   Drainage Amount Moderate 23 1400   Drainage Characteristics/Odor Serosanguineous;No odor 23 1400   Appearance Pink 23 1400   Tissue loss description Full thickness 23 1400   Black (%), Wound Tissue Color 0 % 23 1400   Red (%), Wound Tissue Color 100 % 23 1400   Yellow " (%), Wound Tissue Color 0 % 09/07/23 1400   Periwound Area Intact;Dry 09/07/23 1400   Wound Edges Irregular 09/07/23 1400   Wound Length (cm) 1.7 cm 09/07/23 1400   Wound Width (cm) 1.1 cm 09/07/23 1400   Wound Depth (cm) 0.3 cm 09/07/23 1400   Wound Volume (cm^3) 0.561 cm^3 09/07/23 1400   Wound Surface Area (cm^2) 1.87 cm^2 09/07/23 1400   Care Cleansed with:;Wound cleanser;Applied: 09/07/23 1400            Altered Skin Integrity 07/06/23 1417 Left lower;posterior Calf #2 Venous Ulcer (Active)   07/06/23 1417   Altered Skin Integrity Present on Admission - Did Patient arrive to the hospital with altered skin?: yes   Side: Left   Orientation: lower;posterior   Location: Calf   Wound Number: #2   Is this injury device related?: No   Primary Wound Type: Venous ulcer   Description of Altered Skin Integrity:    Ankle-Brachial Index: Done 6/29/23 R DP: 0.98 PT: 1.04/ L DP: 0.89  PT:  0.85   Pulses: palpable x2; + Doppler- biphasic x4   Removal Indication and Assessment:    Wound Outcome:    (Retired) Wound Length (cm):    (Retired) Wound Width (cm):    (Retired) Depth (cm):    Wound Description (Comments):    Removal Indications:    Wound Image   09/07/23 1400   Dressing Appearance Intact;Moist drainage 09/07/23 1400   Drainage Amount Moderate 09/07/23 1400   Drainage Characteristics/Odor Serosanguineous;No odor 09/07/23 1400   Appearance Pink 09/07/23 1400   Tissue loss description Full thickness 09/07/23 1400   Black (%), Wound Tissue Color 0 % 09/07/23 1400   Red (%), Wound Tissue Color 100 % 09/07/23 1400   Yellow (%), Wound Tissue Color 0 % 09/07/23 1400   Periwound Area Intact;Dry 09/07/23 1400   Wound Edges Irregular 09/07/23 1400   Wound Length (cm) 0.8 cm 09/07/23 1400   Wound Width (cm) 0.5 cm 09/07/23 1400   Wound Depth (cm) 0.4 cm 09/07/23 1400   Wound Volume (cm^3) 0.16 cm^3 09/07/23 1400   Wound Surface Area (cm^2) 0.4 cm^2 09/07/23 1400   Care Cleansed with:;Wound cleanser;Applied: 09/07/23 1400        [REMOVED]      Altered Skin Integrity 08/11/23 1120 Left dorsal Foot #5 Blister(s) (Removed)   08/11/23 1120   Altered Skin Integrity Present on Admission - Did Patient arrive to the hospital with altered skin?: yes   Side: Left   Orientation: dorsal   Location: Foot   Wound Number: #5   Is this injury device related?: No   Primary Wound Type: Blister(s)   Description of Altered Skin Integrity:    Ankle-Brachial Index:    Pulses:    Removal Indication and Assessment:    Wound Outcome: Healed   (Retired) Wound Length (cm):    (Retired) Wound Width (cm):    (Retired) Depth (cm):    Wound Description (Comments):    Removal Indications:    Removed 09/07/23 1413   Wound Image   09/07/23 1400   Dressing Appearance Open to air 09/07/23 1400   Drainage Amount None 09/07/23 1400   Drainage Characteristics/Odor No odor 09/07/23 1400   Appearance Pink 09/07/23 1400   Black (%), Wound Tissue Color 0 % 09/07/23 1400   Red (%), Wound Tissue Color 100 % 09/07/23 1400   Yellow (%), Wound Tissue Color 0 % 09/07/23 1400   Periwound Area Intact;Dry 09/07/23 1400   Wound Length (cm) 0 cm 09/07/23 1400   Wound Width (cm) 0 cm 09/07/23 1400   Wound Depth (cm) 0 cm 09/07/23 1400   Wound Volume (cm^3) 0 cm^3 09/07/23 1400   Wound Surface Area (cm^2) 0 cm^2 09/07/23 1400   Care Cleansed with:;Wound cleanser 09/07/23 1400       [REMOVED]      Altered Skin Integrity 08/17/23 1508 Left anterior Toe, third #7 Blister(s) (Removed)   08/17/23 1508   Altered Skin Integrity Present on Admission - Did Patient arrive to the hospital with altered skin?: yes   Side: Left   Orientation: anterior   Location: Toe, third   Wound Number: #7   Is this injury device related?: No   Primary Wound Type: Blister(s)   Description of Altered Skin Integrity:    Ankle-Brachial Index:    Pulses: + doppler, dp = monophasic,   Removal Indication and Assessment:    Wound Outcome: Healed   (Retired) Wound Length (cm):    (Retired) Wound Width (cm):    (Retired) Depth  (cm):    Wound Description (Comments):    Removal Indications:    Removed 09/07/23 1413   Wound Image   09/07/23 1400   Dressing Appearance Open to air 09/07/23 1400   Drainage Amount None 09/07/23 1400   Drainage Characteristics/Odor No odor 09/07/23 1400   Appearance Pink 09/07/23 1400   Black (%), Wound Tissue Color 0 % 09/07/23 1400   Red (%), Wound Tissue Color 100 % 09/07/23 1400   Yellow (%), Wound Tissue Color 0 % 09/07/23 1400   Periwound Area Intact;Dry 09/07/23 1400   Wound Length (cm) 0 cm 09/07/23 1400   Wound Width (cm) 0 cm 09/07/23 1400   Wound Depth (cm) 0 cm 09/07/23 1400   Wound Volume (cm^3) 0 cm^3 09/07/23 1400   Wound Surface Area (cm^2) 0 cm^2 09/07/23 1400   Care Cleansed with:;Wound cleanser 09/07/23 1400             Assessment:       1. 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 Chronic venous hypertension with ulcer and inflammation, left    4. Atherosclerosis of left leg    5. Hypertension, unspecified type          LLE ulcers/chronic since late 2022 per patient: first clinic visit 6/29/23: slow improvement  Anemia with severe GI Bleed early June 2023: treated at [facility]: multiple blood transfusions, upper GI with intervention  Mixed PVD: treated by Dr Higgins: 2022 Lower extremity angiography and venogram because of chronic LLE ulcers; h/o ulcer both legs/ankles in past   Angiogram 2022: PTA of the left tibioperoneal trunk, posterior tibial artery and anterior tibial artery.   Another LLE angiogram (or venogram) on 8/23/23: need records  RLE procedure with [facility] end of Aug: need records  Venogram 2022 :right-sided iliofemoral venous compression syndrome noted:  right external iliac vein, common iliac vein PTV and stenting  Anemia with severe GI Bleed early June 2023: treated at [facility]: multiple blood transfusions, upper GI with intervention  Chronic plavix therapy  CAD  Asthma  Hypertension      Plan:     Plan of  Care:    Debrided both LLE ulcers  Wound Care Orders: continue collagen dressings   Nutrition: Must have a high protein diet to support wound  healing; (if renal disease, see nephrologist for amount allowed):  this should be over 100g protein /day (if no kidney issues); Also rec MVI along with vit C, vit D, zinc and Uri  Compression: per Dr Higgins; need his records  Return to clinic 2 weeks

## 2023-09-21 ENCOUNTER — HOSPITAL ENCOUNTER (OUTPATIENT)
Dept: WOUND CARE | Facility: HOSPITAL | Age: 88
Discharge: HOME | End: 2023-09-21
Attending: EMERGENCY MEDICINE
Payer: MEDICARE

## 2023-09-21 VITALS
HEIGHT: 74 IN | TEMPERATURE: 98 F | RESPIRATION RATE: 20 BRPM | WEIGHT: 212 LBS | DIASTOLIC BLOOD PRESSURE: 68 MMHG | HEART RATE: 80 BPM | SYSTOLIC BLOOD PRESSURE: 119 MMHG | BODY MASS INDEX: 27.21 KG/M2

## 2023-09-21 DIAGNOSIS — L97.222 CHRONIC ULCER OF LEFT CALF WITH FAT LAYER EXPOSED: ICD-10-CM

## 2023-09-21 DIAGNOSIS — I10 HYPERTENSION, UNSPECIFIED TYPE: ICD-10-CM

## 2023-09-21 DIAGNOSIS — I25.10 CORONARY ARTERIOSCLEROSIS: ICD-10-CM

## 2023-09-21 DIAGNOSIS — I87.332 CHRONIC VENOUS HYPERTENSION WITH ULCER AND INFLAMMATION, LEFT: ICD-10-CM

## 2023-09-21 DIAGNOSIS — D64.9 ANEMIA, UNSPECIFIED TYPE: ICD-10-CM

## 2023-09-21 DIAGNOSIS — I70.202 ATHEROSCLEROSIS OF LEFT LEG: ICD-10-CM

## 2023-09-21 DIAGNOSIS — A49.8 PSEUDOMONAS AERUGINOSA INFECTION: ICD-10-CM

## 2023-09-21 DIAGNOSIS — L97.922 CHRONIC ULCER OF LEFT LEG WITH FAT LAYER EXPOSED: ICD-10-CM

## 2023-09-21 PROCEDURE — 99499 UNLISTED E&M SERVICE: CPT | Mod: ,,, | Performed by: EMERGENCY MEDICINE

## 2023-09-21 PROCEDURE — 99499 NO LOS: ICD-10-PCS | Mod: ,,, | Performed by: EMERGENCY MEDICINE

## 2023-09-21 PROCEDURE — 27000999 HC MEDICAL RECORD PHOTO DOCUMENTATION

## 2023-09-21 PROCEDURE — 11042 DBRDMT SUBQ TIS 1ST 20SQCM/<: CPT | Mod: ,,, | Performed by: EMERGENCY MEDICINE

## 2023-09-21 PROCEDURE — 11042 DBRDMT SUBQ TIS 1ST 20SQCM/<: CPT

## 2023-09-21 PROCEDURE — 11042 DEBRIDEMENT: ICD-10-PCS | Mod: ,,, | Performed by: EMERGENCY MEDICINE

## 2023-09-21 RX ORDER — CLOPIDOGREL BISULFATE 75 MG/1
75 TABLET ORAL
COMMUNITY
Start: 2023-08-21

## 2023-09-21 RX ORDER — ROSUVASTATIN CALCIUM 20 MG/1
1 TABLET, COATED ORAL NIGHTLY
COMMUNITY
Start: 2023-08-14

## 2023-09-21 NOTE — PROCEDURES
Debridement    Date/Time: 9/21/2023 1:44 PM    Performed by: Liliana Guo MD  Authorized by: Liliana Guo MD  Associated wounds:        Altered Skin Integrity 07/06/23 1416 Left anterior;lower Leg #1 Venous Ulcer       Altered Skin Integrity 07/06/23 1417 Left lower;posterior Calf #2 Venous Ulcer  Consent Done?:  Yes (Written)  Local anesthesia used?: Yes    Local anesthetic:  Topical anesthetic    Wound Details:    Location:  Left leg    Type of Debridement:  Excisional       Length (cm):  1.1       Area (sq cm):  1.1       Width (cm):  1       Percent Debrided (%):  100       Depth (cm):  0.1       Total Area Debrided (sq cm):  1.1    Depth of debridement:  Subcutaneous tissue    Tissue debrided:  Dermis, Epidermis and Subcutaneous    Devitalized tissue debrided:  Biofilm and Slough    Instruments:  Curette  Bleeding:  Minimal    2nd Wound Details:     Location:  Left leg (calf)    Type of Debridement:  Excisional       Length (cm):  0.6       Area (sq cm):  0.36       Width (cm):  0.6       Percent Debrided (%):  100       Depth (cm):  0.1       Total Area Debrided (sq cm):  0.36    Depth of debridement:  Subcutaneous tissue    Tissue debrided:  Dermis, Epidermis and Subcutaneous    Instruments:  Curette  Bleeding:  Minimal  Hemostasis Achieved: Yes    Method Used:  Pressure  Patient tolerance:  Patient tolerated the procedure well with no immediate complications

## 2023-09-21 NOTE — PATIENT INSTRUCTIONS
Pt seen today by: Dr. Brant MD    Supplies orderd on 6/29/2023 from [facility]; pt received his supplies.    Self care DRESSING INSTRUCTIONS:      Wound location: Left anterior/ Posterior Leg  Cleanse wound with wound cleanser or saline  Apply Promogran to wound bed  Cover with ABD pad  Secure with kerlix and secure with tape  Dressings to be changed Every Other Day and as needed if soiled or not intact    Return visit:  Thursday, October 5, 2023 at 1:00 pm.    Wound may have been debrided in clinic: if so, WHAT YOU NEED TO KNOW:  Debridement is the removal of infected, damaged, or dead tissue so a wound can heal properly. Your wound may need more than one debridement. Debridement can cause bleeding, and a small amount of blood is expected.  AFTER A DEBRIDEMENT:  Keep your wound clean and dry. Do not remove the dressing unless instructed.  Follow the wound care orders provided to you or your home health care provider.  If you have pain, take over the counter pain relievers or pain medication if prescribed.  Elevate the wound and limit excessive activity to prevent bleeding and/or swelling in your wound.  If you see blood coming through the dressing, apply gauze and tape over the dressing and hold firm pressure to the wound with your hand for 5-10 minutes continuously, without peeking, to help the bleeding stop.    Contact [facility] wound care team at [phone] or go to the nearest Emergency department if:  You have a fever greater than 101 taken by mouth.  Your pain gets worse or does not go away, even after taking your regular pain medicine.  Your skin around your wound is red, hot, swollen, or draining pus.  You have bleeding that continues to come through the dressing after holding pressure for 10 minutes     Nutrition:  The current daily value (%DV) for protein is 50 grams per day and is meant as a general goal for most people. Further increasing your dietary protein intake is very important for wound healing.  Typically one needs over 100g of protein per day to help with wound healing needs.  If you are a dialysis patient or have problems with your kidneys, talk to your Nephrologist about how much protein you can take in with your condition.  Examples of high protein items that can be added to your diet include: eggs, chicken, red meats, almonds, cottage cheese, Greek yogurt, beans, and peanut butter.  Fortified protein bars, shakes and drinks can add 15-30 additional grams of protein per serving.   Also add:   1 daily general multivitamin   Uri : 1 packet twice daily   Vitamin C : 500mg twice daily   Zinc 220 mg daily  Vit D : once daily    Offloading:  Offload your wound. This means to reduce pressure on and around the wound that reduces blood flow to the wound and prevents healing. Your wound care team will discuss specific ways for you to offload your specific wound. Common offloading strategies include:  Turn or reposition every 2 hours or sooner  Use pillows, wedges, ROHO wheelchair cushions or other special devices like boots and shoes to lift the wound off of hard surfaces  Alternating Low Air-loss (ALAL) mattress may be ordered  Padded dressings can reduce wound pressure      Call our [facility] wound clinic for questions/concerns a [phone] .

## 2023-09-21 NOTE — PROGRESS NOTES
Subjective:       Patient ID: Donovan Enriquez is a 88 y.o. male.    Chief Complaint: Chronic ulcer of left leg with fat layer exposed and Chronic ulcer of left calf with fat layer exposed      CC: LLE ulcers      89 y/o WM referred to this Wound Care Clinic from a rehab facility. This patient has a history of hypertension, neuropathy,mixed PVD with ulcers, lymphedema, CAD, cardiomegaly, asthma on chronic  Plavix who was admitted to [facility] on 6/9/23 for shortness of breath and hypoxia.  He was ultimately diagnosed with a GI bleed and severe anemia.  He was treated with 4 blood transfusions and a GI workup with upper scope and intervention.  He was then transferred to [facility] on 6/12/23 where he stayed though  6/26/23.  It was noted upon arrival to [facility] he had multiple LLE ulcers which had been present all of 2023, maybe longer.. +wound cx for Pseudomonas so was  given cipro 500mg bid on 6/25/23.   Dr Higgins has done both angiograms and venograms in the past.   Search of Epic: note April 2022 by Dr ANKIT Higgins    Angiogram: PTA of the left tibioperoneal trunk, posterior tibial artery and anterior tibial artery.   Venogram :right-sided iliofemoral venous compression syndrome noted:  right external iliac vein, common iliac vein PTV and stenting    I debrided and advised of dressings of gentamicin ointment under moistened hydrofera blue; And referred back to [facility]. Pt declined home health and said his wife would do the dressings  I have been seeing weekly since then and ulcers have improved.  Dr Higgins did do an angiogram or venogram on LLE; pt had reported for some reason Right big toe looked dusky after that (opposite leg) which has since improved. Pt reported on 8/30/23, he had another angio or veno on RLE but we have no records.  He is now using collagen dressings.  Both ulcers in the left leg are demonstrating smaller sizes and active healing.        Review of Systems   Constitutional: Negative.   "  HENT: Negative.     Respiratory: Negative.     Gastrointestinal: Negative.    Neurological: Negative.          Objective:      Vitals:    23 1400   BP: 119/68   Pulse: 80   Resp: 20   Temp: 98 °F (36.7 °C)       @poctglucose@  No results for input(s): "POCTGLUCOSE" in the last 24 hours.  Physical Exam  Vitals reviewed.   Constitutional:       Comments: Elderly ; no distress   HENT:      Head: Normocephalic and atraumatic.   Cardiovascular:      Pulses:           Dorsalis pedis pulses are 1+ on the right side and 1+ on the left side.   Pulmonary:      Effort: Pulmonary effort is normal.   Musculoskeletal:      Right lower le+ Pitting Edema present.      Left lower le+ Pitting Edema present.        Legs:    Skin:     Capillary Refill: Capillary refill takes less than 2 seconds.   Neurological:      General: No focal deficit present.      Mental Status: He is alert and oriented to person, place, and time. Mental status is at baseline.              Altered Skin Integrity 23 1416 Left anterior;lower Leg #1 Venous Ulcer (Active)   23 1416   Altered Skin Integrity Present on Admission - Did Patient arrive to the hospital with altered skin?: yes   Side: Left   Orientation: anterior;lower   Location: Leg   Wound Number: #1   Is this injury device related?: No   Primary Wound Type: Venous ulcer   Description of Altered Skin Integrity:    Ankle-Brachial Index: santa done 23 - Left dp = 1.18, pt = 0.87   Pulses: Palpable X2; + Doppler- biphasic   Removal Indication and Assessment:    Wound Outcome:    (Retired) Wound Length (cm):    (Retired) Wound Width (cm):    (Retired) Depth (cm):    Wound Description (Comments):    Removal Indications:    Wound Image    23 131   Dressing Appearance Intact;Moist drainage 23 131   Drainage Amount Moderate 23 131   Drainage Characteristics/Odor Yellow;No odor 23 131   Appearance Yellow;Pink 23   Tissue loss description Full " thickness 09/21/23 1319   Black (%), Wound Tissue Color 0 % 09/21/23 1319   Red (%), Wound Tissue Color 80 % 09/21/23 1319   Yellow (%), Wound Tissue Color 20 % 09/21/23 1319   Periwound Area Intact;Dry 09/21/23 1319   Wound Edges Defined 09/21/23 1319   Wound Length (cm) 1.1 cm 09/21/23 1319   Wound Width (cm) 1 cm 09/21/23 1319   Wound Depth (cm) 0.1 cm 09/21/23 1319   Wound Volume (cm^3) 0.11 cm^3 09/21/23 1319   Wound Surface Area (cm^2) 1.1 cm^2 09/21/23 1319   Care Cleansed with:;Soap and water;Wound cleanser 09/21/23 1319   Dressing Applied;Collagen;Absorptive Pad;Rolled gauze 09/21/23 1345   Periwound Care Absorptive dressing applied 09/21/23 1345            Altered Skin Integrity 07/06/23 1417 Left lower;posterior Calf #2 Venous Ulcer (Active)   07/06/23 1417   Altered Skin Integrity Present on Admission - Did Patient arrive to the hospital with altered skin?: yes   Side: Left   Orientation: lower;posterior   Location: Calf   Wound Number: #2   Is this injury device related?: No   Primary Wound Type: Venous ulcer   Description of Altered Skin Integrity:    Ankle-Brachial Index: santa done 9/21/23 - Left dp = 1.18, pt = 0.87   Pulses: palpable x2; + Doppler- biphasic x4   Removal Indication and Assessment:    Wound Outcome:    (Retired) Wound Length (cm):    (Retired) Wound Width (cm):    (Retired) Depth (cm):    Wound Description (Comments):    Removal Indications:    Wound Image    09/21/23 1319   Dressing Appearance Intact;Moist drainage 09/21/23 1319   Drainage Amount Moderate 09/21/23 1319   Drainage Characteristics/Odor Yellow;No odor 09/21/23 1319   Appearance Yellow;Pink 09/21/23 1319   Tissue loss description Full thickness 09/21/23 1319   Black (%), Wound Tissue Color 0 % 09/21/23 1319   Red (%), Wound Tissue Color 10 % 09/21/23 1319   Yellow (%), Wound Tissue Color 90 % 09/21/23 1319   Periwound Area Intact;Dry 09/21/23 1319   Wound Edges Defined 09/21/23 1319   Wound Length (cm) 0.6 cm 09/21/23  1319   Wound Width (cm) 0.6 cm 09/21/23 1319   Wound Depth (cm) 0.1 cm 09/21/23 1319   Wound Volume (cm^3) 0.036 cm^3 09/21/23 1319   Wound Surface Area (cm^2) 0.36 cm^2 09/21/23 1319   Care Cleansed with:;Soap and water;Applied: 09/21/23 1319   Dressing Applied;Collagen;Absorptive Pad;Rolled gauze 09/21/23 1345   Periwound Care Absorptive dressing applied 09/21/23 1345             Assessment:       1. 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 Chronic venous hypertension with ulcer and inflammation, left    4. Atherosclerosis of left leg    5. Hypertension, unspecified type    6. Pseudomonas aeruginosa infection    7. Anemia, unspecified type    8. Coronary arteriosclerosis          LLE ulcers/chronic since late 2022 per patient: first clinic visit 6/29/23:  Improving   Anemia with severe GI Bleed early June 2023: treated at [facility]: multiple blood transfusions, upper GI with intervention  Mixed PVD: treated by Dr Higgins: 2022 Lower extremity angiography and venogram because of chronic LLE ulcers; h/o ulcer both legs/ankles in past   Angiogram 2022: PTA of the left tibioperoneal trunk, posterior tibial artery and anterior tibial artery.   Another LLE angiogram (or venogram) on 8/23/23: need records  RLE procedure with Gisela end of Aug: need records  Venogram 2022 :right-sided iliofemoral venous compression syndrome noted:  right external iliac vein, common iliac vein PTV and stenting  Anemia with severe GI Bleed early June 2023: treated at [facility]: multiple blood transfusions, upper GI with intervention  Chronic plavix therapy  CAD  Asthma  Hypertension      Plan:     Plan of Care:    Debrided both LLE ulcers  Wound Care Orders: continue collagen dressings   Nutrition: Must have a high protein diet to support wound  healing; (if renal disease, see nephrologist for amount allowed):  this should be over 100g protein /day (if no kidney issues); Also  rec MVI along with vit C, vit D, zinc and Uri  Compression: per Dr Higgins; need his records  Return to clinic 2 weeks

## 2023-10-05 ENCOUNTER — HOSPITAL ENCOUNTER (OUTPATIENT)
Dept: WOUND CARE | Facility: HOSPITAL | Age: 88
Discharge: HOME | End: 2023-10-05
Attending: EMERGENCY MEDICINE
Payer: MEDICARE

## 2023-10-05 VITALS
TEMPERATURE: 98 F | RESPIRATION RATE: 18 BRPM | HEART RATE: 77 BPM | BODY MASS INDEX: 27.21 KG/M2 | DIASTOLIC BLOOD PRESSURE: 79 MMHG | HEIGHT: 74 IN | WEIGHT: 212 LBS | SYSTOLIC BLOOD PRESSURE: 145 MMHG

## 2023-10-05 DIAGNOSIS — L97.222 CHRONIC ULCER OF LEFT CALF WITH FAT LAYER EXPOSED: ICD-10-CM

## 2023-10-05 DIAGNOSIS — I10 HYPERTENSION, UNSPECIFIED TYPE: ICD-10-CM

## 2023-10-05 DIAGNOSIS — I87.332 CHRONIC VENOUS HYPERTENSION WITH ULCER AND INFLAMMATION, LEFT: ICD-10-CM

## 2023-10-05 DIAGNOSIS — L97.922 CHRONIC ULCER OF LEFT LEG WITH FAT LAYER EXPOSED: Primary | ICD-10-CM

## 2023-10-05 DIAGNOSIS — I70.202 ATHEROSCLEROSIS OF LEFT LEG: ICD-10-CM

## 2023-10-05 PROCEDURE — 99212 OFFICE O/P EST SF 10 MIN: CPT

## 2023-10-05 PROCEDURE — 97597 DBRDMT OPN WND 1ST 20 CM/<: CPT | Mod: ,,, | Performed by: EMERGENCY MEDICINE

## 2023-10-05 PROCEDURE — 97597 DBRDMT OPN WND 1ST 20 CM/<: CPT

## 2023-10-05 PROCEDURE — 27000999 HC MEDICAL RECORD PHOTO DOCUMENTATION

## 2023-10-05 PROCEDURE — 99212 PR OFFICE/OUTPT VISIT, EST, LEVL II, 10-19 MIN: ICD-10-PCS | Mod: 25,,, | Performed by: EMERGENCY MEDICINE

## 2023-10-05 PROCEDURE — 97597 DEBRIDEMENT: ICD-10-PCS | Mod: ,,, | Performed by: EMERGENCY MEDICINE

## 2023-10-05 PROCEDURE — 99212 OFFICE O/P EST SF 10 MIN: CPT | Mod: 25,,, | Performed by: EMERGENCY MEDICINE

## 2023-10-05 NOTE — PROGRESS NOTES
Subjective:       Patient ID: Donovan Enriquez is a 88 y.o. male.    Chief Complaint: No chief complaint on file.      CC: LLE ulcers      87 y/o WM referred to this Wound Care Clinic from a rehab facility. This patient has a history of hypertension, neuropathy,mixed PVD with ulcers, lymphedema, CAD, cardiomegaly, asthma on chronic  Plavix who was admitted to [facility] on 6/9/23 for shortness of breath and hypoxia.  He was ultimately diagnosed with a GI bleed and severe anemia.  He was treated with 4 blood transfusions and a GI workup with upper scope and intervention.  He was then transferred [place] [facility] [place] where he stayed though  6/26/23.  It was noted upon arrival [place] [facility] [place] had multiple LLE ulcers which had been present all of 2023, maybe longer.. +wound cx for Pseudomonas so was  given cipro 500mg bid on 6/25/23.   Dr Higgins has done both angiograms and venograms in the past.   Search of Epic: note April 2022 by Dr ANKIT Higgins    Angiogram: PTA of the left tibioperoneal trunk, posterior tibial artery and anterior tibial artery.   Venogram :right-sided iliofemoral venous compression syndrome noted:  right external iliac vein, common iliac vein PTV and stenting    I debrided and advised of dressings of gentamicin ointment under moistened hydrofera blue; And referred back to Gisela. Pt declined home health and said his wife would do the dressings  I have been seeing weekly since then and ulcers have improved.  Dr Higgins did do an angiogram or venogram on LLE; pt had reported for some reason Right big toe looked dusky after that (opposite leg) which has since improved. Pt reported on 8/30/23, he had another angio or veno on RLE but we have no records.  He is now using collagen dressings.  Comes in today for 2 weeks recheck: still healing/smaller        Review of Systems   Constitutional: Negative.    HENT: Negative.     Respiratory: Negative.     Gastrointestinal: Negative.    Neurological:  "Negative.          Objective:      Vitals:    10/05/23 1305   BP: (!) 145/79   Pulse: 77   Resp: 18   Temp: 97.8 °F (36.6 °C)       @poctglucose@  No results for input(s): "POCTGLUCOSE" in the last 24 hours.  Physical Exam  Vitals reviewed.   Constitutional:       Comments: Elderly ; no distress   Cardiovascular:      Pulses:           Dorsalis pedis pulses are 1+ on the right side and 1+ on the left side.   Pulmonary:      Effort: Pulmonary effort is normal.   Musculoskeletal:      Right lower le+ Pitting Edema present.      Left lower le+ Pitting Edema present.        Legs:    Skin:     General: Skin is warm and dry.      Capillary Refill: Capillary refill takes less than 2 seconds.   Neurological:      General: No focal deficit present.      Mental Status: He is alert and oriented to person, place, and time. Mental status is at baseline.              Altered Skin Integrity 23 1416 Left anterior;lower Leg #1 Venous Ulcer (Active)   23 1416   Altered Skin Integrity Present on Admission - Did Patient arrive to the hospital with altered skin?: yes   Side: Left   Orientation: anterior;lower   Location: Leg   Wound Number: #1   Is this injury device related?: No   Primary Wound Type: Venous ulcer   Description of Altered Skin Integrity:    Ankle-Brachial Index: santa done 23 - Left dp = 1.18, pt = 0.87   Pulses: Palpable X2; + Doppler- biphasic   Removal Indication and Assessment:    Wound Outcome:    (Retired) Wound Length (cm):    (Retired) Wound Width (cm):    (Retired) Depth (cm):    Wound Description (Comments):    Removal Indications:    Wound Image    10/05/23 1317   Description of Altered Skin Integrity Partial thickness tissue loss. Shallow open ulcer with a red or pink wound bed, without slough. Intact or Open/Ruptured Serum-filled blister. 10/05/23 1317   Dressing Appearance Intact;Moist drainage 10/05/23 1317   Drainage Amount Small 10/05/23 1317   Drainage Characteristics/Odor " Yellow;Serosanguineous 10/05/23 1317   Appearance Pink;Yellow;Epithelialization 10/05/23 1317   Tissue loss description Partial thickness 10/05/23 1317   Black (%), Wound Tissue Color 0 % 10/05/23 1317   Red (%), Wound Tissue Color 90 % 10/05/23 1317   Yellow (%), Wound Tissue Color 10 % 10/05/23 1317   Periwound Area Intact 10/05/23 1317   Wound Edges Defined 10/05/23 1317   Wound Length (cm) 0.8 cm 10/05/23 1317   Wound Width (cm) 0.6 cm 10/05/23 1317   Wound Depth (cm) 0.1 cm 10/05/23 1317   Wound Volume (cm^3) 0.048 cm^3 10/05/23 1317   Wound Surface Area (cm^2) 0.48 cm^2 10/05/23 1317   Care Cleansed with:;Wound cleanser;Applied: 10/05/23 1317   Dressing Removed;Changed;Collagen;Silver;Absorptive Pad;Rolled gauze 10/05/23 1317            Altered Skin Integrity 07/06/23 1417 Left lower;posterior Calf #2 Venous Ulcer (Active)   07/06/23 1417   Altered Skin Integrity Present on Admission - Did Patient arrive to the hospital with altered skin?: yes   Side: Left   Orientation: lower;posterior   Location: Calf   Wound Number: #2   Is this injury device related?: No   Primary Wound Type: Venous ulcer   Description of Altered Skin Integrity:    Ankle-Brachial Index: santa done 9/21/23 - Left dp = 1.18, pt = 0.87   Pulses: palpable x2; + Doppler- biphasic x4   Removal Indication and Assessment:    Wound Outcome:    (Retired) Wound Length (cm):    (Retired) Wound Width (cm):    (Retired) Depth (cm):    Wound Description (Comments):    Removal Indications:    Wound Image    10/05/23 1318   Description of Altered Skin Integrity Full thickness tissue loss. Subcutaneous fat may be visible but bone, tendon or muscle are not exposed 10/05/23 1318   Dressing Appearance Intact;Moist drainage 10/05/23 1318   Drainage Amount Moderate 10/05/23 1318   Drainage Characteristics/Odor Yellow;Serosanguineous 10/05/23 1318   Appearance Pink;Yellow 10/05/23 1318   Tissue loss description Full thickness 10/05/23 1318   Black (%), Wound Tissue  Color 0 % 10/05/23 1318   Red (%), Wound Tissue Color 20 % 10/05/23 1318   Yellow (%), Wound Tissue Color 80 % 10/05/23 1318   Periwound Area Intact 10/05/23 1318   Wound Edges Defined 10/05/23 1318   Wound Length (cm) 0.4 cm 10/05/23 1318   Wound Width (cm) 0.4 cm 10/05/23 1318   Wound Depth (cm) 0.3 cm 10/05/23 1318   Wound Volume (cm^3) 0.048 cm^3 10/05/23 1318   Wound Surface Area (cm^2) 0.16 cm^2 10/05/23 1318   Care Cleansed with:;Wound cleanser;Applied: 10/05/23 1318   Dressing Removed;Changed;Collagen;Silver;Absorptive Pad;Rolled gauze 10/05/23 1318             Assessment:       1. 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 Chronic venous hypertension with ulcer and inflammation, left    4. Atherosclerosis of left leg    5. Hypertension, unspecified type          LLE ulcers/chronic since late 2022 per patient: first clinic visit 6/29/23:  Improving   Anemia with severe GI Bleed early June 2023: treated at [facility]: multiple blood transfusions, upper GI with intervention  Mixed PVD: treated by Dr Higgins: 2022 Lower extremity angiography and venogram because of chronic LLE ulcers; h/o ulcer both legs/ankles in past   Angiogram 2022: PTA of the left tibioperoneal trunk, posterior tibial artery and anterior tibial artery.   Another LLE angiogram (or venogram) on 8/23/23: need records  RLE procedure with Gisela end of Aug: need records  Venogram 2022 :right-sided iliofemoral venous compression syndrome noted:  right external iliac vein, common iliac vein PTV and stenting  Anemia with severe GI Bleed early June 2023: treated at [facility]: multiple blood transfusions, upper GI with intervention  Chronic plavix therapy  CAD  Asthma  Hypertension      Plan:     Plan of Care:    Selective debridement of posterior ulcer on left leg  Wound Care Orders: continue collagen dressings   Nutrition: Must have a high protein diet to support wound  healing; (if renal  disease, see nephrologist for amount allowed):  this should be over 100g protein /day (if no kidney issues); Also rec MVI along with vit C, vit D, zinc and Uri  Compression: per Dr Higgins; need his records  Return to clinic 2 weeks          The time spent including preparing to see the patient, obtaining patient history and assessment, evaluation of the plan of care, patient/caregiver counseling and education, orders, documentation, coordination of care, and other professional medical management activities for today's encounter was 15 minutes.  Time spent performing procedures during today's encounter was 3 minutes.

## 2023-10-05 NOTE — PATIENT INSTRUCTIONS
Pt seen today by: Dr. Brant MD    Supplies orderd on 6/29/2023 from [facility]; pt received his supplies.    Self care DRESSING INSTRUCTIONS:      Wound location: Left anterior/ Posterior Leg  Cleanse wound with wound cleanser or saline  Apply Promogran to wound bed  Cover with ABD pad  Secure with kerlix and secure with tape  Dressings to be changed Every Other Day and as needed if soiled or not intact    Return visit:  Thursday, October 19, 2023 at 1:00 pm.    Wound may have been debrided in clinic: if so, WHAT YOU NEED TO KNOW:  Debridement is the removal of infected, damaged, or dead tissue so a wound can heal properly. Your wound may need more than one debridement. Debridement can cause bleeding, and a small amount of blood is expected.  AFTER A DEBRIDEMENT:  Keep your wound clean and dry. Do not remove the dressing unless instructed.  Follow the wound care orders provided to you or your home health care provider.  If you have pain, take over the counter pain relievers or pain medication if prescribed.  Elevate the wound and limit excessive activity to prevent bleeding and/or swelling in your wound.  If you see blood coming through the dressing, apply gauze and tape over the dressing and hold firm pressure to the wound with your hand for 5-10 minutes continuously, without peeking, to help the bleeding stop.    Contact [facility] wound care team at [phone] or go to the nearest Emergency department if:  You have a fever greater than 101 taken by mouth.  Your pain gets worse or does not go away, even after taking your regular pain medicine.  Your skin around your wound is red, hot, swollen, or draining pus.  You have bleeding that continues to come through the dressing after holding pressure for 10 minutes     Nutrition:  The current daily value (%DV) for protein is 50 grams per day and is meant as a general goal for most people. Further increasing your dietary protein intake is very important for wound healing.  Typically one needs over 100g of protein per day to help with wound healing needs.  If you are a dialysis patient or have problems with your kidneys, talk to your Nephrologist about how much protein you can take in with your condition.  Examples of high protein items that can be added to your diet include: eggs, chicken, red meats, almonds, cottage cheese, Greek yogurt, beans, and peanut butter.  Fortified protein bars, shakes and drinks can add 15-30 additional grams of protein per serving.   Also add:   1 daily general multivitamin   Uri : 1 packet twice daily   Vitamin C : 500mg twice daily   Zinc 220 mg daily  Vit D : once daily    Offloading:  Offload your wound. This means to reduce pressure on and around the wound that reduces blood flow to the wound and prevents healing. Your wound care team will discuss specific ways for you to offload your specific wound. Common offloading strategies include:  Turn or reposition every 2 hours or sooner  Use pillows, wedges, ROHO wheelchair cushions or other special devices like boots and shoes to lift the wound off of hard surfaces  Alternating Low Air-loss (ALAL) mattress may be ordered  Padded dressings can reduce wound pressure      Call our [facility] wound clinic for questions/concerns a [phone] .

## 2023-10-06 NOTE — PROCEDURES
Debridement    Date/Time: 10/5/2023 1:00 PM    Performed by: Liliana Guo MD  Authorized by: Liliana Guo MD  Associated wounds:        Altered Skin Integrity 07/06/23 1417 Left lower;posterior Calf #2 Venous Ulcer  Consent Done?:  Yes (Written)  Local anesthesia used?: Yes    Local anesthetic:  Topical anesthetic    Wound Details:    Location:  Right leg    Type of Debridement:  Excisional       Length (cm):  0.4       Area (sq cm):  0.16       Width (cm):  0.4       Percent Debrided (%):  100       Depth (cm):  0.3       Total Area Debrided (sq cm):  0.16    Depth of debridement:  Epidermis/Dermis    Tissue debrided:  Dermis and Epidermis    Devitalized tissue debrided:  Biofilm and Slough    Instruments:  Curette  Bleeding:  None  Patient tolerance:  Patient tolerated the procedure well with no immediate complications

## 2023-10-19 ENCOUNTER — HOSPITAL ENCOUNTER (OUTPATIENT)
Dept: WOUND CARE | Facility: HOSPITAL | Age: 88
Discharge: HOME | End: 2023-10-19
Attending: EMERGENCY MEDICINE
Payer: MEDICARE

## 2023-10-19 VITALS
WEIGHT: 212 LBS | HEIGHT: 74 IN | SYSTOLIC BLOOD PRESSURE: 131 MMHG | RESPIRATION RATE: 20 BRPM | DIASTOLIC BLOOD PRESSURE: 63 MMHG | TEMPERATURE: 98 F | BODY MASS INDEX: 27.21 KG/M2 | HEART RATE: 77 BPM

## 2023-10-19 DIAGNOSIS — I10 HYPERTENSION, UNSPECIFIED TYPE: ICD-10-CM

## 2023-10-19 DIAGNOSIS — I87.332 CHRONIC VENOUS HYPERTENSION WITH ULCER AND INFLAMMATION, LEFT: ICD-10-CM

## 2023-10-19 DIAGNOSIS — I70.202 ATHEROSCLEROSIS OF LEFT LEG: ICD-10-CM

## 2023-10-19 DIAGNOSIS — L97.222 CHRONIC ULCER OF LEFT CALF WITH FAT LAYER EXPOSED: ICD-10-CM

## 2023-10-19 DIAGNOSIS — L97.922 CHRONIC ULCER OF LEFT LEG WITH FAT LAYER EXPOSED: ICD-10-CM

## 2023-10-19 PROCEDURE — 99213 OFFICE O/P EST LOW 20 MIN: CPT | Mod: ,,, | Performed by: EMERGENCY MEDICINE

## 2023-10-19 PROCEDURE — 99213 PR OFFICE/OUTPT VISIT, EST, LEVL III, 20-29 MIN: ICD-10-PCS | Mod: ,,, | Performed by: EMERGENCY MEDICINE

## 2023-10-19 PROCEDURE — 27000999 HC MEDICAL RECORD PHOTO DOCUMENTATION

## 2023-10-19 PROCEDURE — 99213 OFFICE O/P EST LOW 20 MIN: CPT

## 2023-10-19 NOTE — PATIENT INSTRUCTIONS
Pt seen today by: Dr. Brant MD    Supplies orderd on 6/29/2023 from [facility]; pt received his supplies.    Self care DRESSING INSTRUCTIONS:      Wound location: Left anterior/ Posterior Leg  Cleanse wound with wound cleanser or saline  Apply  foam bordered dressing (leave in place for 3-4 days or until it begins to come off)  Change dressings to be changed Every 3-4 days and as needed if soiled or not intact  Once you remove the foam dressing ok to put a bandaid over area for protection    Return visit:  Thursday, November 2, 2023 at 1:30 pm.    Call our [facility] wound clinic for questions/concerns a [phone]    Wound may have been debrided in clinic: if so, WHAT YOU NEED TO KNOW:  Debridement is the removal of infected, damaged, or dead tissue so a wound can heal properly. Your wound may need more than one debridement. Debridement can cause bleeding, and a small amount of blood is expected.  AFTER A DEBRIDEMENT:  Keep your wound clean and dry. Do not remove the dressing unless instructed.  Follow the wound care orders provided to you or your home health care provider.  If you have pain, take over the counter pain relievers or pain medication if prescribed.  Elevate the wound and limit excessive activity to prevent bleeding and/or swelling in your wound.  If you see blood coming through the dressing, apply gauze and tape over the dressing and hold firm pressure to the wound with your hand for 5-10 minutes continuously, without peeking, to help the bleeding stop.    Contact [facility] wound care team at [phone] or go to the nearest Emergency department if:  You have a fever greater than 101 taken by mouth.  Your pain gets worse or does not go away, even after taking your regular pain medicine.  Your skin around your wound is red, hot, swollen, or draining pus.  You have bleeding that continues to come through the dressing after holding pressure for 10 minutes     Nutrition:  The current daily value (%DV) for  protein is 50 grams per day and is meant as a general goal for most people. Further increasing your dietary protein intake is very important for wound healing. Typically one needs over 100g of protein per day to help with wound healing needs.  If you are a dialysis patient or have problems with your kidneys, talk to your Nephrologist about how much protein you can take in with your condition.  Examples of high protein items that can be added to your diet include: eggs, chicken, red meats, almonds, cottage cheese, Greek yogurt, beans, and peanut butter.  Fortified protein bars, shakes and drinks can add 15-30 additional grams of protein per serving.   Also add:   1 daily general multivitamin   Uri : 1 packet twice daily   Vitamin C : 500mg twice daily   Zinc 220 mg daily  Vit D : once daily

## 2023-10-19 NOTE — PROGRESS NOTES
Subjective:       Patient ID: Donovan Enriquez is a 88 y.o. male.    Chief Complaint: Chronic ulcer of left leg with fat layer exposed      CC: LLE ulcers      89 y/o WM referred to this Wound Care Clinic from a rehab facility. This patient has a history of hypertension, neuropathy,mixed PVD with ulcers, lymphedema, CAD, cardiomegaly, asthma on chronic  Plavix who was admitted to [facility] on 6/9/23 for shortness of breath and hypoxia.  He was ultimately diagnosed with a GI bleed and severe anemia.  He was treated with 4 blood transfusions and a GI workup with upper scope and intervention.  He was then transferred to [facility] [place] on 6/12/23 where he stayed though  6/26/23.  It was noted upon arrival to [facility] he had multiple LLE ulcers which had been present all of 2023, maybe longer.. +wound cx for Pseudomonas so was  given cipro 500mg bid on 6/25/23.   Dr Higgins has done both angiograms and venograms in the past.   Search of Epic: note April 2022 by Dr ANKIT Higgins    Angiogram: PTA of the left tibioperoneal trunk, posterior tibial artery and anterior tibial artery.   Venogram :right-sided iliofemoral venous compression syndrome noted:  right external iliac vein, common iliac vein PTV and stenting    I debrided and advised of dressings of gentamicin ointment under moistened hydrofera blue; And referred back to [facility]. Pt declined home health and said his wife would do the dressings  I have been seeing weekly since then and ulcers have improved.  Dr Higgins did do an angiogram or venogram on LLE; pt had reported for some reason Right big toe looked dusky after that (opposite leg) which has since improved. Pt reported on 8/30/23, he had another angio or veno on RLE but we have no records.  He is now using collagen dressings.  Comes in today for 2 weeks recheck: crusty covering but seems healing/healed underneath each one        Review of Systems   Constitutional: Negative.    HENT: Negative.     Respiratory:  "Negative.     Gastrointestinal: Negative.    Neurological: Negative.          Objective:      Vitals:    10/19/23 1305   BP: 131/63   Pulse: 77   Resp: 20   Temp: 97.9 °F (36.6 °C)       @poctglucose@  No results for input(s): "POCTGLUCOSE" in the last 24 hours.  Physical Exam  Vitals reviewed.   Constitutional:       Comments: Elderly ; no distress   Cardiovascular:      Pulses:           Dorsalis pedis pulses are 1+ on the right side and 1+ on the left side.   Pulmonary:      Effort: Pulmonary effort is normal.   Musculoskeletal:      Right lower le+ Pitting Edema present.      Left lower le+ Pitting Edema present.        Legs:    Skin:     General: Skin is warm and dry.      Capillary Refill: Capillary refill takes less than 2 seconds.   Neurological:      General: No focal deficit present.      Mental Status: He is alert and oriented to person, place, and time. Mental status is at baseline.              Altered Skin Integrity 23 1416 Left anterior;lower Leg #1 Venous Ulcer (Active)   23 141   Altered Skin Integrity Present on Admission - Did Patient arrive to the hospital with altered skin?: yes   Side: Left   Orientation: anterior;lower   Location: Leg   Wound Number: #1   Is this injury device related?: No   Primary Wound Type: Venous ulcer   Description of Altered Skin Integrity:    Ankle-Brachial Index: santa done 10/19/23 - Left dp = 1.04, pt = 0.96   Pulses: Palpable X2; + Doppler = biphasic   Removal Indication and Assessment:    Wound Outcome:    (Retired) Wound Length (cm):    (Retired) Wound Width (cm):    (Retired) Depth (cm):    Wound Description (Comments):    Removal Indications:    Wound Image    10/19/23 1314   Dressing Appearance Intact;Moist drainage 10/19/23 1314   Drainage Amount Moderate 10/19/23 1314   Drainage Characteristics/Odor Yellow;No odor 10/19/23 1314   Appearance Yellow;Eschar;Pink 10/19/23 1314   Tissue loss description Partial thickness 10/19/23 1314   Black " (%), Wound Tissue Color 0 % 10/19/23 1314   Red (%), Wound Tissue Color 25 % 10/19/23 1314   Yellow (%), Wound Tissue Color 75 % 10/19/23 1314   Periwound Area Intact;Dry 10/19/23 1314   Wound Edges Undefined 10/19/23 1314   Wound Length (cm) 2 cm 10/19/23 1314   Wound Width (cm) 1.7 cm 10/19/23 1314   Wound Depth (cm) 0.1 cm 10/19/23 1314   Wound Volume (cm^3) 0.34 cm^3 10/19/23 1314   Wound Surface Area (cm^2) 3.4 cm^2 10/19/23 1314   Care Cleansed with:;Soap and water;Wound cleanser;Applied: 10/19/23 1314   Dressing Foam 10/19/23 1330            Altered Skin Integrity 07/06/23 1417 Left lower;posterior Calf #2 Venous Ulcer (Active)   07/06/23 1417   Altered Skin Integrity Present on Admission - Did Patient arrive to the hospital with altered skin?: yes   Side: Left   Orientation: lower;posterior   Location: Calf   Wound Number: #2   Is this injury device related?: No   Primary Wound Type: Venous ulcer   Description of Altered Skin Integrity:    Ankle-Brachial Index: santa done 10/19/23 - Left dp = 1.04, pt = 0.96   Pulses: palpable x2; + Doppler- biphasic x4   Removal Indication and Assessment:    Wound Outcome:    (Retired) Wound Length (cm):    (Retired) Wound Width (cm):    (Retired) Depth (cm):    Wound Description (Comments):    Removal Indications:    Wound Image    10/19/23 1314   Dressing Appearance Intact;Dried drainage 10/19/23 1314   Drainage Amount Moderate 10/19/23 1314   Drainage Characteristics/Odor Yellow;No odor 10/19/23 1314   Appearance Yellow;Slough 10/19/23 1314   Tissue loss description Not applicable 10/19/23 1314   Black (%), Wound Tissue Color 0 % 10/19/23 1314   Red (%), Wound Tissue Color 0 % 10/19/23 1314   Yellow (%), Wound Tissue Color 100 % 10/19/23 1314   Periwound Area Intact;Dry 10/19/23 1314   Wound Edges Defined 10/19/23 1314   Wound Length (cm) 0.7 cm 10/19/23 1314   Wound Width (cm) 0.7 cm 10/19/23 1314   Wound Depth (cm) 0 cm 10/19/23 1314   Wound Volume (cm^3) 0 cm^3 10/19/23  1314   Wound Surface Area (cm^2) 0.49 cm^2 10/19/23 1314   Care Cleansed with:;Soap and water;Wound cleanser;Applied: 10/19/23 1314   Dressing Foam 10/19/23 1330             Assessment:       1. 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 Chronic venous hypertension with ulcer and inflammation, left    4. Atherosclerosis of left leg    5. Hypertension, unspecified type          LLE ulcers/chronic since late 2022 per patient: first clinic visit 6/29/23: healing   Anemia with severe GI Bleed early June 2023: treated at [facility]: multiple blood transfusions, upper GI with intervention  Mixed PVD: treated by Dr Higgins: 2022 Lower extremity angiography and venogram because of chronic LLE ulcers; h/o ulcer both legs/ankles in past   Angiogram 2022: PTA of the left tibioperoneal trunk, posterior tibial artery and anterior tibial artery.   Another LLE angiogram (or venogram) on 8/23/23: need records  RLE procedure with Gisela end of Aug: need records  Venogram 2022 :right-sided iliofemoral venous compression syndrome noted:  right external iliac vein, common iliac vein PTV and stenting  Anemia with severe GI Bleed early June 2023: treated at [facility]: multiple blood transfusions, upper GI with intervention  Chronic plavix therapy  CAD  Asthma  Hypertension      Plan:     Plan of Care:    Let's try to prevent the crusty covering: stop collagen; just keep covered with bandaid or similar   Nutrition: Must have a high protein diet to support wound  healing; (if renal disease, see nephrologist for amount allowed):  this should be over 100g protein /day (if no kidney issues); Also rec MVI along with vit C, vit D, zinc and Uri  Compression: per Dr Higgins; need his records  Return to clinic 2 weeks          The time spent including preparing to see the patient, obtaining patient history and assessment, evaluation of the plan of care, patient/caregiver counseling and  education, orders, documentation, coordination of care, and other professional medical management activities for today's encounter was 20 minutes.

## 2023-11-02 ENCOUNTER — HOSPITAL ENCOUNTER (OUTPATIENT)
Dept: WOUND CARE | Facility: HOSPITAL | Age: 88
Discharge: HOME | End: 2023-11-02
Attending: EMERGENCY MEDICINE
Payer: MEDICARE

## 2023-11-02 VITALS
DIASTOLIC BLOOD PRESSURE: 71 MMHG | BODY MASS INDEX: 27.21 KG/M2 | RESPIRATION RATE: 18 BRPM | HEIGHT: 74 IN | WEIGHT: 212 LBS | TEMPERATURE: 98 F | SYSTOLIC BLOOD PRESSURE: 123 MMHG | HEART RATE: 76 BPM

## 2023-11-02 DIAGNOSIS — L97.922 CHRONIC ULCER OF LEFT LEG WITH FAT LAYER EXPOSED: Primary | ICD-10-CM

## 2023-11-02 DIAGNOSIS — L97.222 CHRONIC ULCER OF LEFT CALF WITH FAT LAYER EXPOSED: ICD-10-CM

## 2023-11-02 DIAGNOSIS — I87.332 CHRONIC VENOUS HYPERTENSION WITH ULCER AND INFLAMMATION, LEFT: ICD-10-CM

## 2023-11-02 DIAGNOSIS — I70.202 ATHEROSCLEROSIS OF LEFT LEG: ICD-10-CM

## 2023-11-02 PROCEDURE — 97597 DEBRIDEMENT: ICD-10-PCS | Mod: ,,, | Performed by: EMERGENCY MEDICINE

## 2023-11-02 PROCEDURE — 97597 DBRDMT OPN WND 1ST 20 CM/<: CPT | Mod: ,,, | Performed by: EMERGENCY MEDICINE

## 2023-11-02 PROCEDURE — 97597 DBRDMT OPN WND 1ST 20 CM/<: CPT

## 2023-11-02 PROCEDURE — 27000999 HC MEDICAL RECORD PHOTO DOCUMENTATION

## 2023-11-02 PROCEDURE — 99212 OFFICE O/P EST SF 10 MIN: CPT | Mod: 25,,, | Performed by: EMERGENCY MEDICINE

## 2023-11-02 PROCEDURE — 99212 PR OFFICE/OUTPT VISIT, EST, LEVL II, 10-19 MIN: ICD-10-PCS | Mod: 25,,, | Performed by: EMERGENCY MEDICINE

## 2023-11-02 RX ORDER — MUPIROCIN 20 MG/G
OINTMENT TOPICAL DAILY
Qty: 15 G | Refills: 0 | Status: SHIPPED | OUTPATIENT
Start: 2023-11-02 | End: 2023-11-12

## 2023-11-02 NOTE — PROCEDURES
Debridement    Date/Time: 11/2/2023 1:30 PM    Performed by: Liliana Guo MD  Authorized by: Liliana Guo MD  Associated wounds:        Altered Skin Integrity 07/06/23 1416 Left anterior;lower Leg #1 Venous Ulcer  Consent Done?:  Yes (Written)  Local anesthesia used?: Yes    Local anesthetic:  Topical anesthetic    Wound Details:    Location:  Left leg    Type of Debridement:  Excisional       Length (cm):  1.8       Area (sq cm):  1.8       Width (cm):  1       Percent Debrided (%):  100       Depth (cm):  0       Total Area Debrided (sq cm):  1.8    Depth of debridement:  Epidermis/Dermis    Tissue debrided:  Dermis and Epidermis    Devitalized tissue debrided:  Other    Instruments:  Curette  Bleeding:  None  Patient tolerance:  Patient tolerated the procedure well with no immediate complications

## 2023-11-02 NOTE — PATIENT INSTRUCTIONS
Pt seen today by: Dr. Brant MD    Supplies orderd on 6/29/2023 from [facility]; pt received his supplies.    Self care DRESSING INSTRUCTIONS:      Wound location: Left anterior  Cleanse wound with wound cleanser or saline  Apply  mupirocin oint to the wound under a bandaid  Change dressings every other day and as needed if soiled or not intact    Return visit:  Thursday, November 16, 2023 at 1:30 pm.    Call our [facility] wound clinic for questions/concerns a [phone]    Wound may have been debrided in clinic: if so, WHAT YOU NEED TO KNOW:  Debridement is the removal of infected, damaged, or dead tissue so a wound can heal properly. Your wound may need more than one debridement. Debridement can cause bleeding, and a small amount of blood is expected.  AFTER A DEBRIDEMENT:  Keep your wound clean and dry. Do not remove the dressing unless instructed.  Follow the wound care orders provided to you or your home health care provider.  If you have pain, take over the counter pain relievers or pain medication if prescribed.  Elevate the wound and limit excessive activity to prevent bleeding and/or swelling in your wound.  If you see blood coming through the dressing, apply gauze and tape over the dressing and hold firm pressure to the wound with your hand for 5-10 minutes continuously, without peeking, to help the bleeding stop.    Contact [facility] wound care team at [phone] or go to the nearest Emergency department if:  You have a fever greater than 101 taken by mouth.  Your pain gets worse or does not go away, even after taking your regular pain medicine.  Your skin around your wound is red, hot, swollen, or draining pus.  You have bleeding that continues to come through the dressing after holding pressure for 10 minutes     Nutrition:  The current daily value (%DV) for protein is 50 grams per day and is meant as a general goal for most people. Further increasing your dietary protein intake is very important for wound  healing. Typically one needs over 100g of protein per day to help with wound healing needs.  If you are a dialysis patient or have problems with your kidneys, talk to your Nephrologist about how much protein you can take in with your condition.  Examples of high protein items that can be added to your diet include: eggs, chicken, red meats, almonds, cottage cheese, Greek yogurt, beans, and peanut butter.  Fortified protein bars, shakes and drinks can add 15-30 additional grams of protein per serving.   Also add:   1 daily general multivitamin   Uri : 1 packet twice daily   Vitamin C : 500mg twice daily   Zinc 220 mg daily  Vit D : once daily

## 2023-11-02 NOTE — PROGRESS NOTES
Subjective:       Patient ID: Donovan Enriquez is a 88 y.o. male.    Chief Complaint: No chief complaint on file.      CC: LLE ulcers      89 y/o WM referred to this Wound Care Clinic from a rehab facility. This patient has a history of hypertension, neuropathy,mixed PVD with ulcers, lymphedema, CAD, cardiomegaly, asthma on chronic  Plavix who was admitted to [facility] on 6/9/23 for shortness of breath and hypoxia.  He was ultimately diagnosed with a GI bleed and severe anemia.  He was treated with 4 blood transfusions and a GI workup with upper scope and intervention.  He was then transferred to [facility]/ [facility] on 6/12/23 where he stayed though  6/26/23.  It was noted upon arrival to [facility] he had multiple LLE ulcers which had been present all of 2023, maybe longer.. +wound cx for Pseudomonas so was  given cipro 500mg bid on 6/25/23.   Dr Higgins has done both angiograms and venograms in the past.   Search of Epic: note April 2022 by Dr ANKIT Higgins    Angiogram: PTA of the left tibioperoneal trunk, posterior tibial artery and anterior tibial artery.   Venogram :right-sided iliofemoral venous compression syndrome noted:  right external iliac vein, common iliac vein PTV and stenting    I debrided and advised of dressings of gentamicin ointment under moistened hydrofera blue; And referred back to [facility]. Pt declined home health and said his wife would do the dressings  I have been seeing weekly since then and ulcers have improved.  Dr Higgins did do an angiogram or venogram on LLE; pt had reported for some reason Right big toe looked dusky after that (opposite leg) which has since improved. Pt reported on 8/30/23, he had another angio or veno on RLE but we have no records.  On last visit 2 weeks ago, I stopped the collagen dressing and had him only cover it so it would not dry out and crust over each time.  He presents today on/ 11/2/23:  Calf ulcer has healed but the anterior side has crusted over again.  "        Review of Systems   Constitutional: Negative.    HENT: Negative.     Respiratory: Negative.     Gastrointestinal: Negative.    Neurological: Negative.          Objective:      Vitals:    23 1336   BP: 123/71   Pulse: 76   Resp: 18   Temp: 97.9 °F (36.6 °C)       @poctglucose@  No results for input(s): "POCTGLUCOSE" in the last 24 hours.  Physical Exam  Vitals reviewed.   Constitutional:       Comments: Elderly ; no distress   Cardiovascular:      Pulses:           Dorsalis pedis pulses are 1+ on the right side and 1+ on the left side.   Pulmonary:      Effort: Pulmonary effort is normal.   Musculoskeletal:      Right lower le+ Pitting Edema present.      Left lower le+ Pitting Edema present.        Legs:    Skin:     General: Skin is warm and dry.      Capillary Refill: Capillary refill takes less than 2 seconds.   Neurological:      General: No focal deficit present.      Mental Status: He is alert and oriented to person, place, and time. Mental status is at baseline.              Altered Skin Integrity 23 1416 Left anterior;lower Leg #1 Venous Ulcer (Active)   23 1416   Altered Skin Integrity Present on Admission - Did Patient arrive to the hospital with altered skin?: yes   Side: Left   Orientation: anterior;lower   Location: Leg   Wound Number: #1   Is this injury device related?: No   Primary Wound Type: Venous ulcer   Description of Altered Skin Integrity:    Ankle-Brachial Index: santa done 10/19/23 - Left dp = 1.04, pt = 0.96   Pulses: Palpable X2; + Doppler = biphasic   Removal Indication and Assessment:    Wound Outcome:    (Retired) Wound Length (cm):    (Retired) Wound Width (cm):    (Retired) Depth (cm):    Wound Description (Comments):    Removal Indications:    Wound Image    23   Description of Altered Skin Integrity Full thickness tissue loss. Base is covered by slough and/or eschar in the wound bed 23   Dressing Appearance Intact 23 "   Drainage Amount None 11/02/23 1343   Appearance Yellow;Eschar 11/02/23 1343   Tissue loss description Full thickness 11/02/23 1343   Black (%), Wound Tissue Color 0 % 11/02/23 1343   Red (%), Wound Tissue Color 0 % 11/02/23 1343   Yellow (%), Wound Tissue Color 100 % 11/02/23 1343   Periwound Area Dry 11/02/23 1343   Wound Edges Undefined 11/02/23 1343   Wound Length (cm) 1.8 cm 11/02/23 1343   Wound Width (cm) 1 cm 11/02/23 1343   Wound Depth (cm) 0 cm 11/02/23 1343   Wound Volume (cm^3) 0 cm^3 11/02/23 1343   Wound Surface Area (cm^2) 1.8 cm^2 11/02/23 1343   Care Cleansed with:;Wound cleanser;Applied: 11/02/23 1343   Dressing Applied;Bandaid 11/02/23 1343            Altered Skin Integrity 07/06/23 1417 Left lower;posterior Calf #2 Venous Ulcer (Active)   07/06/23 1417   Altered Skin Integrity Present on Admission - Did Patient arrive to the hospital with altered skin?: yes   Side: Left   Orientation: lower;posterior   Location: Calf   Wound Number: #2   Is this injury device related?: No   Primary Wound Type: Venous ulcer   Description of Altered Skin Integrity:    Ankle-Brachial Index: santa done 10/19/23 - Left dp = 1.04, pt = 0.96   Pulses: palpable x2; + Doppler- biphasic x4   Removal Indication and Assessment:    Wound Outcome:    (Retired) Wound Length (cm):    (Retired) Wound Width (cm):    (Retired) Depth (cm):    Wound Description (Comments):    Removal Indications:    Wound Image   11/02/23 1344   Description of Altered Skin Integrity Intact skin with non-blanchable redness of localized area 11/02/23 1344   Dressing Appearance Intact 11/02/23 1344   Drainage Amount None 11/02/23 1344   Appearance Epithelialization 11/02/23 1344   Tissue loss description Not applicable 11/02/23 1344   Black (%), Wound Tissue Color 0 % 11/02/23 1344   Red (%), Wound Tissue Color 0 % 11/02/23 1344   Yellow (%), Wound Tissue Color 0 % 11/02/23 1344   Periwound Area Intact 11/02/23 1344   Wound Length (cm) 0 cm 11/02/23 1344    Wound Width (cm) 0 cm 11/02/23 1344   Wound Depth (cm) 0 cm 11/02/23 1344   Wound Volume (cm^3) 0 cm^3 11/02/23 1344   Wound Surface Area (cm^2) 0 cm^2 11/02/23 1344   Care Cleansed with:;Wound cleanser 11/02/23 1344   Dressing Applied;Bandaid 11/02/23 1344             Assessment:       1. 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 Chronic venous hypertension with ulcer and inflammation, left    4. Atherosclerosis of left leg          LLE ulcers/chronic since late 2022 per patient: first clinic visit 6/29/23: healing   Anemia with severe GI Bleed early June 2023: treated at [facility]: multiple blood transfusions, upper GI with intervention  Mixed PVD: treated by Dr Higgins: 2022 Lower extremity angiography and venogram because of chronic LLE ulcers; h/o ulcer both legs/ankles in past   Angiogram 2022: PTA of the left tibioperoneal trunk, posterior tibial artery and anterior tibial artery.   Another LLE angiogram (or venogram) on 8/23/23: need records  RLE procedure with [facility] end of Aug: need records  Venogram 2022 :right-sided iliofemoral venous compression syndrome noted:  right external iliac vein, common iliac vein PTV and stenting  Anemia with severe GI Bleed early June 2023: treated at [facility]: multiple blood transfusions, upper GI with intervention  Chronic plavix therapy  CAD  Asthma  Hypertension      Plan:     Plan of Care:    Let's try to prevent the crusty covering:  I stopped the collagen.  It worked for the posterior ulcer but not the anterior one so now I will add topical mupirocin ointment.  And have him put this under a Band-Aid   Nutrition: Must have a high protein diet to support wound  healing; (if renal disease, see nephrologist for amount allowed):  this should be over 100g protein /day (if no kidney issues); Also rec MVI along with vit C, vit D, zinc and Uri  Compression: per Dr Higgins; need his records  Return to clinic 2  weeks          The time spent including preparing to see the patient, obtaining patient history and assessment, evaluation of the plan of care, patient/caregiver counseling and education, orders, documentation, coordination of care, and other professional medical management activities for today's encounter was 15 minutes.    Time spent performing procedures during today's encounter was 5 minutes.

## 2023-11-16 ENCOUNTER — HOSPITAL ENCOUNTER (OUTPATIENT)
Dept: WOUND CARE | Facility: HOSPITAL | Age: 88
Discharge: HOME | End: 2023-11-16
Attending: EMERGENCY MEDICINE
Payer: MEDICARE

## 2023-11-16 VITALS
DIASTOLIC BLOOD PRESSURE: 61 MMHG | BODY MASS INDEX: 27.21 KG/M2 | SYSTOLIC BLOOD PRESSURE: 121 MMHG | HEART RATE: 65 BPM | WEIGHT: 212 LBS | TEMPERATURE: 98 F | HEIGHT: 74 IN | RESPIRATION RATE: 18 BRPM

## 2023-11-16 DIAGNOSIS — L97.222 CHRONIC ULCER OF LEFT CALF WITH FAT LAYER EXPOSED: ICD-10-CM

## 2023-11-16 DIAGNOSIS — I87.332 CHRONIC VENOUS HYPERTENSION WITH ULCER AND INFLAMMATION, LEFT: ICD-10-CM

## 2023-11-16 DIAGNOSIS — I70.202 ATHEROSCLEROSIS OF LEFT LEG: ICD-10-CM

## 2023-11-16 DIAGNOSIS — L97.922 CHRONIC ULCER OF LEFT LEG WITH FAT LAYER EXPOSED: Primary | ICD-10-CM

## 2023-11-16 PROCEDURE — 99212 OFFICE O/P EST SF 10 MIN: CPT | Mod: ,,, | Performed by: EMERGENCY MEDICINE

## 2023-11-16 PROCEDURE — 99212 PR OFFICE/OUTPT VISIT, EST, LEVL II, 10-19 MIN: ICD-10-PCS | Mod: ,,, | Performed by: EMERGENCY MEDICINE

## 2023-11-16 PROCEDURE — 27000999 HC MEDICAL RECORD PHOTO DOCUMENTATION

## 2023-11-16 PROCEDURE — 99212 OFFICE O/P EST SF 10 MIN: CPT

## 2023-11-16 NOTE — PATIENT INSTRUCTIONS
Pt seen today by: Dr. Brant MD    Supplies orderd on 6/29/2023 from [facility]; pt received his supplies.    Self care DRESSING INSTRUCTIONS:      Wound location: Left anterior  Cleanse wound with wound cleanser or saline  Apply a bandaid   Change every 3 day for another couple of weeks    Return visit:  Give us a call if you need to return for any open wounds at [phone]    Call our [facility] wound clinic for questions/concerns a [phone]    Wound may have been debrided in clinic: if so, WHAT YOU NEED TO KNOW:  Debridement is the removal of infected, damaged, or dead tissue so a wound can heal properly. Your wound may need more than one debridement. Debridement can cause bleeding, and a small amount of blood is expected.  AFTER A DEBRIDEMENT:  Keep your wound clean and dry. Do not remove the dressing unless instructed.  Follow the wound care orders provided to you or your home health care provider.  If you have pain, take over the counter pain relievers or pain medication if prescribed.  Elevate the wound and limit excessive activity to prevent bleeding and/or swelling in your wound.  If you see blood coming through the dressing, apply gauze and tape over the dressing and hold firm pressure to the wound with your hand for 5-10 minutes continuously, without peeking, to help the bleeding stop.    Contact [facility] wound care team at [phone] or go to the nearest Emergency department if:  You have a fever greater than 101 taken by mouth.  Your pain gets worse or does not go away, even after taking your regular pain medicine.  Your skin around your wound is red, hot, swollen, or draining pus.  You have bleeding that continues to come through the dressing after holding pressure for 10 minutes     Nutrition:  The current daily value (%DV) for protein is 50 grams per day and is meant as a general goal for most people. Further increasing your dietary protein intake is very important for wound healing. Typically one needs  over 100g of protein per day to help with wound healing needs.  If you are a dialysis patient or have problems with your kidneys, talk to your Nephrologist about how much protein you can take in with your condition.  Examples of high protein items that can be added to your diet include: eggs, chicken, red meats, almonds, cottage cheese, Greek yogurt, beans, and peanut butter.  Fortified protein bars, shakes and drinks can add 15-30 additional grams of protein per serving.   Also add:   1 daily general multivitamin   Uri : 1 packet twice daily   Vitamin C : 500mg twice daily   Zinc 220 mg daily  Vit D : once daily

## 2024-01-05 ENCOUNTER — HOSPITAL ENCOUNTER (OUTPATIENT)
Dept: WOUND CARE | Facility: HOSPITAL | Age: 89
Discharge: HOME | End: 2024-01-05
Attending: EMERGENCY MEDICINE
Payer: MEDICARE

## 2024-01-05 VITALS
TEMPERATURE: 98 F | DIASTOLIC BLOOD PRESSURE: 64 MMHG | HEART RATE: 74 BPM | SYSTOLIC BLOOD PRESSURE: 115 MMHG | RESPIRATION RATE: 16 BRPM

## 2024-01-05 DIAGNOSIS — I87.311 IDIOPATHIC CHRONIC VENOUS HYPERTENSION OF RIGHT LEG WITH ULCER: ICD-10-CM

## 2024-01-05 DIAGNOSIS — Z79.01 CHRONIC ANTICOAGULATION: ICD-10-CM

## 2024-01-05 DIAGNOSIS — L97.919 IDIOPATHIC CHRONIC VENOUS HYPERTENSION OF RIGHT LEG WITH ULCER: ICD-10-CM

## 2024-01-05 DIAGNOSIS — I70.201 ATHEROSCLEROSIS OF RIGHT LEG: ICD-10-CM

## 2024-01-05 DIAGNOSIS — I10 HYPERTENSION, UNSPECIFIED TYPE: ICD-10-CM

## 2024-01-05 DIAGNOSIS — L97.911 ULCER OF RIGHT LOWER EXTREMITY, LIMITED TO BREAKDOWN OF SKIN: ICD-10-CM

## 2024-01-05 DIAGNOSIS — I25.10 CORONARY ARTERIOSCLEROSIS: ICD-10-CM

## 2024-01-05 DIAGNOSIS — I70.202 ATHEROSCLEROSIS OF LEFT LEG: ICD-10-CM

## 2024-01-05 DIAGNOSIS — D64.9 ANEMIA, UNSPECIFIED TYPE: ICD-10-CM

## 2024-01-05 DIAGNOSIS — I87.332 CHRONIC VENOUS HYPERTENSION WITH ULCER AND INFLAMMATION, LEFT: ICD-10-CM

## 2024-01-05 DIAGNOSIS — L97.922 CHRONIC ULCER OF LEFT LEG WITH FAT LAYER EXPOSED: ICD-10-CM

## 2024-01-05 PROBLEM — A49.8 PSEUDOMONAS AERUGINOSA INFECTION: Status: RESOLVED | Noted: 2023-07-13 | Resolved: 2024-01-05

## 2024-01-05 PROBLEM — L97.222: Status: RESOLVED | Noted: 2023-07-13 | Resolved: 2024-01-05

## 2024-01-05 PROCEDURE — 10160 PNXR ASPIR ABSC HMTMA BULLA: CPT | Mod: 59

## 2024-01-05 PROCEDURE — 11042 DBRDMT SUBQ TIS 1ST 20SQCM/<: CPT

## 2024-01-05 PROCEDURE — 11042 DBRDMT SUBQ TIS 1ST 20SQCM/<: CPT | Mod: ,,, | Performed by: EMERGENCY MEDICINE

## 2024-01-05 PROCEDURE — 10160 PNXR ASPIR ABSC HMTMA BULLA: CPT | Mod: 51,,, | Performed by: EMERGENCY MEDICINE

## 2024-01-05 PROCEDURE — 27000999 HC MEDICAL RECORD PHOTO DOCUMENTATION

## 2024-01-05 PROCEDURE — 99214 OFFICE O/P EST MOD 30 MIN: CPT | Mod: 25,,, | Performed by: EMERGENCY MEDICINE

## 2024-01-05 RX ORDER — DUPILUMAB 300 MG/2ML
INJECTION, SOLUTION SUBCUTANEOUS
COMMUNITY

## 2024-01-05 RX ORDER — ALBUTEROL SULFATE 0.83 MG/ML
SOLUTION RESPIRATORY (INHALATION)
COMMUNITY
Start: 2023-12-08

## 2024-01-05 RX ORDER — FLUCONAZOLE 150 MG/1
TABLET ORAL
COMMUNITY

## 2024-01-05 RX ORDER — BUDESONIDE 0.5 MG/2ML
INHALANT ORAL
COMMUNITY
Start: 2023-12-08

## 2024-01-05 NOTE — PROGRESS NOTES
[facility]    Subjective:     Patient ID: Donovan Enriquez is a 88 y.o. male.    Chief Complaint: Wound care    Return of prior patient    CC: blister RLE and another LLE ulcer        89 y/o WM  with hypertension, neuropathy, mixed PVD with recurrent ulcers, lymphedema, CAD, cardiomegaly, asthma on chronic  Plavix , prior GI bleeds, and anemia who I initially met back in  2023 when referred after a rehab stay to help treat a mulitude of LLE stasis ulcers.  His vascular treatment is per Dr Darrian Higgins who has done angiograms and venograms . When I treated him, he had a distal LLE anterior leg ulcer and a left calf ulcer.  Both Healed and he was discharged in early 2023.  He called to return because of a blister noted on right anterior shin for past week. He says he does wear compression stockings per Dr Higgins office.   No fever/chills  He also has a new ulcer on distal left leg on anterior surface as well just distal to where prior ulcer was located        Review of Systems   Constitutional: Negative.    HENT: Negative.     Respiratory: Negative.     Cardiovascular: Negative.    Gastrointestinal: Negative.    Musculoskeletal: Negative.    Skin:  Positive for wound.   Neurological: Negative.        Objective:     Visit Vitals  /64   Pulse 74   Temp 98 °F (36.7 °C)   Resp 16       Physical Exam  Vitals reviewed.   Constitutional:       Comments: Elderly WM   Cardiovascular:      Pulses:           Dorsalis pedis pulses are detected w/ Doppler on the right side and detected w/ Doppler on the left side.      Comments: No ischemic changes to foot  Pulmonary:      Effort: Pulmonary effort is normal.   Musculoskeletal:      Right lower le+ Edema present.      Left lower le+ Edema present.        Legs:    Skin:     General: Skin is warm and dry.      Capillary Refill: Capillary refill takes less than 2 seconds.   Neurological:      General: No focal deficit present.       Mental Status: He is alert and oriented to person, place, and time. Mental status is at baseline.              Altered Skin Integrity 01/05/24 1121 Right anterior;lower Leg Blister(s) (Active)   01/05/24 1121   Altered Skin Integrity Present on Admission - Did Patient arrive to the hospital with altered skin?:    Side: Right   Orientation: anterior;lower   Location: Leg   Wound Number:    Is this injury device related?: No   Primary Wound Type: Blister(s)   Description of Altered Skin Integrity:    Ankle-Brachial Index: 1.09   Pulses: biphasic   Removal Indication and Assessment:    Wound Outcome:    (Retired) Wound Length (cm):    (Retired) Wound Width (cm):    (Retired) Depth (cm):    Wound Description (Comments):    Removal Indications:    Wound Image   01/05/24 1122   Dressing Appearance No dressing 01/05/24 1122   Drainage Amount Moderate 01/05/24 1122   Drainage Characteristics/Odor Serosanguineous 01/05/24 1122   Appearance Pink 01/05/24 1122   Tissue loss description Full thickness 01/05/24 1122   Periwound Area Intact;Dry 01/05/24 1122   Wound Length (cm) 5 cm 01/05/24 1122   Wound Width (cm) 3.5 cm 01/05/24 1122   Wound Depth (cm) 0.1 cm 01/05/24 1122   Wound Volume (cm^3) 1.75 cm^3 01/05/24 1122   Wound Surface Area (cm^2) 17.5 cm^2 01/05/24 1122   Care Cleansed with:;Antimicrobial agent;Soap and water 01/05/24 1122   Dressing Applied 01/05/24 1122   Periwound Care Absorptive dressing applied 01/05/24 1122            Altered Skin Integrity 01/05/24 1122 Right dorsal Foot Traumatic (Active)   01/05/24 1122   Altered Skin Integrity Present on Admission - Did Patient arrive to the hospital with altered skin?: yes   Side: Right   Orientation: dorsal   Location: Foot   Wound Number:    Is this injury device related?: No   Primary Wound Type: Traumatic   Description of Altered Skin Integrity:    Ankle-Brachial Index:    Pulses:    Removal Indication and Assessment:    Wound Outcome:    (Retired) Wound Length  (cm):    (Retired) Wound Width (cm):    (Retired) Depth (cm):    Wound Description (Comments):    Removal Indications:    Wound Image   01/05/24 1123   Description of Altered Skin Integrity Full thickness tissue loss. Subcutaneous fat may be visible but bone, tendon or muscle are not exposed 01/05/24 1123   Dressing Appearance No dressing 01/05/24 1123   Drainage Amount Moderate 01/05/24 1123   Drainage Characteristics/Odor Serosanguineous 01/05/24 1123   Appearance Red 01/05/24 1123   Tissue loss description Full thickness 01/05/24 1123   Periwound Area Intact;Dry 01/05/24 1123   Wound Edges Defined 01/05/24 1123   Wound Length (cm) 1 cm 01/05/24 1123   Wound Width (cm) 1 cm 01/05/24 1123   Wound Depth (cm) 0.1 cm 01/05/24 1123   Wound Volume (cm^3) 0.1 cm^3 01/05/24 1123   Wound Surface Area (cm^2) 1 cm^2 01/05/24 1123   Care Cleansed with:;Antimicrobial agent;Soap and water 01/05/24 1123   Dressing Applied 01/05/24 1123   Periwound Care Absorptive dressing applied 01/05/24 1123            Altered Skin Integrity 01/05/24 1123 Left distal;lower Leg Venous Ulcer (Active)   01/05/24 1123   Altered Skin Integrity Present on Admission - Did Patient arrive to the hospital with altered skin?: yes   Side: Left   Orientation: distal;lower   Location: Leg   Wound Number:    Is this injury device related?: No   Primary Wound Type: Venous ulcer   Description of Altered Skin Integrity:    Ankle-Brachial Index:    Pulses:    Removal Indication and Assessment: not present upon hospital arrival   Wound Outcome:    (Retired) Wound Length (cm):    (Retired) Wound Width (cm):    (Retired) Depth (cm):    Wound Description (Comments):    Removal Indications:    Wound Image   01/05/24 1124   Description of Altered Skin Integrity Full thickness tissue loss. Subcutaneous fat may be visible but bone, tendon or muscle are not exposed 01/05/24 1124   Dressing Appearance No dressing 01/05/24 1124   Drainage Amount Moderate 01/05/24 1124  "  Drainage Characteristics/Odor Serosanguineous 01/05/24 1124   Appearance Pink 01/05/24 1124   Tissue loss description Full thickness 01/05/24 1124   Periwound Area Intact;Dry 01/05/24 1124   Wound Edges Defined 01/05/24 1124   Wound Length (cm) 0.5 cm 01/05/24 1124   Wound Width (cm) 0.3 cm 01/05/24 1124   Wound Depth (cm) 0.2 cm 01/05/24 1124   Wound Volume (cm^3) 0.03 cm^3 01/05/24 1124   Wound Surface Area (cm^2) 0.15 cm^2 01/05/24 1124   Care Cleansed with:;Soap and water;Antimicrobial agent 01/05/24 1124   Dressing Applied 01/05/24 1124   Periwound Care Absorptive dressing applied 01/05/24 1124       Labs Reviewed:     Chemistry:  Lab Results   Component Value Date    BUN 20 06/09/2023    BUN 71 (H) 06/06/2023    CREATININE 0.68 06/09/2023    CREATININE 0.98 06/06/2023    GLUCOSE 95 09/04/2020    AST 30 09/04/2020    AST 35 (H) 09/03/2020    ALT 27 09/04/2020    ALT 27 09/03/2020    HGBA1C 5.2 09/02/2020    HGBA1C 5.3 09/02/2020        Hematology:  Lab Results   Component Value Date    WBC 7.9 09/04/2020    WBC 8.1 09/03/2020    HGB 9.4 (L) 06/09/2023    HGB 7.9 (L) 06/08/2023    HCT 27.8 (L) 06/09/2023    HCT 24.3 (L) 06/08/2023     09/04/2020     09/03/2020       Inflammatory Markers:  No results found for: "HSCRP", "SEDRATE"     hba1c  Lab Results   Component Value Date    HGBA1C 5.2 09/02/2020    HGBA1C 5.3 09/02/2020         Assessment and Plan:     1. Chronic ulcer of left leg with fat layer exposed  Overview:  Recurrent LLE ulcers: anterior and posterior: began late 2022 per patient:h/o +psudomonas treated in this clinic late June -November 2023 ; return to clinic on 1/5/24 for a new ulcer distal left leg near ankle (different than prior one)      2. Atherosclerosis of left leg  Overview:  April 2022 by Dr ANKIT Higgins; Angiogram: PTA of the left tibioperoneal trunk, posterior tibial artery and anterior tibial artery.       3. Ulcer of right lower extremity, limited to breakdown of " skin  Overview:  New blister: return to clinic on 1/5/24      4. Idiopathic chronic venous hypertension of right leg with ulcer  Overview:  2022: Venogram :right-sided iliofemoral venous compression syndrome noted:  right external iliac vein, common iliac vein PTV and stenting      5. Atherosclerosis of right leg  Overview:  RLE angiogram with Dr Higgins Aug 2023: no records    6. Hypertension, unspecified type    7. Anemia, unspecified type  Overview:  With h/o GI bleed, June 2023      8. Coronary arteriosclerosis    9. Chronic anticoagulation  Overview:  Plavix        10. Chronic venous hypertension with ulcer and inflammation, left          Plan of Care:    Pt returned today for a large bulla to RLE that developed in the past week. We also noted a skin tear to dorsal right foot and another small stasis ulcer to distal LLE  I&d performed on the RLE bulla and debrided LLE ulcer  Wound Care Orders: see nurses d/c orders  Nutrition: Must have a high protein diet to support wound  healing; (if renal disease, see nephrologist for amount allowed):  this should be over 100g protein /day (if no kidney issues); Also rec MVI along with vit C, vit D, zinc and Uri  Compression: per G[facility]  Return to clinic 1 week    The time spent including preparing to see the patient, obtaining patient history and assessment, evaluation of the plan of care, patient/caregiver counseling and education, orders, documentation, coordination of care, and other professional medical management activities for today's encounter was 30 minute.    Time spent performing procedures during today's encounter was 10 minute.    Liliana Guo MD

## 2024-01-05 NOTE — PROCEDURES
Debridement    Date/Time: 1/5/2024 10:55 AM    Performed by: Liliana Guo MD  Authorized by: Liliana Guo MD  Associated wounds:        Altered Skin Integrity 01/05/24 1123 Left distal;lower Leg Venous Ulcer  Consent Done?:  Yes (Written)  Local anesthesia used?: Yes    Local anesthetic:  Topical anesthetic    Wound Details:    Location:  Left leg    Type of Debridement:  Excisional       Length (cm):  0.5       Area (sq cm):  0.15       Width (cm):  0.3       Percent Debrided (%):  100       Depth (cm):  0.2       Total Area Debrided (sq cm):  0.15    Depth of debridement:  Subcutaneous tissue    Tissue debrided:  Dermis, Epidermis and Subcutaneous    Devitalized tissue debrided:  Biofilm and Slough    Instruments:  Curette  Bleeding:  Minimal  Hemostasis Achieved: Yes  Method Used:  Pressure  Patient tolerance:  Patient tolerated the procedure well with no immediate complications

## 2024-01-05 NOTE — ASSESSMENT & PLAN NOTE
April 2022 by Dr ANKIT Higgins     Angiogram: PTA of the left tibioperoneal trunk, posterior tibial artery and anterior tibial artery.   Venogram :right-sided iliofemoral venous compression syndrome noted:  right external iliac vein, common iliac vein PTV and stenting

## 2024-01-05 NOTE — PROCEDURES
Incision and Drainage    Date/Time: 1/5/2024 10:55 AM  Location procedure was performed: [facility] OUTPATIENT WOUND CARE    Performed by: Liliana Guo MD  Authorized by: Liliana Guo MD  Type: bulla  Body area: lower extremity  Location details: right leg    Anesthesia:  Local Anesthetic: topical anesthetic    Patient sedated: no  Description of findings: used needle to puncture the bulla   Complexity: simple  Drainage: serous  Drainage amount: moderate  Wound treatment: incision and drainage  Comments: Wiped off the epidermal covering after bulla punctured and drained the fluid

## 2024-01-05 NOTE — PATIENT INSTRUCTIONS
Pt seen today by: Dr. Guo    self care DRESSING INSTRUCTIONS:    Wound location: Right Shin and Right Dorsal Foot  Dressings to be changed every other day and as needed if soiled or not intact.  Patient will be seen in this clinic on Thursdays  Patient and/or family may be asked to assist on other days.      Cleanse wound with wound cleanser or saline  Cover with bordered foam dressings    Left Lower Leg  Cleanse wound with wound cleanser or saline   Apply silver collagen to wound bed  Cover with bordered foam     Return visit: 01/11/2024 at 2:00 pm    Wound may have been debrided in clinic: if so, WHAT YOU NEED TO KNOW:  Debridement is the removal of infected, damaged, or dead tissue so a wound can heal properly. Your wound may need more than one debridement. Debridement can cause bleeding, and a small amount of blood is expected.    AFTER A DEBRIDEMENT:  Keep your wound clean and dry. Do not remove the dressing unless instructed.  2. Follow the wound care orders provided to you or your home health care provider.  3. If you have pain, take over the counter pain relievers or pain medication if prescribed.  4. Elevate the wound and limit excessive activity to prevent bleeding and/or swelling in your wound.  If you see blood coming through the dressing, apply gauze and tape over the dressing and hold firm pressure to the wound with your hand for 5-10 minutes continuously, without peeking, to help the bleeding stop.  Contact [facility] wound care team at [phone] or go to the nearest Emergency department if:  You have a fever greater than 101 taken by mouth.  Your pain gets worse or does not go away, even after taking your regular pain medicine.  Your skin around your wound is red, hot, swollen, or draining pus.  You have bleeding that continues to come through the dressing after holding pressure for 10 minutes     Compression: You may be using a compressive type of dressings to control edema: If so, keep your  compression wrap or tubigrip in place. Do not get them wet, they should feel snug. If they feel tight, or cause pain in any way,  elevate your legs above your heart for 15 minutes. If the wraps still cause pain or you can not tolerate compression,  please remove and notify the clinic or your home health.     Nutrition:  The current daily value (%DV) for protein is 50 grams per day and is meant as a general goal for most people. Further increasing your dietary protein intake is very important for wound healing. Typically one needs over 100g of protein per day to help with wound healing needs.  If you are a dialysis patient or have problems with your kidneys, talk to your Nephrologist about how much protein you can take in with your condition.  Examples of high protein items that can be added to your diet include: eggs, chicken, red meats, almonds, cottage cheese, Greek yogurt, beans, and peanut butter.  Fortified protein bars, shakes and drinks can add 15-30 additional grams of protein per serving.   Also add:   1 daily general multivitamin   Uri : 1 packet twice daily   Vitamin C : 500mg twice daily   Zinc 220 mg daily  Vit D : once daily    Offloading   Offload your wound. This means to reduce pressure on and around the wound that reduces blood flow to the wound and prevents healing. Your wound care team will discuss specific ways for you to offload your specific wound. Common offloading strategies include:  Turn or reposition every 2 hours or sooner  Use pillows, wedges, ROHO wheelchair cushions or other special devices like boots and shoes to lift the wound off of hard surfaces  Alternating Low Air-loss (ALAL) mattress may be ordered  Padded dressings can reduce wound pressure      Call our [facility] wound clinic for questions/concerns a [phone] .

## 2024-01-11 ENCOUNTER — HOSPITAL ENCOUNTER (OUTPATIENT)
Dept: WOUND CARE | Facility: HOSPITAL | Age: 89
Discharge: HOME | End: 2024-01-11
Attending: EMERGENCY MEDICINE
Payer: MEDICARE

## 2024-01-11 VITALS
TEMPERATURE: 98 F | RESPIRATION RATE: 16 BRPM | WEIGHT: 199 LBS | HEART RATE: 73 BPM | DIASTOLIC BLOOD PRESSURE: 69 MMHG | HEIGHT: 74 IN | BODY MASS INDEX: 25.54 KG/M2 | SYSTOLIC BLOOD PRESSURE: 121 MMHG

## 2024-01-11 DIAGNOSIS — L97.919 IDIOPATHIC CHRONIC VENOUS HYPERTENSION OF RIGHT LEG WITH ULCER: ICD-10-CM

## 2024-01-11 DIAGNOSIS — I87.311 IDIOPATHIC CHRONIC VENOUS HYPERTENSION OF RIGHT LEG WITH ULCER: ICD-10-CM

## 2024-01-11 DIAGNOSIS — L97.922 CHRONIC ULCER OF LEFT LEG WITH FAT LAYER EXPOSED: Primary | ICD-10-CM

## 2024-01-11 DIAGNOSIS — D64.9 ANEMIA, UNSPECIFIED TYPE: ICD-10-CM

## 2024-01-11 DIAGNOSIS — L97.911 ULCER OF RIGHT LOWER EXTREMITY, LIMITED TO BREAKDOWN OF SKIN: ICD-10-CM

## 2024-01-11 DIAGNOSIS — I70.202 ATHEROSCLEROSIS OF LEFT LEG: ICD-10-CM

## 2024-01-11 DIAGNOSIS — I87.332 CHRONIC VENOUS HYPERTENSION WITH ULCER AND INFLAMMATION, LEFT: ICD-10-CM

## 2024-01-11 DIAGNOSIS — I10 HYPERTENSION, UNSPECIFIED TYPE: ICD-10-CM

## 2024-01-11 PROCEDURE — 99213 OFFICE O/P EST LOW 20 MIN: CPT

## 2024-01-11 PROCEDURE — 27000999 HC MEDICAL RECORD PHOTO DOCUMENTATION

## 2024-01-11 PROCEDURE — 99213 OFFICE O/P EST LOW 20 MIN: CPT | Mod: 24,,, | Performed by: EMERGENCY MEDICINE

## 2024-01-11 NOTE — PROGRESS NOTES
"  Outpatient Wound Care [facility]    Subjective:     Patient ID: Donovan Enriquez is a 88 y.o. male.    Chief Complaint: Wound care    Return of prior patient    CC: blister RLE and another LLE ulcer        89 y/o WM  with hypertension, neuropathy, mixed PVD with recurrent ulcers, lymphedema, CAD, cardiomegaly, asthma on chronic  Plavix , prior GI bleeds, and anemia who I initially met back in  June 2023 when referred after a rehab stay to help treat a mulitude of LLE stasis ulcers.  His vascular treatment is per Dr Darrian Higgins who has done angiograms and venograms . When I treated him, he had a distal LLE anterior leg ulcer and a left calf ulcer.  Both Healed and he was discharged in early November 2023.  He returned to us last week on 1/4/24 when he noted a new large bulla entity noted on right anterior shin for past week. He says he does wear compression stockings given per Dr Higgins office.  He also has a new ulcer on distal left leg on anterior surface as well just distal to where prior ulcer was located and a skin tear on right foot.  I unroofed the bulla and advised of dressings.  He comes back in today on 01/11/2024 for a recheck.  He has no complaints        Review of Systems   Constitutional: Negative.    HENT: Negative.     Respiratory: Negative.     Cardiovascular: Negative.    Gastrointestinal: Negative.    Musculoskeletal: Negative.    Skin:  Positive for wound.   Neurological: Negative.        Objective:     Visit Vitals  /69 (BP Location: Right arm)   Pulse 73   Temp 98.1 °F (36.7 °C) (Oral)   Resp 16   Ht 6' 2" (1.88 m)   Wt 90.3 kg (199 lb)   BMI 25.55 kg/m²       Physical Exam  Vitals reviewed.   Constitutional:       Comments: Elderly WM   Cardiovascular:      Pulses:           Dorsalis pedis pulses are detected w/ Doppler on the right side and detected w/ Doppler on the left side.      Comments: No ischemic changes to foot  Pulmonary:      Effort: Pulmonary effort is normal. "   Musculoskeletal:      Right lower le+ Edema present.      Left lower le+ Edema present.        Legs:    Skin:     General: Skin is warm and dry.      Capillary Refill: Capillary refill takes less than 2 seconds.   Neurological:      General: No focal deficit present.      Mental Status: He is alert and oriented to person, place, and time. Mental status is at baseline.            Altered Skin Integrity 24 1121 Right anterior;lower Leg #1 Blister(s) (Active)   24 1121   Altered Skin Integrity Present on Admission - Did Patient arrive to the hospital with altered skin?:    Side: Right   Orientation: anterior;lower   Location: Leg   Wound Number: #1   Is this injury device related?: No   Primary Wound Type: Blister(s)   Description of Altered Skin Integrity:    Ankle-Brachial Index: 1.09   Pulses: palpable x 4 biphasic x 4   Removal Indication and Assessment:    Wound Outcome:    (Retired) Wound Length (cm):    (Retired) Wound Width (cm):    (Retired) Depth (cm):    Wound Description (Comments):    Removal Indications:    Wound Image   24 1502   Description of Altered Skin Integrity Full thickness tissue loss. Subcutaneous fat may be visible but bone, tendon or muscle are not exposed 24 1502   Dressing Appearance Intact;Moist drainage 24 1502   Drainage Amount Moderate 24 1502   Drainage Characteristics/Odor Serous;Brown 24 1502   Appearance Pink;Red 24 1502   Tissue loss description Full thickness 24 1502   Black (%), Wound Tissue Color 0 % 24 1502   Red (%), Wound Tissue Color 100 % 24 1502   Yellow (%), Wound Tissue Color 0 % 24 1502   Periwound Area Intact;Excoriated 24 1502   Wound Edges Defined 24 1502   Wound Length (cm) 5.5 cm 24 1502   Wound Width (cm) 4.6 cm 24 1502   Wound Depth (cm) 0.1 cm 24 1502   Wound Volume (cm^3) 2.53 cm^3 24 1502   Wound Surface Area (cm^2) 25.3 cm^2 24 1502    Care Cleansed with:;Antimicrobial agent;Applied: 01/11/24 1502   Dressing Removed;Applied;Calcium alginate;Rolled gauze 01/11/24 1502            Altered Skin Integrity 01/05/24 1122 Right dorsal Foot #2 Traumatic (Active)   01/05/24 1122   Altered Skin Integrity Present on Admission - Did Patient arrive to the hospital with altered skin?: yes   Side: Right   Orientation: dorsal   Location: Foot   Wound Number: #2   Is this injury device related?: No   Primary Wound Type: Traumatic   Description of Altered Skin Integrity:    Ankle-Brachial Index:    Pulses:    Removal Indication and Assessment:    Wound Outcome:    (Retired) Wound Length (cm):    (Retired) Wound Width (cm):    (Retired) Depth (cm):    Wound Description (Comments):    Removal Indications:    Wound Image   01/11/24 1507   Description of Altered Skin Integrity Full thickness tissue loss. Subcutaneous fat may be visible but bone, tendon or muscle are not exposed 01/11/24 1507   Dressing Appearance Intact;Dried drainage 01/11/24 1507   Drainage Amount Scant 01/11/24 1507   Drainage Characteristics/Odor Serous 01/11/24 1507   Appearance Pink;Yellow 01/11/24 1507   Black (%), Wound Tissue Color 0 % 01/11/24 1507   Red (%), Wound Tissue Color 90 % 01/11/24 1507   Yellow (%), Wound Tissue Color 10 % 01/11/24 1507   Periwound Area Intact;Dry 01/11/24 1507   Wound Edges Approximated 01/11/24 1507   Wound Length (cm) 1 cm 01/11/24 1507   Wound Width (cm) 1.3 cm 01/11/24 1507   Wound Depth (cm) 0 cm 01/11/24 1507   Wound Volume (cm^3) 0 cm^3 01/11/24 1507   Wound Surface Area (cm^2) 1.3 cm^2 01/11/24 1507   Care Cleansed with:;Antimicrobial agent;Applied: 01/11/24 1507   Dressing Bandaid 01/11/24 1507            Altered Skin Integrity 01/05/24 1123 Left distal;lower Leg #3 Venous Ulcer (Active)   01/05/24 1123   Altered Skin Integrity Present on Admission - Did Patient arrive to the hospital with altered skin?: yes   Side: Left   Orientation: distal;lower    Location: Leg   Wound Number: #3   Is this injury device related?: No   Primary Wound Type: Venous ulcer   Description of Altered Skin Integrity:    Ankle-Brachial Index:    Pulses:    Removal Indication and Assessment: not present upon hospital arrival   Wound Outcome:    (Retired) Wound Length (cm):    (Retired) Wound Width (cm):    (Retired) Depth (cm):    Wound Description (Comments):    Removal Indications:    Wound Image   01/11/24 1512   Description of Altered Skin Integrity Partial thickness tissue loss. Shallow open ulcer with a red or pink wound bed, without slough. Intact or Open/Ruptured Serum-filled blister. 01/11/24 1512   Dressing Appearance Dry;Dried drainage;Intact 01/11/24 1512   Drainage Amount Scant 01/11/24 1512   Drainage Characteristics/Odor Serous 01/11/24 1512   Appearance Yellow 01/11/24 1512   Tissue loss description Full thickness 01/11/24 1512   Black (%), Wound Tissue Color 0 % 01/11/24 1512   Red (%), Wound Tissue Color 0 % 01/11/24 1512   Yellow (%), Wound Tissue Color 100 % 01/11/24 1512   Periwound Area Intact;Dry 01/11/24 1512   Wound Edges Defined 01/11/24 1512   Wound Length (cm) 0.5 cm 01/11/24 1512   Wound Width (cm) 0.4 cm 01/11/24 1512   Wound Depth (cm) 0 cm 01/11/24 1512   Wound Volume (cm^3) 0 cm^3 01/11/24 1512   Wound Surface Area (cm^2) 0.2 cm^2 01/11/24 1512   Care Cleansed with:;Antimicrobial agent;Applied: 01/11/24 1512   Dressing Bandaid 01/11/24 1512     Labs Reviewed:     Chemistry:  Lab Results   Component Value Date    BUN 20 06/09/2023    BUN 71 (H) 06/06/2023    CREATININE 0.68 06/09/2023    CREATININE 0.98 06/06/2023    GLUCOSE 95 09/04/2020    AST 30 09/04/2020    AST 35 (H) 09/03/2020    ALT 27 09/04/2020    ALT 27 09/03/2020    HGBA1C 5.2 09/02/2020    HGBA1C 5.3 09/02/2020        Hematology:  Lab Results   Component Value Date    WBC 7.9 09/04/2020    WBC 8.1 09/03/2020    HGB 9.4 (L) 06/09/2023    HGB 7.9 (L) 06/08/2023    HCT 27.8 (L) 06/09/2023     "HCT 24.3 (L) 06/08/2023     09/04/2020     09/03/2020       Inflammatory Markers:  No results found for: "HSCRP", "SEDRATE"     hba1c  Lab Results   Component Value Date    HGBA1C 5.2 09/02/2020    HGBA1C 5.3 09/02/2020         Assessment and Plan:     1. Chronic ulcer of left leg with fat layer exposed  Overview:  Recurrent LLE ulcers: anterior and posterior: began late 2022 per patient:h/o +psudomonas treated in this clinic late June -November 2023 ; return to clinic on 1/5/24 for a new ulcer distal left leg near ankle (different than prior one):  Healing      2. Atherosclerosis of left leg  Overview:  April 2022 by Dr ANKIT Higgins; Angiogram: PTA of the left tibioperoneal trunk, posterior tibial artery and anterior tibial artery.       3. Ulcer of right lower extremity, limited to breakdown of skin  Overview:  New blister: return to clinic on 1/5/24:  Unroofed, superficial open wound without signs of infection      4. Idiopathic chronic venous hypertension of right leg with ulcer  Overview:  2022: Venogram :right-sided iliofemoral venous compression syndrome noted:  right external iliac vein, common iliac vein PTV and stenting      5. Atherosclerosis of right leg  Overview:  RLE angiogram with Dr Higgins Aug 2023: no records    6. Hypertension, unspecified type    7. Anemia, unspecified type  Overview:  With h/o GI bleed, June 2023      8. Coronary arteriosclerosis    9. Chronic anticoagulation  Overview:  Plavix        10. Chronic venous hypertension with ulcer and inflammation, left          Plan of Care:    His wounds look good.  The main wound where he had the bulla on the right leg looks healthy although it is still moist and probably has some clear serous weepage per nurse when she took down the dressing.  Continue wound care as noted in the wound discharge orders per nurse    Nutrition: Must have a high protein diet to support wound  healing; (if renal disease, see nephrologist for amount allowed):  " this should be over 100g protein /day (if no kidney issues); Also rec MVI along with vit C, vit D, zinc and Uri  Compression: per Gisela  Return to clinic 1 week    The time spent including preparing to see the patient, obtaining patient history and assessment, evaluation of the plan of care, patient/caregiver counseling and education, orders, documentation, coordination of care, and other professional medical management activities for today's encounter was 20 minute.

## 2024-01-11 NOTE — PATIENT INSTRUCTIONS
Pt seen today by: Dr. Guo    self care DRESSING INSTRUCTIONS:    Dressings to be changed every other day and as needed if soiled or not intact.  Patient will be seen in this clinic on Thursdays  Patient and/or family may be asked to assist on other days.      Wound location: Right Shin      Cleanse wound with wound cleanser or saline  Cover with sorbact to wound bed  Cover with alginate silver  Secure with nubia   Change dressing every other day      Wound location: Right Dorsal Foot  Cleanse wound with wound cleanser or saline  Cover with bandaid  Change dressing every other day    Left Lower Leg  Cleanse wound with wound cleanser or saline   Cover with bandaid  Change dressing every other day    Return visit: 01/18/2024 at 1:30 pm    Wound may have been debrided in clinic: if so, WHAT YOU NEED TO KNOW:  Debridement is the removal of infected, damaged, or dead tissue so a wound can heal properly. Your wound may need more than one debridement. Debridement can cause bleeding, and a small amount of blood is expected.    AFTER A DEBRIDEMENT:  Keep your wound clean and dry. Do not remove the dressing unless instructed.  2. Follow the wound care orders provided to you or your home health care provider.  3. If you have pain, take over the counter pain relievers or pain medication if prescribed.  4. Elevate the wound and limit excessive activity to prevent bleeding and/or swelling in your wound.  If you see blood coming through the dressing, apply gauze and tape over the dressing and hold firm pressure to the wound with your hand for 5-10 minutes continuously, without peeking, to help the bleeding stop.  Contact [facility] wound care team at [phone] or go to the nearest Emergency department if:  You have a fever greater than 101 taken by mouth.  Your pain gets worse or does not go away, even after taking your regular pain medicine.  Your skin around your wound is red, hot, swollen, or draining pus.  You have bleeding  that continues to come through the dressing after holding pressure for 10 minutes     Compression: You may be using a compressive type of dressings to control edema: If so, keep your compression wrap or tubigrip in place. Do not get them wet, they should feel snug. If they feel tight, or cause pain in any way,  elevate your legs above your heart for 15 minutes. If the wraps still cause pain or you can not tolerate compression,  please remove and notify the clinic or your home health.     Nutrition:  The current daily value (%DV) for protein is 50 grams per day and is meant as a general goal for most people. Further increasing your dietary protein intake is very important for wound healing. Typically one needs over 100g of protein per day to help with wound healing needs.  If you are a dialysis patient or have problems with your kidneys, talk to your Nephrologist about how much protein you can take in with your condition.  Examples of high protein items that can be added to your diet include: eggs, chicken, red meats, almonds, cottage cheese, Greek yogurt, beans, and peanut butter.  Fortified protein bars, shakes and drinks can add 15-30 additional grams of protein per serving.   Also add:   1 daily general multivitamin   Uri : 1 packet twice daily   Vitamin C : 500mg twice daily   Zinc 220 mg daily  Vit D : once daily    Offloading   Offload your wound. This means to reduce pressure on and around the wound that reduces blood flow to the wound and prevents healing. Your wound care team will discuss specific ways for you to offload your specific wound. Common offloading strategies include:  Turn or reposition every 2 hours or sooner  Use pillows, wedges, ROHO wheelchair cushions or other special devices like boots and shoes to lift the wound off of hard surfaces  Alternating Low Air-loss (ALAL) mattress may be ordered  Padded dressings can reduce wound pressure      Call our [facility] wound clinic for questions/concerns   [phone] .

## 2024-01-18 ENCOUNTER — HOSPITAL ENCOUNTER (OUTPATIENT)
Dept: WOUND CARE | Facility: HOSPITAL | Age: 89
Discharge: HOME | End: 2024-01-18
Attending: EMERGENCY MEDICINE
Payer: MEDICARE

## 2024-01-18 VITALS
HEIGHT: 74 IN | BODY MASS INDEX: 25.54 KG/M2 | SYSTOLIC BLOOD PRESSURE: 101 MMHG | DIASTOLIC BLOOD PRESSURE: 58 MMHG | WEIGHT: 199 LBS | RESPIRATION RATE: 20 BRPM | HEART RATE: 70 BPM | TEMPERATURE: 98 F

## 2024-01-18 DIAGNOSIS — L97.911 ULCER OF RIGHT LOWER EXTREMITY, LIMITED TO BREAKDOWN OF SKIN: ICD-10-CM

## 2024-01-18 DIAGNOSIS — I87.332 CHRONIC VENOUS HYPERTENSION WITH ULCER AND INFLAMMATION, LEFT: ICD-10-CM

## 2024-01-18 DIAGNOSIS — L97.919 IDIOPATHIC CHRONIC VENOUS HYPERTENSION OF RIGHT LEG WITH ULCER: ICD-10-CM

## 2024-01-18 DIAGNOSIS — Z79.01 CHRONIC ANTICOAGULATION: ICD-10-CM

## 2024-01-18 DIAGNOSIS — L97.922 CHRONIC ULCER OF LEFT LEG WITH FAT LAYER EXPOSED: Primary | ICD-10-CM

## 2024-01-18 DIAGNOSIS — I87.311 IDIOPATHIC CHRONIC VENOUS HYPERTENSION OF RIGHT LEG WITH ULCER: ICD-10-CM

## 2024-01-18 DIAGNOSIS — I10 HYPERTENSION, UNSPECIFIED TYPE: ICD-10-CM

## 2024-01-18 DIAGNOSIS — D64.9 ANEMIA, UNSPECIFIED TYPE: ICD-10-CM

## 2024-01-18 DIAGNOSIS — I70.201 ATHEROSCLEROSIS OF RIGHT LEG: ICD-10-CM

## 2024-01-18 DIAGNOSIS — I70.202 ATHEROSCLEROSIS OF LEFT LEG: ICD-10-CM

## 2024-01-18 PROCEDURE — 99213 OFFICE O/P EST LOW 20 MIN: CPT

## 2024-01-18 PROCEDURE — 99213 OFFICE O/P EST LOW 20 MIN: CPT | Mod: ,,, | Performed by: EMERGENCY MEDICINE

## 2024-01-18 PROCEDURE — 27000999 HC MEDICAL RECORD PHOTO DOCUMENTATION

## 2024-01-18 NOTE — PATIENT INSTRUCTIONS
Pt seen today by: Dr. Guo    self care DRESSING INSTRUCTIONS:    Dressings to be changed every other day and as needed if soiled or not intact.  Patient will be seen in this clinic on Thursdays  Patient and/or family may be asked to assist on other days.      Wound location: Right Shin      Cleanse wound with wound cleanser or saline  Cover with sorbact to wound bed  Cover with alginate silver  Secure with nubia   Change dressing every other day          Return visit: 01/25/2024 at 2:00pm    Wound may have been debrided in clinic: if so, WHAT YOU NEED TO KNOW:  Debridement is the removal of infected, damaged, or dead tissue so a wound can heal properly. Your wound may need more than one debridement. Debridement can cause bleeding, and a small amount of blood is expected.    AFTER A DEBRIDEMENT:  Keep your wound clean and dry. Do not remove the dressing unless instructed.  2. Follow the wound care orders provided to you or your home health care provider.  3. If you have pain, take over the counter pain relievers or pain medication if prescribed.  4. Elevate the wound and limit excessive activity to prevent bleeding and/or swelling in your wound.  If you see blood coming through the dressing, apply gauze and tape over the dressing and hold firm pressure to the wound with your hand for 5-10 minutes continuously, without peeking, to help the bleeding stop.  Contact [facility] wound care team at [phone] or go to the nearest Emergency department if:  You have a fever greater than 101 taken by mouth.  Your pain gets worse or does not go away, even after taking your regular pain medicine.  Your skin around your wound is red, hot, swollen, or draining pus.  You have bleeding that continues to come through the dressing after holding pressure for 10 minutes     Compression: You may be using a compressive type of dressings to control edema: If so, keep your compression wrap or tubigrip in place. Do not get them wet, they  should feel snug. If they feel tight, or cause pain in any way,  elevate your legs above your heart for 15 minutes. If the wraps still cause pain or you can not tolerate compression,  please remove and notify the clinic or your home health.     Nutrition:  The current daily value (%DV) for protein is 50 grams per day and is meant as a general goal for most people. Further increasing your dietary protein intake is very important for wound healing. Typically one needs over 100g of protein per day to help with wound healing needs.  If you are a dialysis patient or have problems with your kidneys, talk to your Nephrologist about how much protein you can take in with your condition.  Examples of high protein items that can be added to your diet include: eggs, chicken, red meats, almonds, cottage cheese, Greek yogurt, beans, and peanut butter.  Fortified protein bars, shakes and drinks can add 15-30 additional grams of protein per serving.   Also add:   1 daily general multivitamin   Uri : 1 packet twice daily   Vitamin C : 500mg twice daily   Zinc 220 mg daily  Vit D : once daily    Offloading   Offload your wound. This means to reduce pressure on and around the wound that reduces blood flow to the wound and prevents healing. Your wound care team will discuss specific ways for you to offload your specific wound. Common offloading strategies include:  Turn or reposition every 2 hours or sooner  Use pillows, wedges, ROHO wheelchair cushions or other special devices like boots and shoes to lift the wound off of hard surfaces  Alternating Low Air-loss (ALAL) mattress may be ordered  Padded dressings can reduce wound pressure      Call our [facility] wound clinic for questions/concerns a [phone] .

## 2024-01-25 ENCOUNTER — HOSPITAL ENCOUNTER (OUTPATIENT)
Dept: WOUND CARE | Facility: HOSPITAL | Age: 89
Discharge: HOME | End: 2024-01-25
Attending: EMERGENCY MEDICINE
Payer: MEDICARE

## 2024-01-25 VITALS
SYSTOLIC BLOOD PRESSURE: 119 MMHG | BODY MASS INDEX: 25.54 KG/M2 | HEIGHT: 74 IN | HEART RATE: 71 BPM | TEMPERATURE: 98 F | DIASTOLIC BLOOD PRESSURE: 57 MMHG | WEIGHT: 199 LBS | RESPIRATION RATE: 20 BRPM

## 2024-01-25 DIAGNOSIS — S90.415A TOE ABRASION, LEFT, INITIAL ENCOUNTER: ICD-10-CM

## 2024-01-25 DIAGNOSIS — I10 HYPERTENSION, UNSPECIFIED TYPE: ICD-10-CM

## 2024-01-25 DIAGNOSIS — L97.922 CHRONIC ULCER OF LEFT LEG WITH FAT LAYER EXPOSED: ICD-10-CM

## 2024-01-25 DIAGNOSIS — I87.311 IDIOPATHIC CHRONIC VENOUS HYPERTENSION OF RIGHT LEG WITH ULCER: ICD-10-CM

## 2024-01-25 DIAGNOSIS — I87.332 CHRONIC VENOUS HYPERTENSION WITH ULCER AND INFLAMMATION, LEFT: ICD-10-CM

## 2024-01-25 DIAGNOSIS — D64.9 ANEMIA, UNSPECIFIED TYPE: ICD-10-CM

## 2024-01-25 DIAGNOSIS — L97.919 IDIOPATHIC CHRONIC VENOUS HYPERTENSION OF RIGHT LEG WITH ULCER: ICD-10-CM

## 2024-01-25 DIAGNOSIS — I70.202 ATHEROSCLEROSIS OF LEFT LEG: ICD-10-CM

## 2024-01-25 DIAGNOSIS — L97.911 ULCER OF RIGHT LOWER EXTREMITY, LIMITED TO BREAKDOWN OF SKIN: ICD-10-CM

## 2024-01-25 PROCEDURE — 99213 OFFICE O/P EST LOW 20 MIN: CPT | Mod: 25,,, | Performed by: EMERGENCY MEDICINE

## 2024-01-25 PROCEDURE — 97597 DBRDMT OPN WND 1ST 20 CM/<: CPT

## 2024-01-25 PROCEDURE — 27000999 HC MEDICAL RECORD PHOTO DOCUMENTATION

## 2024-01-25 PROCEDURE — 97597 DBRDMT OPN WND 1ST 20 CM/<: CPT | Mod: ,,, | Performed by: EMERGENCY MEDICINE

## 2024-01-25 NOTE — PROCEDURES
Debridement    Date/Time: 1/25/2024 2:20 PM    Performed by: Liliana Guo MD  Authorized by: Liliana Gou MD  Associated wounds:        Altered Skin Integrity 01/05/24 1121 Right anterior;lower Leg #1 Blister(s)  Consent Done?:  Yes (Written)  Local anesthesia used?: Yes    Local anesthetic:  Topical anesthetic    Wound Details:    Location:  Right leg    Type of Debridement:  Excisional       Length (cm):  4.7       Area (sq cm):  15.98       Width (cm):  3.4       Percent Debrided (%):  100       Depth (cm):  0.1       Total Area Debrided (sq cm):  15.98    Depth of debridement:  Epidermis/Dermis    Tissue debrided:  Dermis and Epidermis    Devitalized tissue debrided:  Biofilm and Slough    Instruments:  Curette  Bleeding:  None  Patient tolerance:  Patient tolerated the procedure well with no immediate complications

## 2024-01-25 NOTE — PROGRESS NOTES
"  [facility]    Subjective:     Patient ID: Donovan Enriquez is a 88 y.o. male.    Chief Complaint: Wound care    CC: RLE ulcer      87 y/o WM with hypertension, neuropathy, mixed PVD with recurrent ulcers, lymphedema, CAD, cardiomegaly, asthma on chronic  Plavix , prior GI bleeds, and anemia who I initially met back in  June 2023 when referred after a rehab stay to help treat a mulitude of LLE stasis ulcers.  His vascular treatment is per Dr Darrian Higgins who has done angiograms and venograms . When I treated him, he had a distal LLE anterior leg ulcer and a left calf ulcer.  Both healed and he was discharged in early November 2023.  He returned on 1/4/24 when he noted a new large bulla entity noted on right anterior shin for past week. He says he does wear compression stockings given per Dr Higgins office.  He also has a new ulcer on distal left leg on anterior surface as well just distal to where prior ulcer was located and a skin tear on right foot.  I unroofed the bulla and advised of dressings. Since then, the leg ulcer better but still present; right foot and LLE ulcer healed  Today on 1/25/24, we note new blood blister on left 3rd dorsal toe and abrasion on left 4th toe: he hit the toes the other day        Review of Systems   Constitutional: Negative.    HENT: Negative.     Respiratory: Negative.     Cardiovascular: Negative.    Gastrointestinal: Negative.    Musculoskeletal: Negative.    Skin:  Positive for wound.   Neurological: Negative.        Objective:     Visit Vitals  BP (!) 119/57 (BP Location: Right arm, Patient Position: Sitting)   Pulse 71   Temp 98.1 °F (36.7 °C) (Oral)   Resp 20   Ht 6' 2" (1.88 m)   Wt 90.3 kg (199 lb)   BMI 25.55 kg/m²       Physical Exam  Vitals reviewed.   Constitutional:       Comments: Elderly WM   Cardiovascular:      Pulses:           Dorsalis pedis pulses are detected w/ Doppler on the right side and detected w/ Doppler on the left side.      " Comments: No ischemic changes to foot  Pulmonary:      Effort: Pulmonary effort is normal.   Musculoskeletal:      Right lower le+ Edema present.      Left lower le+ Edema present.        Legs:    Skin:     General: Skin is warm and dry.      Capillary Refill: Capillary refill takes less than 2 seconds.   Neurological:      General: No focal deficit present.      Mental Status: He is alert and oriented to person, place, and time. Mental status is at baseline.            Altered Skin Integrity 24 1121 Right anterior;lower Leg #1 Blister(s) (Active)   24 1121   Altered Skin Integrity Present on Admission - Did Patient arrive to the hospital with altered skin?:    Side: Right   Orientation: anterior;lower   Location: Leg   Wound Number: #1   Is this injury device related?: No   Primary Wound Type: Blister(s)   Description of Altered Skin Integrity:    Ankle-Brachial Index: 1.09   Pulses: palpable x 4  biphasic x 3 / R dp mono   Removal Indication and Assessment:    Wound Outcome:    (Retired) Wound Length (cm):    (Retired) Wound Width (cm):    (Retired) Depth (cm):    Wound Description (Comments):    Removal Indications:    Wound Image   24 141   Description of Altered Skin Integrity Full thickness tissue loss. Subcutaneous fat may be visible but bone, tendon or muscle are not exposed 24 141   Dressing Appearance Intact;Moist drainage 24 1410   Drainage Amount Moderate 24 1410   Drainage Characteristics/Odor Yellow;Serous 24   Appearance Pink;Yellow 24 1410   Tissue loss description Full thickness 24 1410   Black (%), Wound Tissue Color 0 % 24 1410   Red (%), Wound Tissue Color 60 % 24 1410   Yellow (%), Wound Tissue Color 40 % 24 141   Periwound Area Intact;Redness;Moist 24 141   Wound Edges Defined 24 1410   Wound Length (cm) 4.7 cm 24 1410   Wound Width (cm) 3.4 cm 24 1410   Wound Depth (cm) 0.1 cm  "01/25/24 1410   Wound Volume (cm^3) 1.598 cm^3 01/25/24 1410   Wound Surface Area (cm^2) 15.98 cm^2 01/25/24 1410   Care Cleansed with:;Antimicrobial agent;Applied: 01/25/24 1410     Labs Reviewed:     Chemistry:  Lab Results   Component Value Date    BUN 20 06/09/2023    BUN 71 (H) 06/06/2023    CREATININE 0.68 06/09/2023    CREATININE 0.98 06/06/2023    GLUCOSE 95 09/04/2020    AST 30 09/04/2020    AST 35 (H) 09/03/2020    ALT 27 09/04/2020    ALT 27 09/03/2020    HGBA1C 5.2 09/02/2020    HGBA1C 5.3 09/02/2020        Hematology:  Lab Results   Component Value Date    WBC 7.9 09/04/2020    WBC 8.1 09/03/2020    HGB 9.4 (L) 06/09/2023    HGB 7.9 (L) 06/08/2023    HCT 27.8 (L) 06/09/2023    HCT 24.3 (L) 06/08/2023     09/04/2020     09/03/2020       Inflammatory Markers:  No results found for: "HSCRP", "SEDRATE"     hba1c  Lab Results   Component Value Date    HGBA1C 5.2 09/02/2020    HGBA1C 5.3 09/02/2020         Assessment and Plan:       Recurrent LLE ulcers:  : anterior and posterior: began late 2022 per patient:h/o +psudomonas treated in this clinic late June -November 2023 ; return to clinic on 1/5/24 for a new ulcer distal left leg near ankle (different than prior one):  Healed as of Jan 2024  RLE blister /anterior leg ulcer: 1/5/24  Mixed PVD   Atherosclerosis of left leg: managed by Dr Darrian Farnsworth: arterial US jan 2024: RLE: mild right CFA; right PFA, popliteal, PTA, LALITA and DPA with moderate disease; prior angiograms both legs with intervention (last BLE Aug 2023 right LALITA and PTA stent placement; atherectomy and angioplasty of left PTA)  Chronic venous hypertension with multiple venogram with treatment by Dr farnsworth; last one on 2023    Hypertension      Plan of Care:    RLE was debrided today, selective  Protect left toes, monitor and recheck next week.  Wound Care Orders: dressings of sorbact under drawtex (instead of alginate this week)  Nutrition: Must have a high protein diet to support " wound  healing; (if renal disease, see nephrologist for amount allowed):  this should be over 100g protein /day (if no kidney issues); Also rec MVI along with vit C, vit D, zinc and Uri  Compression:  per Gisela  Return to clinic 1 week      The time spent including preparing to see the patient, obtaining patient history and assessment, evaluation of the plan of care, patient/caregiver counseling and education, orders, documentation, coordination of care, and other professional medical management activities for today's encounter was 20 minute.  Time spent performing procedures during today's encounter was 5 minutes.

## 2024-01-25 NOTE — PATIENT INSTRUCTIONS
Pt seen today by: Dr. Guo    self care DRESSING INSTRUCTIONS:    Dressings to be changed every other day and as needed if soiled or not intact.  Patient will be seen in this clinic on Thursdays  Patient and/or family may be asked to assist on other days.      Wound location: Right Shin      Cleanse wound with wound cleanser or saline  Cover with sorbact to wound bed  Cover with drawtex  Secure with nubia   Change dressing every other day          Return visit: February 1st, 2024 at 2:00 pm    Wound may have been debrided in clinic: if so, WHAT YOU NEED TO KNOW:  Debridement is the removal of infected, damaged, or dead tissue so a wound can heal properly. Your wound may need more than one debridement. Debridement can cause bleeding, and a small amount of blood is expected.    AFTER A DEBRIDEMENT:  Keep your wound clean and dry. Do not remove the dressing unless instructed.  2. Follow the wound care orders provided to you or your home health care provider.  3. If you have pain, take over the counter pain relievers or pain medication if prescribed.  4. Elevate the wound and limit excessive activity to prevent bleeding and/or swelling in your wound.  If you see blood coming through the dressing, apply gauze and tape over the dressing and hold firm pressure to the wound with your hand for 5-10 minutes continuously, without peeking, to help the bleeding stop.  Contact [facility] wound care team at [phone] or go to the nearest Emergency department if:  You have a fever greater than 101 taken by mouth.  Your pain gets worse or does not go away, even after taking your regular pain medicine.  Your skin around your wound is red, hot, swollen, or draining pus.  You have bleeding that continues to come through the dressing after holding pressure for 10 minutes     Compression: You may be using a compressive type of dressings to control edema: If so, keep your compression wrap or tubigrip in place. Do not get them wet, they  should feel snug. If they feel tight, or cause pain in any way,  elevate your legs above your heart for 15 minutes. If the wraps still cause pain or you can not tolerate compression,  please remove and notify the clinic or your home health.     Nutrition:  The current daily value (%DV) for protein is 50 grams per day and is meant as a general goal for most people. Further increasing your dietary protein intake is very important for wound healing. Typically one needs over 100g of protein per day to help with wound healing needs.  If you are a dialysis patient or have problems with your kidneys, talk to your Nephrologist about how much protein you can take in with your condition.  Examples of high protein items that can be added to your diet include: eggs, chicken, red meats, almonds, cottage cheese, Greek yogurt, beans, and peanut butter.  Fortified protein bars, shakes and drinks can add 15-30 additional grams of protein per serving.   Also add:   1 daily general multivitamin   Uri : 1 packet twice daily   Vitamin C : 500mg twice daily   Zinc 220 mg daily  Vit D : once daily    Offloading   Offload your wound. This means to reduce pressure on and around the wound that reduces blood flow to the wound and prevents healing. Your wound care team will discuss specific ways for you to offload your specific wound. Common offloading strategies include:  Turn or reposition every 2 hours or sooner  Use pillows, wedges, ROHO wheelchair cushions or other special devices like boots and shoes to lift the wound off of hard surfaces  Alternating Low Air-loss (ALAL) mattress may be ordered  Padded dressings can reduce wound pressure      Call our [facility] wound clinic for questions/concerns a [phone] .

## 2024-01-31 ENCOUNTER — HOSPITAL ENCOUNTER (INPATIENT)
Facility: HOSPITAL | Age: 89
LOS: 2 days | Discharge: NURSING, REHAB OR LONG-TERM CARE | DRG: 563 | End: 2024-02-02
Attending: STUDENT IN AN ORGANIZED HEALTH CARE EDUCATION/TRAINING PROGRAM | Admitting: STUDENT IN AN ORGANIZED HEALTH CARE EDUCATION/TRAINING PROGRAM
Payer: MEDICARE

## 2024-01-31 DIAGNOSIS — S42.002A CLOSED DISPLACED FRACTURE OF LEFT CLAVICLE, UNSPECIFIED PART OF CLAVICLE, INITIAL ENCOUNTER: ICD-10-CM

## 2024-01-31 DIAGNOSIS — S22.42XA CLOSED FRACTURE OF MULTIPLE RIBS OF LEFT SIDE, INITIAL ENCOUNTER: ICD-10-CM

## 2024-01-31 DIAGNOSIS — S01.01XA LACERATION OF SCALP, INITIAL ENCOUNTER: ICD-10-CM

## 2024-01-31 DIAGNOSIS — S42.032A CLOSED DISPLACED FRACTURE OF ACROMIAL END OF LEFT CLAVICLE, INITIAL ENCOUNTER: ICD-10-CM

## 2024-01-31 DIAGNOSIS — S22.000A COMPRESSION FRACTURE OF BODY OF THORACIC VERTEBRA: ICD-10-CM
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
## 2024-01-31 DIAGNOSIS — R52 PAIN: ICD-10-CM

## 2024-01-31 PROBLEM — S22.080A COMPRESSION FRACTURE OF T11 VERTEBRA: Status: ACTIVE | Noted: 2024-01-31

## 2024-01-31 LAB
ABORH RETYPE: NORMAL
ALBUMIN SERPL-MCNC: 3.4 G/DL (ref 3.4–4.8)
ALBUMIN/GLOB SERPL: 1 RATIO (ref 1.1–2)
ALP SERPL-CCNC: 47 UNIT/L (ref 40–150)
ALT SERPL-CCNC: 32 UNIT/L (ref 0–55)
APTT PPP: 30.1 SECONDS (ref 23.2–33.7)
AST SERPL-CCNC: 40 UNIT/L (ref 5–34)
BASOPHILS # BLD AUTO: 0.04 X10(3)/MCL
BASOPHILS NFR BLD AUTO: 0.5 %
BILIRUB SERPL-MCNC: 0.5 MG/DL
BUN SERPL-MCNC: 30.7 MG/DL (ref 8.4–25.7)
CALCIUM SERPL-MCNC: 9.4 MG/DL (ref 8.8–10)
CHLORIDE SERPL-SCNC: 106 MMOL/L (ref 98–107)
CO2 SERPL-SCNC: 25 MMOL/L (ref 23–31)
CREAT SERPL-MCNC: 0.78 MG/DL (ref 0.73–1.18)
EOSINOPHIL # BLD AUTO: 0.12 X10(3)/MCL (ref 0–0.9)
EOSINOPHIL NFR BLD AUTO: 1.4 %
ERYTHROCYTE [DISTWIDTH] IN BLOOD BY AUTOMATED COUNT: 15 % (ref 11.5–17)
GFR SERPLBLD CREATININE-BSD FMLA CKD-EPI: >60 MLS/MIN/1.73/M2
GLOBULIN SER-MCNC: 3.4 GM/DL (ref 2.4–3.5)
GLUCOSE SERPL-MCNC: 124 MG/DL (ref 82–115)
GROUP & RH: NORMAL
HCT VFR BLD AUTO: 47.6 % (ref 42–52)
HGB BLD-MCNC: 14.9 G/DL (ref 14–18)
IMM GRANULOCYTES # BLD AUTO: 0.06 X10(3)/MCL (ref 0–0.04)
IMM GRANULOCYTES NFR BLD AUTO: 0.7 %
INDIRECT COOMBS: NORMAL
INR PPP: 1.1
LYMPHOCYTES # BLD AUTO: 1.93 X10(3)/MCL (ref 0.6–4.6)
LYMPHOCYTES NFR BLD AUTO: 22 %
MCH RBC QN AUTO: 29.2 PG (ref 27–31)
MCHC RBC AUTO-ENTMCNC: 31.3 G/DL (ref 33–36)
MCV RBC AUTO: 93.3 FL (ref 80–94)
MONOCYTES # BLD AUTO: 0.85 X10(3)/MCL (ref 0.1–1.3)
MONOCYTES NFR BLD AUTO: 9.7 %
NEUTROPHILS # BLD AUTO: 5.76 X10(3)/MCL (ref 2.1–9.2)
NEUTROPHILS NFR BLD AUTO: 65.7 %
NRBC BLD AUTO-RTO: 0 %
PLATELET # BLD AUTO: 195 X10(3)/MCL (ref 130–400)
PMV BLD AUTO: 9.8 FL (ref 7.4–10.4)
POTASSIUM SERPL-SCNC: 4.6 MMOL/L (ref 3.5–5.1)
PROT SERPL-MCNC: 6.8 GM/DL (ref 5.8–7.6)
PROTHROMBIN TIME: 14 SECONDS (ref 12.5–14.5)
RBC # BLD AUTO: 5.1 X10(6)/MCL (ref 4.7–6.1)
SODIUM SERPL-SCNC: 141 MMOL/L (ref 136–145)
SPECIMEN OUTDATE: NORMAL
WBC # SPEC AUTO: 8.76 X10(3)/MCL (ref 4.5–11.5)

## 2024-01-31 PROCEDURE — 63600175 PHARM REV CODE 636 W HCPCS: Performed by: STUDENT IN AN ORGANIZED HEALTH CARE EDUCATION/TRAINING PROGRAM

## 2024-01-31 PROCEDURE — 25000003 PHARM REV CODE 250: Performed by: STUDENT IN AN ORGANIZED HEALTH CARE EDUCATION/TRAINING PROGRAM

## 2024-01-31 PROCEDURE — 63600175 PHARM REV CODE 636 W HCPCS

## 2024-01-31 PROCEDURE — 25000003 PHARM REV CODE 250

## 2024-01-31 PROCEDURE — 3E0234Z INTRODUCTION OF SERUM, TOXOID AND VACCINE INTO MUSCLE, PERCUTANEOUS APPROACH: ICD-10-PCS | Performed by: STUDENT IN AN ORGANIZED HEALTH CARE EDUCATION/TRAINING PROGRAM

## 2024-01-31 PROCEDURE — 85610 PROTHROMBIN TIME: CPT | Performed by: STUDENT IN AN ORGANIZED HEALTH CARE EDUCATION/TRAINING PROGRAM

## 2024-01-31 PROCEDURE — 25500020 PHARM REV CODE 255: Performed by: STUDENT IN AN ORGANIZED HEALTH CARE EDUCATION/TRAINING PROGRAM

## 2024-01-31 PROCEDURE — 96374 THER/PROPH/DIAG INJ IV PUSH: CPT

## 2024-01-31 PROCEDURE — 99285 EMERGENCY DEPT VISIT HI MDM: CPT | Mod: 25

## 2024-01-31 PROCEDURE — 85730 THROMBOPLASTIN TIME PARTIAL: CPT | Performed by: STUDENT IN AN ORGANIZED HEALTH CARE EDUCATION/TRAINING PROGRAM

## 2024-01-31 PROCEDURE — 90715 TDAP VACCINE 7 YRS/> IM: CPT | Performed by: STUDENT IN AN ORGANIZED HEALTH CARE EDUCATION/TRAINING PROGRAM

## 2024-01-31 PROCEDURE — 90471 IMMUNIZATION ADMIN: CPT | Performed by: STUDENT IN AN ORGANIZED HEALTH CARE EDUCATION/TRAINING PROGRAM

## 2024-01-31 PROCEDURE — 0HQ0XZZ REPAIR SCALP SKIN, EXTERNAL APPROACH: ICD-10-PCS | Performed by: STUDENT IN AN ORGANIZED HEALTH CARE EDUCATION/TRAINING PROGRAM

## 2024-01-31 PROCEDURE — 80053 COMPREHEN METABOLIC PANEL: CPT | Performed by: STUDENT IN AN ORGANIZED HEALTH CARE EDUCATION/TRAINING PROGRAM

## 2024-01-31 PROCEDURE — 85025 COMPLETE CBC W/AUTO DIFF WBC: CPT | Performed by: STUDENT IN AN ORGANIZED HEALTH CARE EDUCATION/TRAINING PROGRAM

## 2024-01-31 PROCEDURE — 86850 RBC ANTIBODY SCREEN: CPT | Performed by: STUDENT IN AN ORGANIZED HEALTH CARE EDUCATION/TRAINING PROGRAM

## 2024-01-31 PROCEDURE — G0390 TRAUMA RESPONS W/HOSP CRITI: HCPCS

## 2024-01-31 PROCEDURE — 11000001 HC ACUTE MED/SURG PRIVATE ROOM

## 2024-01-31 RX ORDER — ACETAMINOPHEN 325 MG/1
650 TABLET ORAL EVERY 4 HOURS
Status: DISCONTINUED | OUTPATIENT
Start: 2024-01-31 | End: 2024-02-02 | Stop reason: HOSPADM

## 2024-01-31 RX ORDER — PANTOPRAZOLE SODIUM 40 MG/1
40 TABLET, DELAYED RELEASE ORAL DAILY
COMMUNITY

## 2024-01-31 RX ORDER — VALSARTAN 320 MG/1
320 TABLET ORAL DAILY
COMMUNITY

## 2024-01-31 RX ORDER — BUDESONIDE AND FORMOTEROL FUMARATE DIHYDRATE 160; 4.5 UG/1; UG/1
2 AEROSOL RESPIRATORY (INHALATION) EVERY 12 HOURS
COMMUNITY

## 2024-01-31 RX ORDER — LIDOCAINE HCL/EPINEPHRINE/PF 2%-1:200K
1 VIAL (ML) INJECTION ONCE
Status: COMPLETED | OUTPATIENT
Start: 2024-01-31 | End: 2024-01-31

## 2024-01-31 RX ORDER — SILVER NITRATE 38.21; 12.74 MG/1; MG/1
1 STICK TOPICAL ONCE
Status: COMPLETED | OUTPATIENT
Start: 2024-01-31 | End: 2024-01-31

## 2024-01-31 RX ORDER — LIDOCAINE HYDROCHLORIDE AND EPINEPHRINE 10; 10 MG/ML; UG/ML
10 INJECTION, SOLUTION INFILTRATION; PERINEURAL ONCE
Status: DISCONTINUED | OUTPATIENT
Start: 2024-01-31 | End: 2024-01-31

## 2024-01-31 RX ORDER — CLOPIDOGREL BISULFATE 75 MG/1
75 TABLET ORAL DAILY
COMMUNITY

## 2024-01-31 RX ORDER — ASPIRIN 81 MG/1
81 TABLET ORAL DAILY
COMMUNITY

## 2024-01-31 RX ORDER — LANOLIN ALCOHOL/MO/W.PET/CERES
100 CREAM (GRAM) TOPICAL DAILY
COMMUNITY

## 2024-01-31 RX ORDER — MULTIVITAMIN
1 TABLET ORAL DAILY
COMMUNITY

## 2024-01-31 RX ORDER — ROSUVASTATIN CALCIUM 20 MG/1
20 TABLET, COATED ORAL DAILY
COMMUNITY

## 2024-01-31 RX ORDER — DOCUSATE SODIUM 100 MG/1
100 CAPSULE, LIQUID FILLED ORAL 2 TIMES DAILY
Status: DISCONTINUED | OUTPATIENT
Start: 2024-01-31 | End: 2024-02-02 | Stop reason: HOSPADM

## 2024-01-31 RX ORDER — SODIUM CHLORIDE, SODIUM LACTATE, POTASSIUM CHLORIDE, CALCIUM CHLORIDE 600; 310; 30; 20 MG/100ML; MG/100ML; MG/100ML; MG/100ML
INJECTION, SOLUTION INTRAVENOUS CONTINUOUS
Status: DISCONTINUED | OUTPATIENT
Start: 2024-02-01 | End: 2024-02-02 | Stop reason: HOSPADM

## 2024-01-31 RX ORDER — CEFAZOLIN SODIUM 1 G/3ML
INJECTION, POWDER, FOR SOLUTION INTRAMUSCULAR; INTRAVENOUS
Status: DISPENSED
Start: 2024-01-31 | End: 2024-01-31

## 2024-01-31 RX ORDER — TALC
6 POWDER (GRAM) TOPICAL NIGHTLY PRN
Status: DISCONTINUED | OUTPATIENT
Start: 2024-01-31 | End: 2024-02-02 | Stop reason: HOSPADM

## 2024-01-31 RX ORDER — METHOCARBAMOL 500 MG/1
500 TABLET, FILM COATED ORAL EVERY 8 HOURS
Status: DISCONTINUED | OUTPATIENT
Start: 2024-01-31 | End: 2024-02-02 | Stop reason: HOSPADM

## 2024-01-31 RX ORDER — MULTIVIT WITH IRON,MINERALS
TABLET,CHEWABLE ORAL
COMMUNITY

## 2024-01-31 RX ORDER — DUPILUMAB 300 MG/2ML
INJECTION, SOLUTION SUBCUTANEOUS
COMMUNITY

## 2024-01-31 RX ORDER — POLYETHYLENE GLYCOL 3350 17 G/17G
17 POWDER, FOR SOLUTION ORAL 2 TIMES DAILY
Status: DISCONTINUED | OUTPATIENT
Start: 2024-01-31 | End: 2024-02-02 | Stop reason: HOSPADM

## 2024-01-31 RX ORDER — ENOXAPARIN SODIUM 100 MG/ML
40 INJECTION SUBCUTANEOUS EVERY 12 HOURS
Status: DISCONTINUED | OUTPATIENT
Start: 2024-01-31 | End: 2024-01-31

## 2024-01-31 RX ORDER — CHOLECALCIFEROL (VITAMIN D3) 25 MCG
2000 TABLET ORAL DAILY
COMMUNITY

## 2024-01-31 RX ORDER — ADHESIVE BANDAGE
30 BANDAGE TOPICAL DAILY PRN
Status: DISCONTINUED | OUTPATIENT
Start: 2024-01-31 | End: 2024-02-02 | Stop reason: HOSPADM

## 2024-01-31 RX ORDER — PYRIDOXINE HCL (VITAMIN B6) 100 MG
50 TABLET ORAL DAILY
COMMUNITY

## 2024-01-31 RX ORDER — CETIRIZINE HYDROCHLORIDE 10 MG/1
10 TABLET ORAL DAILY
COMMUNITY

## 2024-01-31 RX ORDER — CEFAZOLIN SODIUM 1 G/3ML
2 INJECTION, POWDER, FOR SOLUTION INTRAMUSCULAR; INTRAVENOUS
Status: COMPLETED | OUTPATIENT
Start: 2024-01-31 | End: 2024-01-31

## 2024-01-31 RX ORDER — OXYCODONE HYDROCHLORIDE 5 MG/1
5 TABLET ORAL EVERY 4 HOURS PRN
Status: DISCONTINUED | OUTPATIENT
Start: 2024-01-31 | End: 2024-02-02 | Stop reason: HOSPADM

## 2024-01-31 RX ADMIN — TETANUS TOXOID, REDUCED DIPHTHERIA TOXOID AND ACELLULAR PERTUSSIS VACCINE, ADSORBED 0.5 ML: 5; 2.5; 8; 8; 2.5 SUSPENSION INTRAMUSCULAR at 11:01

## 2024-01-31 RX ADMIN — IOPAMIDOL 100 ML: 755 INJECTION, SOLUTION INTRAVENOUS at 03:01

## 2024-01-31 RX ADMIN — ACETAMINOPHEN 325MG 650 MG: 325 TABLET ORAL at 06:01

## 2024-01-31 RX ADMIN — SILVER NITRATE APPLICATORS 1 APPLICATOR: 25; 75 STICK TOPICAL at 02:01

## 2024-01-31 RX ADMIN — LIDOCAINE HYDROCHLORIDE,EPINEPHRINE BITARTRATE 1 ML: 20; .005 INJECTION, SOLUTION EPIDURAL; INFILTRATION; INTRACAUDAL; PERINEURAL at 01:01

## 2024-01-31 RX ADMIN — POLYETHYLENE GLYCOL 3350 17 G: 17 POWDER, FOR SOLUTION ORAL at 08:01

## 2024-01-31 RX ADMIN — DOCUSATE SODIUM 100 MG: 100 CAPSULE, LIQUID FILLED ORAL at 08:01

## 2024-01-31 RX ADMIN — METHOCARBAMOL 500 MG: 500 TABLET ORAL at 09:01

## 2024-01-31 RX ADMIN — CEFAZOLIN 2 G: 330 INJECTION, POWDER, FOR SOLUTION INTRAMUSCULAR; INTRAVENOUS at 11:01

## 2024-01-31 RX ADMIN — ACETAMINOPHEN 325MG 650 MG: 325 TABLET ORAL at 09:01

## 2024-01-31 RX ADMIN — SODIUM CHLORIDE, POTASSIUM CHLORIDE, SODIUM LACTATE AND CALCIUM CHLORIDE: 600; 310; 30; 20 INJECTION, SOLUTION INTRAVENOUS at 11:01

## 2024-01-31 NOTE — CONSULTS
"   Trauma Surgery   Activation Note    Patient Name: Nubia Lomas Jr.  MRN: [id number]   YOB: 1936  Date: 01/31/2024    LEVEL 2 TRAUMA     Subjective:   History of present illness: Patient is an approximately 88 year old male arrived as level 2 trauma activation. States tripped getting up with walker and hit head. No LOC. On Plavix. Arrives with bleeding scalp laceration controlled with pressure but initially bleeding. Has susperifical skin tear to left forearm. GCS 15. VSS.     Primary Survey:  A Patent. Speaking.    B Normal WOB. No BARDALES.   C No active bleeding requiring intervention. Pulses intact.    D GCS 15(E 4, V 5, M 6)    E exposed, log-rolled and examined (see below)   F See below     VITAL SIGNS: 24 HR MIN & MAX LAST   Temp  Min: 98.2 °F (36.8 °C)  Max: 98.4 °F (36.9 °C)  98.4 °F (36.9 °C)   BP  Min: 123/75  Max: 150/79  123/75    Pulse  Min: 68  Max: 76  68    Resp  Min: 18  Max: 23  (!) 22    SpO2  Min: 91 %  Max: 95 %  95 %      HT: 6' 2" (188 cm)  WT: 81.6 kg (180 lb)  BMI: 23.1     FAST: deferred    Medications/transfusions received en-route: none  Medications/transfusions received in trauma bay: Ancef. Tetanus.    Scheduled Meds:   LIDOcaine-EPINEPHrine (PF) 2%-1:200,000  1 mL Other Once     Continuous Infusions:  PRN Meds:    ROS: 12 point ROS negative except as stated in HPI    Allergies: NKDA  PMH: vascular disease on Plavix  PSH: Unknown  Social history: Reviewed. Denies tobacco use and alcohol use.   Objective:   Secondary Survey:   General: Well developed, well nourished, no acute distress, AAOx3  Neuro: CNII-XII grossly intact  HEENT:  Normocephalic, atraumatic, PERRL  CV:  RRR  Pulse: 2+ RP b/l, 2+ DP b/l   Resp:  Non-labored breathing, satting on room air  GI:  Abdomen soft, non-tender, non-distended  :  Normal external genitalia, no blood at urethral meatus.   Rectal: Normal tone, no gross blood.  Extremities: Moves all 4 spontaneously and purposefully, no obvious " gross deformities.  Back/Spine: No bony TTP, no palpable step offs or deformities.  Cervical back: Normal. No tenderness.  Thoracic back: Normal. No tenderness.  Lumbar back: Normal. No tenderness.  Skin/wounds:  Warm, well perfused, y shaped laceration to left forehead  Psych: Normal mood and affect.    Labs:  Reviewed. Unremarkable.     Imaging:  Reviewed. Chronic findings. No acute findings.    Assessment & Plan:   No acute findings requiring admission. We will repair head laceration. Will attempt to place absorbable but if not able, we will schedule for 2-3 weeks in out clinic for removal. Dispo per ER.

## 2024-01-31 NOTE — ED PROVIDER NOTES
Encounter Date: 1/31/2024    SCRIBE #1 NOTE: I, Christiano Larkin, am scribing for, and in the presence of,  Tyler Saavedra MD. I have scribed the following portions of the note - Other sections scribed: HPI,ROS,PE.       History   Chief Complaint: Fall    89 y/o male presents to ED via EMS following a fall. EMS reports pt was trying to walk with his walker, it gave out on him, and he fell. EMS reports pt hit his head, and he is on plavix. EMS denies LOC. 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 Pt complains of head pain. He denies neck or back pain.     The history is provided by the patient and the EMS personnel. No  was used.   Trauma  This is a new problem. The current episode started 1 to 2 hours ago. Pertinent negatives include no chest pain, no abdominal pain and no shortness of breath.     Review of patient's allergies indicates:  No Known Allergies  Past Medical History:   Diagnosis Date    PVD (peripheral vascular disease)      History reviewed. No pertinent surgical history.  History reviewed. No pertinent family history.     Review of Systems   Constitutional:  Negative for fever.   HENT:  Negative for sore throat.         Head pain.    Eyes:  Negative for visual disturbance.   Respiratory:  Negative for shortness of breath.    Cardiovascular:  Negative for chest pain.   Gastrointestinal:  Negative for abdominal pain.   Genitourinary:  Negative for dysuria.   Musculoskeletal:  Negative for back pain, joint swelling and neck pain.   Skin:  Negative for rash.   Neurological:  Negative for weakness.   Psychiatric/Behavioral:  Negative for confusion.    All other systems reviewed and are negative.      Physical Exam     Initial Vitals   BP Pulse Resp Temp SpO2   01/31/24 1136 01/31/24 1136 01/31/24 1136 01/31/24 1136 01/31/24 1122   137/81 76 18 98.2 °F (36.8 °C) (!) 91 %      MAP       --                Physical Exam    Nursing note and vitals reviewed.  Constitutional:  He appears well-developed and well-nourished. He is not diaphoretic. No distress.   HENT:   Head: Normocephalic.   Stellate laceration to the left scalp approximately 3 x 3 cm in size. No other abrasions, contusions, lacerations to the scalp or face.  No superior inferior orbital ridge tenderness to palpation.  No zygomatic arch tenderness to palpation.  No epistaxis.  No CSF rhinorrhea.  No septal hematoma.  No intraoral injuries noted.  Normal external ear.  No raccoon eyes.  No Carrasco sign.     Eyes: Conjunctivae and EOM are normal. Pupils are equal, round, and reactive to light.   Pupils 3mm-2mm and reactive to light.    Neck:   Normal range of motion.  Cardiovascular:  Normal rate, regular rhythm, normal heart sounds and intact distal pulses.           No murmur heard.  Pulses:       Radial pulses are 2+ on the right side and 2+ on the left side.   Dopplerable DP pulses    Pulmonary/Chest: Breath sounds normal. No respiratory distress. He has no wheezes. He has no rales. He exhibits tenderness.   Abdominal: Abdomen is soft. He exhibits no distension. There is no abdominal tenderness.   Musculoskeletal:         General: Edema (2+ BLE edema) present. No tenderness (no C,T,L tenderness). Normal range of motion.      Cervical back: Normal range of motion.      Comments: No C,T or L-spine vertebral point tenderness to palpation, no step-offs, no deformities.  Right upper extremity:  Full range of motion of shoulder, elbow, wrist, no deformity or tenderness to palpation.  Left upper extremity: Full range of motion of elbow, wrist, no deformity or tenderness to palpation. Skin tear to L elbow.   Right lower extremity:  Full range of motion of hip, knee, ankle, no tenderness palpation or deformity noted.  Left lower extremity:  Full range of motion of hip, knee, ankle, no tenderness palpation or deformity noted.       Neurological: He is alert and oriented to person, place, and time. No cranial nerve deficit. GCS score  is 15. GCS eye subscore is 4. GCS verbal subscore is 5. GCS motor subscore is 6.   Skin: Skin is warm and dry. Capillary refill takes less than 2 seconds. No rash noted. No erythema.   Psychiatric: He has a normal mood and affect.         ED Course   Procedures  Labs Reviewed   COMPREHENSIVE METABOLIC PANEL - Abnormal; Notable for the following components:       Result Value    Glucose Level 124 (*)     Blood Urea Nitrogen 30.7 (*)     Albumin/Globulin Ratio 1.0 (*)     Aspartate Aminotransferase 40 (*)     All other components within normal limits   CBC WITH DIFFERENTIAL - Abnormal; Notable for the following components:    MCHC 31.3 (*)     IG# 0.06 (*)     All other components within normal limits   PROTIME-INR - Normal   APTT - Normal   CBC W/ AUTO DIFFERENTIAL    Narrative:     The following orders were created for panel order CBC auto differential.  Procedure                               Abnormality         Status                     ---------                               -----------         ------                     CBC with Differential[[id number]]       Abnormal            Final result                 Please view results for these tests on the individual orders.   TYPE & SCREEN   ABORH RETYPE          Imaging Results              CT Chest Abdomen Pelvis With IV Contrast (XPD) Routine Oral Contrast (Final result)  Result time 01/31/24 17:34:33      Final result by Ameya Stinson MD (01/31/24 17:34:33)                   Impression:      Comminuted distal left clavicle fracture.  Age indeterminate mild compression deformities of the T11 and T12 vertebral bodies.      Electronically signed by: Ameya Stinson  Date:    01/31/2024  Time:    17:34               Narrative:    EXAMINATION:  CT CHEST ABDOMEN PELVIS WITH IV CONTRAST (XPD)    CLINICAL HISTORY:  Polytrauma, blunt;    TECHNIQUE:  CT imaging of the chest, abdomen and pelvis after IV contrast. Axial, coronal and sagittal reformatted images reviewed.  Dose length product is 2405 mGycm. Automatic exposure control, adjustment of mA/kV or iterative reconstruction technique used to limit radiation dose.    COMPARISON:  No relevant comparison studies available at the time of dictation.    FINDINGS:  No mediastinal hematoma or significant pleural/pericardial fluid.  Mild bilateral dependent atelectasis.  No pleural effusion.    No defined hepatic or splenic laceration.  Normal pancreas and adrenal glands.  No acute renal findings.  Few small bladder diverticula.  Mildly enlarged prostate.  No mesenteric hematoma, pneumoperitoneum or ascites.  Bilateral iliac venous stents.    Comminuted, mildly displaced fracture of the distal left clavicle.  Age-indeterminate mild compression deformities of the T11 and T12 vertebral bodies.  Additional compression deformities of the L1 through L3 vertebral bodies appear chronic.  No significant retropulsion.                                       X-Ray Shoulder Trauma Left (Final result)  Result time 01/31/24 15:31:46      Final result by Jorden Horton MD (01/31/24 15:31:46)                   Impression:      1.  Fracture distal left clavicle of indeterminate age.    2.  Recently acquired appearing fractures of the left posterior 4th and 5th ribs.      Electronically signed by: Jorden Horton  Date:    01/31/2024  Time:    15:31               Narrative:    EXAMINATION:  Left shoulder.    CLINICAL HISTORY:  Trauma.    TECHNIQUE:  Three views.    COMPARISON:  None available.    FINDINGS:  There is fracture deformity involving the distal left clavicle of indeterminate age with somewhat corticated margins.  There is heterogeneous sclerosis of the most distal portion of the clavicle.  There is no separation of the acromioclavicular joint.  No acute fracture or dislocation about the glenohumeral articulation.  There are fracture deformities of the left posterior 4th and 5th ribs with slight displacement.                                        X-Ray Chest 1 View (Final result)  Result time 01/31/24 12:09:41      Final result by Gordon Tapia MD (01/31/24 12:09:41)                   Impression:      Poor inspiratory effort.    Confluent opacities in the left base and left retrocardiac region with partial silhouetting of the left hemidiaphragm which might be related to an infiltrate/atelectasis.    Left-sided rib fractures      Electronically signed by: Gordon Tapia  Date:    01/31/2024  Time:    12:09               Narrative:    EXAMINATION:  XR CHEST 1 VIEW    CPT 00940    CLINICAL HISTORY:  r/o bleeding or hemorrhage;    FINDINGS:  Examination reveals mediastinal silhouette to be within normal limits cardiac silhouette is not enlarged this is a poor inspiratory effort that accentuates the pulmonary vascular markings.    Slightly more confluent opacities identified in the left retrocardiac region with partial silhouetting of the left hemidiaphragm which might be related to an infiltrate/atelectasis.    No other focal consolidative changes.    Some left-sided rib fractures are identified specially the 4th and perhaps 5th left posterior ribs                                       X-Ray Pelvis Routine AP (Final result)  Result time 01/31/24 12:28:28      Final result by Jonathon Gordon MD (01/31/24 12:28:28)                   Impression:      Degenerative changes with no acute fracture appreciated.      Electronically signed by: Jonathon Gordon  Date:    01/31/2024  Time:    12:28               Narrative:    EXAMINATION:  XR PELVIS ROUTINE AP    CLINICAL HISTORY:  r/o bleeding or hemorrhage;    TECHNIQUE:  Single view of the pelvis.    COMPARISON:  No prior imaging available for comparison    FINDINGS:  Venous stents are present.  Degenerative changes with bilateral acetabular osteophytes.  No acute fracture identified.                                       CT Cervical Spine Without Contrast (Final result)  Result time 01/31/24 12:21:36       Final result by Jorden Horton MD (01/31/24 12:21:36)                   Impression:      No acute fracture or malalignment identified.      Electronically signed by: Jorden Horton  Date:    01/31/2024  Time:    12:21               Narrative:    EXAMINATION:  CT CERVICAL SPINE WITHOUT CONTRAST    CLINICAL HISTORY:  Trauma.    TECHNIQUE:  Multidetector axial images were performed of the cervical spine without and.  Images were reconstructed.    Automated exposure control was utilized to minimize radiation dose.  DLP 1492.    COMPARISON:  None available.    FINDINGS:  There is grade 1 anterolisthesis of C3 on C4 and C4 on C5.  Cervical vertebrae stature is preserved.  No acute fracture or malalignment identified.  There are multilevel degenerative changes which cause narrowings of the neural foramen.  There is calcification of the nuchal ligament at the level of C5 and C6.  There is no prevertebral soft tissue prominence.    This study does not exclude the possibility of intrathecal soft tissue, ligamentous or vascular injury.                                       CT Head Without Contrast (Final result)  Result time 01/31/24 12:09:03      Final result by Jorden Horton MD (01/31/24 12:09:03)                   Impression:      1.  No acute intracranial findings identified.    2.  Left frontal scalp large hemorrhagic inflammation..      Electronically signed by: Jorden Horton  Date:    01/31/2024  Time:    12:09               Narrative:    EXAMINATION:  CT HEAD WITHOUT CONTRAST    CLINICAL HISTORY:  Trauma;    TECHNIQUE:  Sequential axial images were performed of the brain without contrast.    Dose product length of 1492 mGycm. Automated exposure control was utilized to minimize radiation dose.    COMPARISON:  None available1.    FINDINGS:  There is left frontal scalp large hyperdense hemorrhagic inflammation.  No acute depressed skull fracture.  There is no intracranial mass effect, midline shift, hydrocephalus or  hemorrhage. There is no sulcal effacement or low attenuation changes to suggest recent large vessel territory infarction. Chronic appearing periventricular and subcortical white matter low attenuation changes are present and are consistent with chronic microangiopathic ischemia. The ventricular system and sulcal markings prominence is consistent with atrophy. There is no acute extra axial fluid collection. Visualized paranasal sinuses are clear without mucosal thickening, polypoidal abnormality or air-fluid levels. Mastoid air cells aeration is optimal.                                    X-Rays:   Independently Interpreted Readings:   Chest X-Ray: No pneumothorax     Medications   Tdap (BOOSTRIX) vaccine injection 0.5 mL (0.5 mLs Intramuscular Given 1/31/24 1142)   ceFAZolin injection 2 g ( Intravenous Canceled Entry 1/31/24 1200)   LIDOcaine-EPINEPHrine (PF) 2%-1:200,000 injection 1 mL (1 mL Other Given by Provider 1/31/24 1345)   silver nitrate applicators applicator 1 applicator (1 applicator Topical (Top) Given by Provider 1/31/24 1400)   iopamidoL (ISOVUE-370) injection 100 mL (100 mLs Intravenous Given 1/31/24 1556)             Scribe Attestation:   Scribe #1: I performed the above scribed service and the documentation accurately describes the services I performed. I attest to the accuracy of the note.    Attending Attestation:           Physician Attestation for Scribe:  Physician Attestation Statement for Scribe #1: I, Tyler Saavedra MD, reviewed documentation, as scribed by Christiano Larkin in my presence, and it is both accurate and complete.         Medical Decision Making  Problems Addressed:  Closed displaced fracture of left clavicle, unspecified part of clavicle, initial encounter: acute illness or injury that poses a threat to life or bodily functions  Closed fracture of multiple ribs of left side, initial encounter: acute illness or injury that poses a threat to life or bodily functions  Compression  fracture of body of thoracic vertebra: acute illness or injury that poses a threat to life or bodily functions  Fall, initial encounter: acute illness or injury that poses a threat to life or bodily functions  Laceration of scalp, initial encounter: acute illness or injury that poses a threat to life or bodily functions  Pain: acute illness or injury that poses a threat to life or bodily functions    Amount and/or Complexity of Data Reviewed  Independent Historian: EMS     Details:  EMS reports pt was trying to walk with his walker, it gave out on him, and he fell. EMS reports pt hit his head, and he is on plavix. EMS denies LOC. 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  External Data Reviewed: notes.     Details: Reviewed old records history peripheral vascular disease  Labs: ordered.  Radiology: ordered and independent interpretation performed.    Risk  Prescription drug management.  Parenteral controlled substances.  Decision regarding hospitalization.  Diagnosis or treatment significantly limited by social determinants of health.            ED Course as of 02/19/24 1203   Wed Jan 31, 2024   1747 Paged trauma surgery  [JAYY]   1748 Call and consult with trauma surgery  []      ED Course User Index  [JAYY] Christiano Larkin               Medical Decision Making:   History:   I obtained history from: EMS provider and someone other than patient.       <> Summary of History:  EMS reports pt was trying to walk with his walker, it gave out on him, and he fell. EMS reports pt hit his head, and he is on plavix. EMS denies LOC. 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  Old Medical Records: I decided to obtain old medical records.  Old Records Summarized: records from previous admission(s), records from clinic visits and records from another hospital.       <> Summary of Records: Reviewed old records history of PVD  Initial Assessment:   Trauma  Differential Diagnosis:   Judging by the  patient's chief complaint and pertinent history, the patient has the following possible differential diagnoses, including but not limited to the following.  Some of these are deemed to be lower likelihood and some more likely based on my physical exam and history combined with possible lab work and/or imaging studies.   Please see the pertinent studies, and refer to the HPI.  Some of these diagnoses will take further evaluation to fully rule out, perhaps as an outpatient and the patient was encouraged to follow up when discharged for more comprehensive evaluation.      abrasion, contusion, fracture, traumatic ICH, TBI, concussion, spinal injury, fracture, pneumothorax, hemothorax, intrathoracic injury, intraabdominal injury, hemorrhage, laceration     Clinical Tests:   Lab Tests: Reviewed and Ordered  Radiological Study: Reviewed and Ordered  ED Management:  Patient is a 88-year-old male presents to emergency department for mechanical fall, see HPI.  See physical exam.  Activated as a trauma alert due to being on Plavix.  Airway intact equal breath sounds, circulation intact.  Laceration of the scalp repaired by Trauma surgery resident.  Tetanus has been updated.  Patient found to have rib fractures, age-indeterminate compression fractures, left clavicle fracture.  Discussed with Trauma surgery who will admit the patient.  All results discussed with patient and family.  Answered all questions at this time.  Verbalized understanding agreed to plan.  Other:   I have discussed this case with another health care provider.       <> Summary of the Discussion: Discussed case with Trauma surgery Angela who will evaluate admit the patient.             Clinical Impression:  Final diagnoses:  [W19.XXXA] Fall, initial encounter (Primary)  [S01.01XA] Laceration of scalp, initial encounter  [R52] Pain  [S22.42XA] Closed fracture of multiple ribs of left side, initial encounter  [S22.000A] Compression fracture of body of thoracic  vertebra  [S42.002A] Closed displaced fracture of left clavicle, unspecified part of clavicle, initial encounter          ED Disposition Condition    Admit Stable                Tyler Saavedra MD  02/19/24 1204

## 2024-02-01 LAB
ALBUMIN SERPL-MCNC: 3 G/DL (ref 3.4–4.8)
ALBUMIN/GLOB SERPL: 1.1 RATIO (ref 1.1–2)
ALP SERPL-CCNC: 37 UNIT/L (ref 40–150)
ALT SERPL-CCNC: 22 UNIT/L (ref 0–55)
AST SERPL-CCNC: 25 UNIT/L (ref 5–34)
BASOPHILS # BLD AUTO: 0.03 X10(3)/MCL
BASOPHILS NFR BLD AUTO: 0.4 %
BILIRUB SERPL-MCNC: 0.5 MG/DL
BUN SERPL-MCNC: 29 MG/DL (ref 8.4–25.7)
CALCIUM SERPL-MCNC: 8.9 MG/DL (ref 8.8–10)
CHLORIDE SERPL-SCNC: 109 MMOL/L (ref 98–107)
CO2 SERPL-SCNC: 26 MMOL/L (ref 23–31)
CREAT SERPL-MCNC: 0.79 MG/DL (ref 0.73–1.18)
CRP SERPL-MCNC: 34.2 MG/L
EOSINOPHIL # BLD AUTO: 0.04 X10(3)/MCL (ref 0–0.9)
EOSINOPHIL NFR BLD AUTO: 0.5 %
ERYTHROCYTE [DISTWIDTH] IN BLOOD BY AUTOMATED COUNT: 15.2 % (ref 11.5–17)
GFR SERPLBLD CREATININE-BSD FMLA CKD-EPI: >60 MLS/MIN/1.73/M2
GLOBULIN SER-MCNC: 2.7 GM/DL (ref 2.4–3.5)
GLUCOSE SERPL-MCNC: 105 MG/DL (ref 82–115)
HCT VFR BLD AUTO: 38.9 % (ref 42–52)
HGB BLD-MCNC: 12.6 G/DL (ref 14–18)
IMM GRANULOCYTES # BLD AUTO: 0.02 X10(3)/MCL (ref 0–0.04)
IMM GRANULOCYTES NFR BLD AUTO: 0.3 %
LYMPHOCYTES # BLD AUTO: 1.47 X10(3)/MCL (ref 0.6–4.6)
LYMPHOCYTES NFR BLD AUTO: 19.6 %
MAGNESIUM SERPL-MCNC: 2 MG/DL (ref 1.6–2.6)
MCH RBC QN AUTO: 30 PG (ref 27–31)
MCHC RBC AUTO-ENTMCNC: 32.4 G/DL (ref 33–36)
MCV RBC AUTO: 92.6 FL (ref 80–94)
MONOCYTES # BLD AUTO: 0.84 X10(3)/MCL (ref 0.1–1.3)
MONOCYTES NFR BLD AUTO: 11.2 %
NEUTROPHILS # BLD AUTO: 5.1 X10(3)/MCL (ref 2.1–9.2)
NEUTROPHILS NFR BLD AUTO: 68 %
NRBC BLD AUTO-RTO: 0 %
PHOSPHATE SERPL-MCNC: 3.6 MG/DL (ref 2.3–4.7)
PLATELET # BLD AUTO: 178 X10(3)/MCL (ref 130–400)
PMV BLD AUTO: 10.4 FL (ref 7.4–10.4)
POTASSIUM SERPL-SCNC: 4.3 MMOL/L (ref 3.5–5.1)
PREALB SERPL-MCNC: 14.4 MG/DL (ref 16–42)
PROT SERPL-MCNC: 5.7 GM/DL (ref 5.8–7.6)
RBC # BLD AUTO: 4.2 X10(6)/MCL (ref 4.7–6.1)
SODIUM SERPL-SCNC: 141 MMOL/L (ref 136–145)
WBC # SPEC AUTO: 7.5 X10(3)/MCL (ref 4.5–11.5)

## 2024-02-01 PROCEDURE — 86140 C-REACTIVE PROTEIN: CPT

## 2024-02-01 PROCEDURE — 83735 ASSAY OF MAGNESIUM: CPT

## 2024-02-01 PROCEDURE — 97166 OT EVAL MOD COMPLEX 45 MIN: CPT

## 2024-02-01 PROCEDURE — 80053 COMPREHEN METABOLIC PANEL: CPT

## 2024-02-01 PROCEDURE — 99223 1ST HOSP IP/OBS HIGH 75: CPT | Mod: 57,,, | Performed by: ORTHOPAEDIC SURGERY

## 2024-02-01 PROCEDURE — 25000003 PHARM REV CODE 250

## 2024-02-01 PROCEDURE — 11000001 HC ACUTE MED/SURG PRIVATE ROOM

## 2024-02-01 PROCEDURE — 85025 COMPLETE CBC W/AUTO DIFF WBC: CPT

## 2024-02-01 PROCEDURE — 23500 CLTX CLAVICULAR FX W/O MNPJ: CPT | Mod: LT,,, | Performed by: ORTHOPAEDIC SURGERY

## 2024-02-01 PROCEDURE — 63600175 PHARM REV CODE 636 W HCPCS

## 2024-02-01 PROCEDURE — 63600175 PHARM REV CODE 636 W HCPCS: Performed by: NURSE PRACTITIONER

## 2024-02-01 PROCEDURE — 84100 ASSAY OF PHOSPHORUS: CPT

## 2024-02-01 PROCEDURE — 84134 ASSAY OF PREALBUMIN: CPT

## 2024-02-01 PROCEDURE — 97162 PT EVAL MOD COMPLEX 30 MIN: CPT

## 2024-02-01 RX ORDER — ENOXAPARIN SODIUM 100 MG/ML
40 INJECTION SUBCUTANEOUS EVERY 12 HOURS
Status: DISCONTINUED | OUTPATIENT
Start: 2024-02-01 | End: 2024-02-02 | Stop reason: HOSPADM

## 2024-02-01 RX ADMIN — SODIUM CHLORIDE, POTASSIUM CHLORIDE, SODIUM LACTATE AND CALCIUM CHLORIDE: 600; 310; 30; 20 INJECTION, SOLUTION INTRAVENOUS at 02:02

## 2024-02-01 RX ADMIN — ACETAMINOPHEN 325MG 650 MG: 325 TABLET ORAL at 08:02

## 2024-02-01 RX ADMIN — ACETAMINOPHEN 325MG 650 MG: 325 TABLET ORAL at 09:02

## 2024-02-01 RX ADMIN — ACETAMINOPHEN 325MG 650 MG: 325 TABLET ORAL at 06:02

## 2024-02-01 RX ADMIN — ENOXAPARIN SODIUM 40 MG: 40 INJECTION SUBCUTANEOUS at 08:02

## 2024-02-01 RX ADMIN — DOCUSATE SODIUM 100 MG: 100 CAPSULE, LIQUID FILLED ORAL at 09:02

## 2024-02-01 RX ADMIN — DOCUSATE SODIUM 100 MG: 100 CAPSULE, LIQUID FILLED ORAL at 08:02

## 2024-02-01 RX ADMIN — METHOCARBAMOL 500 MG: 500 TABLET ORAL at 06:02

## 2024-02-01 RX ADMIN — Medication 6 MG: at 08:02

## 2024-02-01 RX ADMIN — POLYETHYLENE GLYCOL 3350 17 G: 17 POWDER, FOR SOLUTION ORAL at 08:02

## 2024-02-01 RX ADMIN — POLYETHYLENE GLYCOL 3350 17 G: 17 POWDER, FOR SOLUTION ORAL at 09:02

## 2024-02-01 RX ADMIN — METHOCARBAMOL 500 MG: 500 TABLET ORAL at 08:02

## 2024-02-01 NOTE — PROGRESS NOTES
*full consult to follow    status post fall on blood thinners. Scalp laceration and rib fractures noted. Orthopedics consulted for left distal clavicle fracture. Could possibly be an old injury based on appearance of the fracture on x-ray. No operative intervention indicated. Sling for comfort. OK for waist level activities and ADLs, no overhead activities. Call with questions.       Sanya Quiroz MD  [facility]

## 2024-02-01 NOTE — PROGRESS NOTES
[facility] - 9th Floor Med Surg  Wound Care    Patient Name:  Donovan Enriquez Jr.   MRN:  [id number]  Date: 2/1/2024  Diagnosis: Closed fracture of multiple ribs of left side    History:     No past medical history on file.    Social History     Socioeconomic History    Marital status:        Precautions:     Allergies as of 01/31/2024    (No Known Allergies)       [facility] Assessment Details/Treatment     Initial visit regarding multiple affected areas of skin , noting chronic venous stasis type ulcer to right LE, for which patient reports he is followed by wound clinic MD , Dr. Guo [facility] and has an appointment today, contacted them and they report they will reschedule with pateint after discharge, informed patient of same. Other areas incurred with fall , all areas cleansed  and assessed, and RLE cleansed and assessed and redressed. Pateint tolerated procedure well.        02/01/24 0830        Altered Skin Integrity 01/31/24 1136 Left posterior Elbow Skin Tear   Date First Assessed/Time First Assessed: 01/31/24 1136   Side: Left  Orientation: posterior  Location: Elbow  Primary Wound Type: Skin Tear   Wound Image    Dressing Appearance Open to air   Drainage Amount None   Appearance Intact   Periwound Area Ecchymotic   Wound Edges Approximated   Care Antimicrobial agent   Dressing Other (comment)  (jourdan)        Altered Skin Integrity 02/01/24 0002 Left Scalp   Date First Assessed/Time First Assessed: 02/01/24 0002   Altered Skin Integrity Present on Admission - Did Patient arrive to the hospital with altered skin?: yes  Side: Left  Location: Scalp   Wound Image    Dressing Appearance Open to air   Drainage Amount Scant   Drainage Characteristics/Odor Serosanguineous   Periwound Area Ecchymotic;Edematous   Care Antimicrobial agent   Dressing   (jourdan)        Altered Skin Integrity 02/01/24 0003 Right anterior;lower Leg   Date First Assessed/Time First Assessed: 02/01/24 0003   Altered Skin Integrity  Present on Admission - Did Patient arrive to the hospital with altered skin?: yes  Side: Right  Orientation: anterior;lower  Location: Leg   Wound Image    Dressing Appearance Moist drainage   Drainage Amount Small   Drainage Characteristics/Odor Serosanguineous   Appearance Pink;Red  (suggestive of venous stasis ulcer)   Tissue loss description Full thickness   Red (%), Wound Tissue Color 50 %   Wound Edges Jagged;Defined   Wound Length (cm) 4 cm   Wound Width (cm) 3 cm   Wound Surface Area (cm^2) 12 cm^2   Care Antimicrobial agent   Dressing Changed;Foam   Periwound Care Dry periwound area maintained   Dressing Change Due 02/02/24        Altered Skin Integrity 02/01/24 0004 Left anterior Foot   Date First Assessed/Time First Assessed: 02/01/24 0004   Altered Skin Integrity Present on Admission - Did Patient arrive to the hospital with altered skin?: yes  Side: Left  Orientation: anterior  Location: Foot   Wound Image    Dressing Appearance Open to air   Drainage Amount None   Appearance Maroon   Tissue loss description Partial thickness  (blister , patient reports from trauma)   Periwound Area Intact   Wound Edges Defined   Wound Length (cm) 1 cm   Wound Width (cm) 1 cm   Wound Surface Area (cm^2) 1 cm^2   Care Antimicrobial agent        Altered Skin Integrity 02/01/24 0005 Right anterior Foot   Date First Assessed/Time First Assessed: 02/01/24 0005   Altered Skin Integrity Present on Admission - Did Patient arrive to the hospital with altered skin?: yes  Side: Right  Orientation: anterior  Location: Foot   Wound Image    Appearance   (bruising)         Recommendations made to primary team for local wound care measures  . Orders placed.     02/01/2024

## 2024-02-01 NOTE — CONSULTS
[facility]                       [address] [id number]    PATIENT NAME:       JUAN DAVILA  YOB: 1935  CSN:                [id number]   MRN:                [id number]  ADMIT DATE:         01/31/2024 11:17:00  PHYSICIAN:          Sanya Quiroz MD                            CONSULTATION    DATE OF CONSULT:  02/01/2024 00:00:00    CONSULTATION DIAGNOSIS:  Left distal clavicle fracture.    CHIEF COMPLAINT:  Left shoulder pain as well as scalp pain.    HISTORY OF PRESENT ILLNESS:  The patient is an 88-year-old male who had a fall   at home.  He was a level 2 trauma activation.  He has scalp laceration, left 4th   and 5th rib fractures.  His scalp laceration was repaired.  He was admitted to   the trauma team.  Orthopedics was consulted due to his clavicle fracture.  Upon   my evaluation at the bedside, he is resting comfortably.  States that his pain   is controlled at rest.  He states that he has nerve damage due to a prior back   and neck surgery that has left him weak and not fully functional with the left   upper extremity and left lower extremity.  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    REVIEW OF SYSTEMS:  All reported negative aside from HPI  Constitutional: Negative for chills and fever.   HENT: Negative for congestion and hearing loss.    Eyes: Negative for visual disturbance.   Cardiovascular: Negative for chest pain and syncope.   Respiratory: Negative for cough and shortness of breath.    Hematologic/Lymphatic: Does not bruise/bleed easily.   Skin: Negative for color change and rash.   Gastrointestinal: Negative for abdominal pain, nausea and vomiting.   Genitourinary: Negative for dysuria and hematuria.   Neurological: Negative for numbness, sensory change and weakness.   Psychiatric/Behavioral:  Negative for altered mental status.       PAST MEDICAL HISTORY:  Hypertension, neuropathy, peripheral vascular disease,   lymphedema, coronary artery disease, asthma.    SOCIAL HISTORY:  Denies alcohol, tobacco, or drug use.    PAST SURGICAL HISTORY:  Denies.    PHYSICAL EXAMINATION:  GENERAL:  He is in no apparent distress.  He is awake and alert.  He answers   questions appropriately.  He is sitting up in bed.   HEAD, EYES, EARS, NOSE, THROAT:  Extraocular movements are intact.  He has a   scalp laceration that is well repaired with no hematoma and no drainage.  It is   open to air.  He has good range of motion of the cervical spine without   tenderness. PULMONARY:  Unlabored respirations.  Symmetric chest rise.  GI:  His abdomen is soft, nontender, nondistended.  INTEGUMENTARY SYSTEM:  His skin is warm, dry, and intact.  CARDIOVASCULAR:  Normal rate with regular rhythm.  He has good peripheral   perfusion.   MUSCULOSKELETAL EVALUATION:  On evaluation of his left upper extremity, he has   some swelling over the anterior and lateral aspects of the shoulder.  No painful   or prominent fracture fragments are noted.  He has no skin tenting or proximal   displacement of the distal clavicle.  He tolerates gentle passive circumduction   of the shoulder without pain.  He has limitations in functional range of motion   of the elbow, forearm, wrist and digits due to contractures preexisting his   fall.    IMAGING:  X-ray evaluations reveal a nondisplaced left distal clavicle fracture   as well as T-spine compression fractures.    PLAN:  For his clavicle, will be nonoperative management.  He can perform waist   level activities.  No overhead activities.  He can weightbear for transfers in   ADLs, use a sling for comfort.  He is okay for discharge from an orthopedic   standpoint with followup as an outpatient in 1 month for repeat x-rays of the   left clavicle.  The patient understands and agrees with all of that we have    discussed today and all questions and concerns were addressed.        ______________________________  MD DONNELL Rainey/RANDY  DD:  02/01/2024  Time:  10:30AM  DT:  02/01/2024  Time:  11:17AM  Job #:  [id number]      CONSULTATION

## 2024-02-01 NOTE — NURSING
Nurses Note -- 4 Eyes      2/1/2024   12:00 AM      Skin assessed during: Admit      [] No Altered Skin Integrity Present    []Prevention Measures Documented      [x] Yes- Altered Skin Integrity Present or Discovered   [x] LDA Added if Not in Epic (Describe Wound)   [x] New Altered Skin Integrity was Present on Admit and Documented in LDA   [x] Wound Image Taken    Wound Care Consulted? Yes    Attending Nurse:  Gerda Gonzalez RN/Staff Member:  Derek Verdugo

## 2024-02-01 NOTE — PROGRESS NOTES
"   Trauma Surgery   Progress Note  Admit Date: 1/31/2024  HD#1  POD#* No surgery found *    Subjective:   Interval history:  Afebrile, vital signs stable satting 93% on 2 L nasal cannula. Patient without pain, feeling comfortable.     Home Meds:   Current Outpatient Medications   Medication Instructions    aspirin (ECOTRIN) 81 mg, Oral, Daily    budesonide-formoterol 160-4.5 mcg (SYMBICORT) 160-4.5 mcg/actuation HFAA 2 puffs, Inhalation, Every 12 hours, Controller    cetirizine (ZYRTEC) 10 mg, Oral, Daily    clopidogreL (PLAVIX) 75 mg, Oral, Daily    cyanocobalamin (VITAMIN B-12) 100 mcg, Oral, Daily    dupilumab (DUPIXENT PEN) 300 mg/2 mL PnIj Subcutaneous    glucosamine-chondroitin (OSTEO BI-FLEX) 250-200 mg Tab Oral    multivitamin (THERAGRAN) per tablet 1 tablet, Oral, Daily    pantoprazole (PROTONIX) 40 mg, Oral, Daily    pyridoxine (vitamin B6) (VITAMIN B-6) 50 mg, Oral, Daily    rosuvastatin (CRESTOR) 20 mg, Oral, Daily    valsartan (DIOVAN) 320 mg, Oral, Daily    vitamin D (VITAMIN D3) 2,000 Units, Oral, Daily      Scheduled Meds:   acetaminophen  650 mg Oral Q4H    docusate sodium  100 mg Oral BID    methocarbamoL  500 mg Oral Q8H    polyethylene glycol  17 g Oral BID     Continuous Infusions:   lactated ringers 75 mL/hr at 01/31/24 2349     PRN Meds:magnesium hydroxide 400 mg/5 ml, melatonin, oxyCODONE     Objective:     VITAL SIGNS: 24 HR MIN & MAX LAST   Temp  Min: 97.9 °F (36.6 °C)  Max: 98.8 °F (37.1 °C)  98.8 °F (37.1 °C)   BP  Min: 103/62  Max: 150/79  103/62    Pulse  Min: 68  Max: 80  78    Resp  Min: 14  Max: 23  18    SpO2  Min: 91 %  Max: 100 %  (!) 93 %      HT: 6' 2" (188 cm)  WT: 81.6 kg (179 lb 14.3 oz)  BMI: 23.1     Intake/output:  Intake/Output - Last 3 Shifts       None          No intake or output data in the 24 hours ending 02/01/24 0646      Lines/drains/airway:       Peripheral IV - Single Lumen 01/31/24 1135 18 G Anterior;Distal;Right Upper Arm (Active)   Site Assessment " "Clean;Dry;Intact 02/01/24 0600   Line Status Infusing 02/01/24 0600   Dressing Status Clean;Dry;Intact 02/01/24 0600   Dressing Intervention Integrity maintained 02/01/24 0600   Number of days: 0       Physical examination:  Gen: NAD, AAOx3, answering questions appropriately  HEENT: NC/AT, laceration to left temporal scalp status post closure.   CV: RR, on nasal cannula  Resp: NWOB  Abd: S/NT/ND  Msk: moving all extremities spontaneously and purposefully  Neuro: CN II-XII grossly intact  Skin/wounds: laceration as above.     Labs:  Renal:  Recent Labs     01/31/24  1146 02/01/24  0553   BUN 30.7* 29.0*   CREATININE 0.78 0.79     No results for input(s): "LACTIC" in the last 72 hours.  FEN/GI:  Recent Labs     01/31/24  1146 02/01/24  0553    141   K 4.6 4.3   CO2 25 26   CALCIUM 9.4 8.9   MG  --  2.00   PHOS  --  3.6   ALBUMIN 3.4 3.0*   BILITOT 0.5 0.5   AST 40* 25   ALKPHOS 47 37*   ALT 32 22     Heme:  Recent Labs     01/31/24  1146 02/01/24  0553   HGB 14.9 12.6*   HCT 47.6 38.9*    178   PTT 30.1  --    INR 1.1  --      ID:  Recent Labs     01/31/24  1146 02/01/24  0553   WBC 8.76 7.50     CBG:  Recent Labs     01/31/24  1146 02/01/24  0553   GLUCOSE 124* 105      No results for input(s): "POCTGLUCOSE" in the last 72 hours.   Cardiovascular:  No results for input(s): "TROPONINI", "CKTOTAL", "CKMB", "BNP" in the last 168 hours.  I have reviewed all pertinent lab results within the past 24 hours.    Imaging:  CT Chest Abdomen Pelvis With IV Contrast (XPD) Routine Oral Contrast   Final Result      Comminuted distal left clavicle fracture.  Age indeterminate mild compression deformities of the T11 and T12 vertebral bodies.         Electronically signed by: Ameya Stinson   Date:    01/31/2024   Time:    17:34      X-Ray Shoulder Trauma Left   Final Result      1.  Fracture distal left clavicle of indeterminate age.      2.  Recently acquired appearing fractures of the left posterior 4th and 5th ribs.    "      Electronically signed by: Jorden Horton   Date:    01/31/2024   Time:    15:31      X-Ray Chest 1 View   Final Result      Poor inspiratory effort.      Confluent opacities in the left base and left retrocardiac region with partial silhouetting of the left hemidiaphragm which might be related to an infiltrate/atelectasis.      Left-sided rib fractures         Electronically signed by: Gordon Tapia   Date:    01/31/2024   Time:    12:09      X-Ray Pelvis Routine AP   Final Result      Degenerative changes with no acute fracture appreciated.         Electronically signed by: Jonathon Gordon   Date:    01/31/2024   Time:    12:28      CT Cervical Spine Without Contrast   Final Result      No acute fracture or malalignment identified.         Electronically signed by: Jorden Horton   Date:    01/31/2024   Time:    12:21      CT Head Without Contrast   Final Result      1.  No acute intracranial findings identified.      2.  Left frontal scalp large hemorrhagic inflammation..         Electronically signed by: Jorden Horton   Date:    01/31/2024   Time:    12:09         I have reviewed all pertinent imaging results/findings within the past 24 hours.    Micro/Path/Other:  Microbiology Results (last 7 days)       ** No results found for the last 168 hours. **           Specimen (168h ago, onward)      None             Problems list:  Active Problem List with Overview Notes    Diagnosis Date Noted    Closed fracture of acromial end of left clavicle 01/31/2024    Closed fracture of multiple ribs of left side 01/31/2024    Compression fracture of T11 vertebra 01/31/2024    Fall 01/31/2024        Assessment & Plan:   Patient is a 88 year old male who will be admitted for evaluation of rib fractures, clavicle fracture and T-spine compression fractures s/p fall. He has been unable to ambulate with walker in the ED since fall s/t pain.     Left 4th & 5th rib fractures  - Frequent IS  - Monitor O2 saturations, O2 to keep sat  > 90%  - [facility], however patient does not report left sided rib pain     Left comminuted clavicle fracture  - [facility]  - Consult to Orthopedics-non op  - Sling for comfort  - Per Ortho: OK for waist level activities and ADLs, no overhead activities      T11 & T12 age-indeterminate compression fractures   - Consult to Neurosurgery for evaluation, appreciate recommendations  - [facility]  - Patient reports disc disease but unsure of level     Fall  - [facility]  - PT/OT-will likely require placement  - Lovenox 40 mg q12h for VTE ppx  - Fall and standard precautions

## 2024-02-01 NOTE — CONSULTS
[facility] - 9th Floor Med Surg  Neurosurgery  Consult Note    Inpatient consult to Neurosurgery  Consult performed by: Charmaine Kruger PA  Consult ordered by: Mirella Lobo NP        Subjective:     Chief Complaint/Reason for Admission: Fall at home    History of Present Illness: Patient is an 88 year old male with unknown PMHx, who presented to the ED on 1/31 following a fall at home. He reports his walker got away from him and he fell. He was a level 2 TTA and had scalp laceration repair in ED. Further imaging significant for left clavicle fracture, left 4th & 5th rib fractures on CXR however not visualized on CT, and age-indeterminate T11 & T12 compression fractures. Dr. Jin was consulted for evaluation and treatment recommendations.     On PE this am he is sitting up in bed, NAD. He denies neck and back pain. He denies numbness and tingling in bilateral UE and LE. He denies HA, blurred vision and N/V.     PTA Medications   Medication Sig    aspirin (ECOTRIN) 81 MG EC tablet Take 81 mg by mouth once daily.    budesonide-formoterol 160-4.5 mcg (SYMBICORT) 160-4.5 mcg/actuation HFAA Inhale 2 puffs into the lungs every 12 (twelve) hours. Controller    cetirizine (ZYRTEC) 10 MG tablet Take 10 mg by mouth once daily.    clopidogreL (PLAVIX) 75 mg tablet Take 75 mg by mouth once daily.    cyanocobalamin (VITAMIN B-12) 1000 MCG tablet Take 100 mcg by mouth once daily.    dupilumab (DUPIXENT PEN) 300 mg/2 mL PnIj Inject into the skin.    glucosamine-chondroitin (OSTEO BI-FLEX) 250-200 mg Tab Take by mouth.    multivitamin (THERAGRAN) per tablet Take 1 tablet by mouth once daily.    pantoprazole (PROTONIX) 40 MG tablet Take 40 mg by mouth once daily.    pyridoxine, vitamin B6, (VITAMIN B-6) 100 MG Tab Take 50 mg by mouth once daily.    rosuvastatin (CRESTOR) 20 MG tablet Take 20 mg by mouth once daily.    valsartan (DIOVAN) 320 MG tablet Take 320 mg by mouth once daily.    vitamin D  (VITAMIN D3) 1000 units Tab Take 2,000 Units by mouth once daily.       Review of patient's allergies indicates:  No Known Allergies    No past medical history on file.  No past surgical history on file.  Family History    None       Tobacco Use    Smoking status: Not on file    Smokeless tobacco: Not on file   Substance and Sexual Activity    Alcohol use: Not on file    Drug use: Not on file    Sexual activity: Not on file     Review of Systems  Objective:     Weight: 81.6 kg (179 lb 14.3 oz)  Body mass index is 23.1 kg/m².  Vital Signs (Most Recent):  Temp: (P) 98.9 °F (37.2 °C) (02/01/24 0824)  Pulse: (P) 78 (02/01/24 0824)  Resp: 18 (02/01/24 0443)  BP: (!) (P) 102/42 (02/01/24 0824)  SpO2: (!) (P) 92 % (02/01/24 0824) Vital Signs (24h Range):  Temp:  [97.9 °F (36.6 °C)-98.9 °F (37.2 °C)] (P) 98.9 °F (37.2 °C)  Pulse:  [68-80] (P) 78  Resp:  [14-23] 18  SpO2:  [91 %-100 %] (P) 92 %  BP: (103-150)/(62-81) (P) 102/42   Physical Exam:  Nursing note and vitals reviewed.    Constitutional: He appears well-developed and well-nourished. No distress.     Eyes: Pupils are equal, round, and reactive to light. EOM are normal.     Cardiovascular: Intact distal pulses.     Abdominal: Soft.     Skin:   Laceration to left scalp, sutured. Multiple skin tears and breakdown UE and LE     Psych/Behavior: He is alert. He is oriented to person, place, and time. He has a normal mood and affect.     Musculoskeletal:        Neck: Range of motion is full. There is no tenderness.        Back: Range of motion is full. There is no tenderness.        Right Upper Extremities: Range of motion is full. Muscle strength is 5/5.        Left Upper Extremities: Range of motion is full. Muscle strength is 5/5.       Right Lower Extremities: Range of motion is full. Muscle strength is 5/5.        Left Lower Extremities: Range of motion is full. Muscle strength is 5/5.     Neurological:        Sensory: There is no sensory deficit in the trunk. There  is no sensory deficit in the extremities.        Cranial nerves: Cranial nerve(s) II, III, IV, V, VI, VII, VIII, IX, X, XI and XII are intact.       Significant Labs:  Recent Labs   Lab 01/31/24  1146 02/01/24  0553    141   K 4.6 4.3   CO2 25 26   BUN 30.7* 29.0*   CREATININE 0.78 0.79   CALCIUM 9.4 8.9   MG  --  2.00     Recent Labs   Lab 01/31/24  1146 02/01/24  0553   WBC 8.76 7.50   HGB 14.9 12.6*   HCT 47.6 38.9*    178     Recent Labs   Lab 01/31/24  1146   INR 1.1     Microbiology Results (last 7 days)       ** No results found for the last 168 hours. **            Significant Diagnostics:  CT Chest Abdomen Pelvis With IV Contrast (XPD) Routine Oral Contrast [[id number]] Resulted: 01/31/24 1734   Order Status: Completed Updated: 01/31/24 1736   Narrative:     EXAMINATION:  CT CHEST ABDOMEN PELVIS WITH IV CONTRAST (XPD)    CLINICAL HISTORY:  Polytrauma, blunt;    TECHNIQUE:  CT imaging of the chest, abdomen and pelvis after IV contrast. Axial, coronal and sagittal reformatted images reviewed. Dose length product is 2405 mGycm. Automatic exposure control, adjustment of mA/kV or iterative reconstruction technique used to limit radiation dose.    COMPARISON:  No relevant comparison studies available at the time of dictation.    FINDINGS:  No mediastinal hematoma or significant pleural/pericardial fluid.  Mild bilateral dependent atelectasis.  No pleural effusion.    No defined hepatic or splenic laceration.  Normal pancreas and adrenal glands.  No acute renal findings.  Few small bladder diverticula.  Mildly enlarged prostate.  No mesenteric hematoma, pneumoperitoneum or ascites.  Bilateral iliac venous stents.    Comminuted, mildly displaced fracture of the distal left clavicle.  Age-indeterminate mild compression deformities of the T11 and T12 vertebral bodies.  Additional compression deformities of the L1 through L3 vertebral bodies appear chronic.  No significant retropulsion.   Impression:        Comminuted distal left clavicle fracture.  Age indeterminate mild compression deformities of the T11 and T12 vertebral bodies.       Assessment/Plan:     Active Diagnoses:    Diagnosis Date Noted POA    PRINCIPAL PROBLEM:  Closed fracture of multiple ribs of left side [S22.42XA] 01/31/2024 Yes    Closed fracture of acromial end of left clavicle [S42.032A] 01/31/2024 Yes    Compression fracture of T11 vertebra [S22.080A] 01/31/2024 Yes    Fall [W19.XXXA] 01/31/2024 Yes      Problems Resolved During this Admission:     He has no c/o pain in his thoracic and lumbar spine.  CT reviewed; T spine fxs are mild, likely subacute. No neurosurgical intervention planned.  OK to ambulate with PT/OT without bracing    Thank you for your consult. I will sign off. Please contact us if you have any additional questions.    ASUNCION Fagan  Neurosurgery  [facility] - 9th Floor Med Surg

## 2024-02-01 NOTE — PT/OT/SLP EVAL
Occupational Therapy  Evaluation    Name: Donovan Enriquez Jr.  MRN: [id number]  Admitting Diagnosis: GLF at home  Recent Surgery: * No surgery found *      Recommendations:     Discharge therapy intensity: High Intensity Therapy   Discharge Equipment Recommendations:  to be determined by next level of care  Barriers to discharge:       Assessment:     Donovan Enriquez Jr. is a 88 y.o. male with a medical diagnosis of L 4th and 5th rib fxs, L comminuted clavicle fx (per ortho, sling for comfort- WBAT for transfers and ok for waist level axs and ADLs, no overhead ax), T11 & T12 compression fxs, per NSGY non op and no brace needed for mobilization.  He presents with the following performance deficits affecting function: weakness, impaired endurance, impaired self care skills, impaired functional mobility, gait instability, impaired balance, decreased upper extremity function, decreased lower extremity function, orthopedic precautions.   Pt tolerated initial evaluation well, very motivated to participate in therapy. Pt reports at baseline he has had L sided weakness due to neuropathy and wears and AFO for L foot drop. Pt has had 3 falls this past month and was ambulatory with rollator prior to this fall. Today, pt required Max A for bed mobility (2 person) and Mod A x2 for sit>stand and side stepping along EOB. Required assist to facilitate LLE movement. Pt with minimal LUE mvmt at shoulder (chronic) and 4th and 5th digits with flexed posture. Pt reports he has a brace at home for this. Pt would benefit from high intensity therapy upon discharge as pt's wife cannot provide this level of care for patient.     Rehab Prognosis: Good; patient would benefit from acute skilled OT services to address these deficits and reach maximum level of function.       Plan:     Patient to be seen 4 x/week to address the above listed problems via self-care/home management, therapeutic exercises, therapeutic activities  Plan of Care Expires:  03/01/24  Plan of Care Reviewed with: patient, spouse    Subjective     Chief Complaint: L side weakness  Patient/Family Comments/goals: to get better    Occupational Profile:  Living Environment: lives with wife in [facility] with 1 [address]; walk in shower with no DME  Previous level of function: requires assist for sit>stand from low surfaces, assist with ADLs from wife  Equipment Used at Home: rollator, raised toilet (lift chair that pt sleeps in)  Assistance upon Discharge: wife    Pain/Comfort:  Pain Rating 1: 0/10    Patients cultural, spiritual, Taoism conflicts given the current situation: no    Objective:     OT communicated with RN prior to session.      Patient was found HOB elevated with peripheral IV, pulse ox (continuous), SCD, telemetry upon OT entry to room.    General Precautions: Standard, fall  Orthopedic Precautions: spinal precautions (LUE WBAT for t/f and waist level activity only, no overhead ROM)  Braces: N/A    Vital Signs: Blood Pressure: 99/55 HOB elevated, 104/64 sitting EOB (pt asymptomatic)  HR:   Sp02: 92%  Respiratory Status: on room air    Bed Mobility:    Patient completed Supine to Sit with maximal assistance (2 person)  Patient completed Sit to Supine with maximal assistance (2 person)    Functional Mobility/Transfers:  Patient completed Sit <> Stand Transfer with moderate assistance x2 with  rolling walker   Functional Mobility: side stepped along EOB with RW and Mod A x2 and assist to progress LLE; forward flexed posture at baseline     Activities of Daily Living:  Lower Body Dressing: maximal assistance for donning socks    Functional Cognition:  Orientation: oriented to Person, Place, Time, and Situation  Safety Awareness: [facility]  Insight into Deficits: [facility]    Upper Extremity Function:  Right Upper Extremity:   Strength: WFL    Left Upper Extremity:  Strength: 2-/5 at shoulder, elbow 3-/5, wrist 3-/5,  3-    Balance:   Static sitting balance: Min A-CGA  Dynamic sitting  balance:Min A  Static standing balance:Mod A  Dynamic standing balance:Mod A x2    Therapeutic Positioning  Risk for acquired pressure injuries is increased due to impaired mobility.    OT interventions performed during the course of today's session:   Education was provided on benefits of and recommendations for therapeutic positioning  Therapeutic positioning was provided at the conclusion of session to offload all bony prominences for the prevention and/or reduction of pressure injuries    Skin assessment: all bony prominences were assessed    Findings: known area of altered skin integrity at L elbow, forehead, BLEs    OT recommendations for therapeutic positioning throughout hospitalization:   Follow [facility] Pressure Injury Prevention Protocol  Geomat recommended for sacral protection while [facility]    Patient Education:  Patient and spouse were provided with verbal education education regarding OT role/goals/POC, post op precautions, fall prevention, safety awareness, and Discharge/DME recommendations.  Understanding was verbalized.     Patient left HOB elevated with all lines intact, call button in reach, and wife present    GOALS:   Multidisciplinary Problems       Occupational Therapy Goals          Problem: Occupational Therapy    Goal Priority Disciplines Outcome Interventions   Occupational Therapy Goal     OT, PT/OT Ongoing, Progressing    Description: Goals to be met by: 3/1/24     Patient will increase functional independence with ADLs by performing:    UE Dressing with Contact Guard Assistance.  LE Dressing with Minimal Assistance.  Grooming while seated with Set-up Assistance.  Toileting from bedside commode with Minimal Assistance for hygiene and clothing management.   Toilet transfer to bedside commode with Minimal Assistance.  Increased functional strength to 3/5 in LUE.                         History:     No past medical history on file.    No past surgical history on file.    Time Tracking:     OT  Date of Treatment: 02/01/24  OT Start Time: 1107  OT Stop Time: 1140  OT Total Time (min): 33 min    Billable Minutes:Evaluation moderate complexity    2/1/2024

## 2024-02-01 NOTE — PLAN OF CARE
Problem: Physical Therapy  Goal: Physical Therapy Goal  Description: Goals to be met by: 3/1/24     Patient will increase functional independence with mobility by performin. Supine to sit with MInimal Assistance  2. Sit to stand transfer with Minimal Assistance  3. Bed to chair transfer with Minimal Assistance using Rolling Walker  4. Gait  x 100 feet with Minimal Assistance using Rolling Walker.     Outcome: Ongoing, Progressing

## 2024-02-01 NOTE — H&P
Trauma Surgery   History and Physical Note    Patient Name: Donovan Enriquez Jr.  YOB: 1935  Date: 01/31/2024 6:45 PM  Date of Admission: 1/31/2024  HD#0  POD#* No surgery found *    PRESENTING HISTORY   Chief Complaint/Reason for Admission: Fall    History of Present Illness:  Patient is a 88 year old male who presents following a fall at home. He reports his walker got away from him and he fell. He was a level 2 TTA and had scalp laceration repair in ED. Further imaging significant for left clavicle fracture, left 4th & 5th rib fractures on CXR however not visualized on CT, and age-indeterminate T11 & T12 compression fractures.     Review of Systems:  12 point ROS negative except as stated in HPI    PAST HISTORY:   Past medical history:  HTN, neuropathy, mixed PVD with ulcers, lymphedema, CAD, cardiomegaly, asthma      Past surgical history:  No past surgical history on file.    Family history:  No family history on file.    Social history:  Patient denies tobacco use or drug use. Reports alcohol use only on Tam.   Social History     Socioeconomic History    Marital status:      Social History     Tobacco Use   Smoking Status Not on file   Smokeless Tobacco Not on file      Social History     Substance and Sexual Activity   Alcohol Use Not on file        MEDICATIONS & ALLERGIES:   Allergies: Review of patient's allergies indicates:  No Known Allergies  Home Meds:   Current Outpatient Medications   Medication Instructions    aspirin (ECOTRIN) 81 mg, Oral, Daily    budesonide-formoterol 160-4.5 mcg (SYMBICORT) 160-4.5 mcg/actuation HFAA 2 puffs, Inhalation, Every 12 hours, Controller    cetirizine (ZYRTEC) 10 mg, Oral, Daily    clopidogreL (PLAVIX) 75 mg, Oral, Daily    cyanocobalamin (VITAMIN B-12) 100 mcg, Oral, Daily    dupilumab (DUPIXENT PEN) 300 mg/2 mL PnIj Subcutaneous    glucosamine-chondroitin (OSTEO BI-FLEX) 250-200 mg Tab Oral    multivitamin (THERAGRAN) per tablet 1 tablet,  Oral, Daily    pantoprazole (PROTONIX) 40 mg, Oral, Daily    pyridoxine (vitamin B6) (VITAMIN B-6) 50 mg, Oral, Daily    rosuvastatin (CRESTOR) 20 mg, Oral, Daily    valsartan (DIOVAN) 320 mg, Oral, Daily    vitamin D (VITAMIN D3) 2,000 Units, Oral, Daily      No current facility-administered medications on file prior to encounter.     Current Outpatient Medications on File Prior to Encounter   Medication Sig Dispense Refill    aspirin (ECOTRIN) 81 MG EC tablet Take 81 mg by mouth once daily.      budesonide-formoterol 160-4.5 mcg (SYMBICORT) 160-4.5 mcg/actuation HFAA Inhale 2 puffs into the lungs every 12 (twelve) hours. Controller      cetirizine (ZYRTEC) 10 MG tablet Take 10 mg by mouth once daily.      clopidogreL (PLAVIX) 75 mg tablet Take 75 mg by mouth once daily.      cyanocobalamin (VITAMIN B-12) 1000 MCG tablet Take 100 mcg by mouth once daily.      dupilumab (DUPIXENT PEN) 300 mg/2 mL PnIj Inject into the skin.      glucosamine-chondroitin (OSTEO BI-FLEX) 250-200 mg Tab Take by mouth.      multivitamin (THERAGRAN) per tablet Take 1 tablet by mouth once daily.      pantoprazole (PROTONIX) 40 MG tablet Take 40 mg by mouth once daily.      pyridoxine, vitamin B6, (VITAMIN B-6) 100 MG Tab Take 50 mg by mouth once daily.      rosuvastatin (CRESTOR) 20 MG tablet Take 20 mg by mouth once daily.      valsartan (DIOVAN) 320 MG tablet Take 320 mg by mouth once daily.      vitamin D (VITAMIN D3) 1000 units Tab Take 2,000 Units by mouth once daily.        No current facility-administered medications on file prior to encounter.     Current Outpatient Medications on File Prior to Encounter   Medication Sig    aspirin (ECOTRIN) 81 MG EC tablet Take 81 mg by mouth once daily.    budesonide-formoterol 160-4.5 mcg (SYMBICORT) 160-4.5 mcg/actuation HFAA Inhale 2 puffs into the lungs every 12 (twelve) hours. Controller    cetirizine (ZYRTEC) 10 MG tablet Take 10 mg by mouth once daily.    clopidogreL (PLAVIX) 75 mg tablet  "Take 75 mg by mouth once daily.    cyanocobalamin (VITAMIN B-12) 1000 MCG tablet Take 100 mcg by mouth once daily.    dupilumab (DUPIXENT PEN) 300 mg/2 mL PnIj Inject into the skin.    glucosamine-chondroitin (OSTEO BI-FLEX) 250-200 mg Tab Take by mouth.    multivitamin (THERAGRAN) per tablet Take 1 tablet by mouth once daily.    pantoprazole (PROTONIX) 40 MG tablet Take 40 mg by mouth once daily.    pyridoxine, vitamin B6, (VITAMIN B-6) 100 MG Tab Take 50 mg by mouth once daily.    rosuvastatin (CRESTOR) 20 MG tablet Take 20 mg by mouth once daily.    valsartan (DIOVAN) 320 MG tablet Take 320 mg by mouth once daily.    vitamin D (VITAMIN D3) 1000 units Tab Take 2,000 Units by mouth once daily.     Scheduled Meds:   acetaminophen  650 mg Oral Q4H    docusate sodium  100 mg Oral BID    enoxparin  40 mg Subcutaneous Q12H (prophylaxis, 0900/2100)    methocarbamoL  500 mg Oral Q8H    polyethylene glycol  17 g Oral BID     Continuous Infusions:   [START ON 2/1/2024] lactated ringers       PRN Meds:magnesium hydroxide 400 mg/5 ml, melatonin, oxyCODONE    OBJECTIVE:   Vital Signs:  VITAL SIGNS: 24 HR MIN & MAX LAST   Temp  Min: 98.2 °F (36.8 °C)  Max: 98.4 °F (36.9 °C)  98.4 °F (36.9 °C)   BP  Min: 112/65  Max: 150/79  113/67    Pulse  Min: 68  Max: 77  77    Resp  Min: 16  Max: 23  16    SpO2  Min: 91 %  Max: 100 %  96 %      HT: 6' 2" (188 cm)  WT: 81.6 kg (180 lb)  BMI: 23.1   Intake/output: No intake/output data recorded.     Lines/drains/airway:       Peripheral IV - Single Lumen 01/31/24 1135 18 G Anterior;Distal;Right Upper Arm (Active)   Number of days: 0       Physical Exam:  General:  Well developed, frail  HEENT:  Normocephalic, scalp laceration with sutures in place  CV:  RR, 2+ radial and DPs bilaterally  Resp/chest: NWOB  GI:  Abdomen soft, non-tender, non-distended  :  Deferred  MSK:  No cyanosis, moving all extremities spontaneously, 1+ edema BLE  Neuro: GCS 15. CNII-XII grossly intact, alert and oriented " "to person, place, and time. Strength and motor function grossly intact to all extremities, sensation intact to all extremities.  Skin/Wounds:  Warm ,dry, skin tear to left elbow and left 4th and 5th digits, repaired scalp laceration, chronic stasis ulcer RLE    Labs:  Troponin:  No results for input(s): "TROPONINI" in the last 72 hours.  CBC:  Recent Labs     01/31/24  1146   WBC 8.76   RBC 5.10   HGB 14.9   HCT 47.6      MCV 93.3   MCH 29.2   MCHC 31.3*     CMP:  Recent Labs     01/31/24  1146   CALCIUM 9.4   ALBUMIN 3.4      K 4.6   CO2 25   BUN 30.7*   CREATININE 0.78   ALKPHOS 47   ALT 32   AST 40*   BILITOT 0.5     Lactic Acid:  No results for input(s): "LACTATE" in the last 72 hours.  ETOH:  No results for input(s): "ETHANOL" in the last 72 hours.   Urine Drug Screen:  No results for input(s): "COCAINE", "OPIATE", "BARBITURATE", "AMPHETAMINE", "FENTANYL", "CANNABINOIDS", "MDMA" in the last 72 hours.    Invalid input(s): "BENZODIAZEPINE", "PHENCYCLIDINE"   ABG  No results for input(s): "PH", "PO2", "PCO2", "HCO3", "BE" in the last 168 hours.      Diagnostic Results:  CT Chest Abdomen Pelvis With IV Contrast (XPD) Routine Oral Contrast   Final Result      Comminuted distal left clavicle fracture.  Age indeterminate mild compression deformities of the T11 and T12 vertebral bodies.         Electronically signed by: Ameya Stinson   Date:    01/31/2024   Time:    17:34      X-Ray Shoulder Trauma Left   Final Result      1.  Fracture distal left clavicle of indeterminate age.      2.  Recently acquired appearing fractures of the left posterior 4th and 5th ribs.         Electronically signed by: Jorden Horton   Date:    01/31/2024   Time:    15:31      X-Ray Chest 1 View   Final Result      Poor inspiratory effort.      Confluent opacities in the left base and left retrocardiac region with partial silhouetting of the left hemidiaphragm which might be related to an infiltrate/atelectasis.      Left-sided " rib fractures         Electronically signed by: Gordon Tapia   Date:    01/31/2024   Time:    12:09      X-Ray Pelvis Routine AP   Final Result      Degenerative changes with no acute fracture appreciated.         Electronically signed by: Jonathon Gordon   Date:    01/31/2024   Time:    12:28      CT Cervical Spine Without Contrast   Final Result      No acute fracture or malalignment identified.         Electronically signed by: Jorden Horton   Date:    01/31/2024   Time:    12:21      CT Head Without Contrast   Final Result      1.  No acute intracranial findings identified.      2.  Left frontal scalp large hemorrhagic inflammation..         Electronically signed by: Jorden Horton   Date:    01/31/2024   Time:    12:09          ASSESSMENT & PLAN:    Patient is a 88 year old male who will be admitted for evaluation of rib fractures, clavicle fracture and T-spine compression fractures s/p fall. He has been unable to ambulate with walker in the ED since fall s/t pain.    Left 4th & 5th rib fractures  - Frequent IS  - Monitor O2 saturations, O2 to keep sat > 90%  - [facility], however patient does not report left sided rib pain    Left comminuted clavicle fracture  - [facility]  - Consult to Orthopedics   - Sling for comfort  - Per Ortho: OK for waist level activities and ADLs, no overhead activities     T11 & T12 age-indeterminate compression fractures   - Consult to Neurosurgery for evaluation  - [facility]  - Patient reports disc disease but unsure of level    Fall  - [facility]  - PT/OT  - Lovenox 40 mg q12h for VTE ppx  - Fall and standard precautions     Admission code status discussion with patient and his son at bedside. Patient reports he has a power of  and living will. Patient wishes for CPR and intubation if needed. Patient is a FULL CODE.    Mirella Lobo, [id number], FNP-BC  Trauma Surgery  [facility]  C: [phone]

## 2024-02-01 NOTE — PROCEDURES
Laceration Repair Procedure    Patient Name: Donovan Enriquez Jr.  MRN: [id number]  YOB: 1935  Admit Date: 1/31/2024  HD#1  Date of Procedure: 02/01/2024    Procedure: Wound washout and laceration repair.  Location: left temporal scalp  Laceration dimensions: stellate, 3cm by 3cm  Anesthesia: 2% buffered lidocaine    Procedure in Detail:   The wound was prepped and draped in the sterile fashion. 8 cc of lidocaine 2% with epinephrine was then used to anaesthetized the skin edges of the laceration and the deep tissue of the wound cavity. Utilizing a clean technique, the wound was carefully explored for any abnormalities including signs of infection and foreign bodies. The wound cavity was copiously irrigated with sterile saline to remove any remaining debris. The wound was subsequently cleansed with betadine.  The dermis was reapproximated using 4-0 Vicryl suture.  The skin was then closed using 4-0 Ethilon in a simple interrupted fashion. The procedure was then complete - good closure and hemostasis. The patient tolerated the procedure well without complications.     Number of sutures:  12      Follow up:  Follow up visit set for suture removal and evaluation of the wound in 10-14 days.  Keep incisions clean and dry. Apply bacitracin over the wound.     Discussed with patient return precautions to include: fever, erythema, swelling, pain, or purulent drainage from the wound  Post procedure care was discussed with the nurse, patient, and family.

## 2024-02-01 NOTE — PLAN OF CARE
Patient lives in a single story home with spouse. Patient has multiple children that assist in patient care as needed. DME in home: Rollator. Patient currently has [facility] but would like to be discharged and changed to [facility], will start this process. Patient is interested in [facility], referral sent via [employer]. Will follow-up on PT/OT recs.      02/01/24 1314   Discharge Assessment   Assessment Type Discharge Planning Assessment   Confirmed/corrected address, phone number and insurance Yes   Confirmed Demographics Correct on Facesheet   Source of Information patient;family   Reason For Admission Fall   People in Home spouse   Facility Arrived From: Home   Do you expect to return to your current living situation? Yes   Do you have help at home or someone to help you manage your care at home? Yes   Prior to hospitilization cognitive status: Alert/Oriented   Current cognitive status: Alert/Oriented   Home Accessibility wheelchair accessible   Home Layout Able to live on 1st floor   Equipment Currently Used at Home rollator   Readmission within 30 days? No   Patient currently being followed by outpatient case management? No   Do you currently have service(s) that help you manage your care at home? Yes   Name and Contact number of agency [facility]   Is the pt/caregiver preference to resume services with current agency No   Do you take prescription medications? Yes   Do you have prescription coverage? Yes   Coverage Medicare/[employer]   Do you have any problems affording any of your prescribed medications? No   Is the patient taking medications as prescribed? yes   How do you get to doctors appointments? family or friend will provide   Are you on dialysis? No   Do you take coumadin? No   Discharge Plan A Rehab   Discharge Plan B Home Health   DME Needed Upon Discharge  other (see comments)  (TBD)   Discharge Plan discussed with: Patient;Spouse/sig other   Transition of Care Barriers None

## 2024-02-01 NOTE — PT/OT/SLP EVAL
"Physical Therapy Evaluation    Patient Name:  Donovan Enriquez Jr.   MRN:  [id number]    Recommendations:     Discharge therapy intensity: High Intensity Therapy   Discharge Equipment Recommendations: to be determined by next level of care   Barriers to discharge: Impaired mobility and Ongoing medical needs    Assessment:     Donovan Enriquez Jr. is a 88 y.o. male admitted with a medical diagnosis of L scalp lac, L 4-5 rib fx, L comminuted clavicle fx, T11-12 age-indeterminate compression fractures s/p GLF at home - pt was walking with his rollator and "it got away from him" and he fell onto his L side. 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 3 falls reported last month.  He presents with the following impairments/functional limitations: weakness, impaired endurance, gait instability, impaired functional mobility, impaired self care skills, impaired balance, decreased upper extremity function, decreased lower extremity function, pain, orthopedic precautions, impaired coordination . Recommend high intensity therapy at discharge as the patient's wife is his primary caregiver and she would be unable to care for him with the current level of assistance he needs; he has participated in IPR previously and reports improvements in function post.    Rehab Prognosis: Good; patient would benefit from acute skilled PT services to address these deficits and reach maximum level of function.    Recent Surgery: * No surgery found *      Plan:     During this hospitalization, patient to be seen 6 x/week to address the identified rehab impairments via gait training, therapeutic exercises, therapeutic activities, neuromuscular re-education and progress toward the following goals:    Plan of Care Expires:  03/01/24    Subjective     Chief Complaint: no complaints, "I'm feeling great."  Patient/Family Comments/goals: stronger so wife can take care of " him at home  Pain/Comfort:  Pain Rating 1: 0/10    Patients cultural, spiritual, Sabianist conflicts given the current situation: no    Living Environment:  Pt lives with his wife in a SLH with no DEXTER.  Prior to admission, patients level of function was required A for mobility with RW and A for all ADLS.  Equipment used at home: rollator, grab bar (lift chair that pt sleeps in).  DME owned (not currently used): none.  Upon discharge, patient will have assistance from wife.    Objective:     Communicated with RN prior to session.  Patient found HOB elevated with telemetry, SCD, peripheral IV  upon PT entry to room.    General Precautions: Standard, fall  Orthopedic Precautions:spinal precautions (LUE WBAT, waist level ax only, no overhead ROM)   Braces: N/A (Pt uses L AFO at home but not present on eval)  Respiratory Status: Room air  Blood Pressure: 99/55 supine, 104/64 sitting, asymptomatic throughout      Exams:  Cognitive Exam:  Patient is oriented to Person, Place, Time, and Situation  Postural Exam:  Patient presented with the following abnormalities:    -       Rounded shoulders  -       Forward head  -       Abnormal trunk flexion  -       Kyphosis  LUE Strength: chronic weakness 2/5 - see OT note for details  RLE Strength: 3+/5 gross; rigid tone  LLE Strength: 3-/5 hip flexion, 3-/5 quads, 1/5 DF, 3/5 toe ext, rigid tone  Skin integrity:  wound R shin, known      Functional Mobility:  Bed Mobility:     Rolling Left:  minimum assistance  Supine to Sit: maximal assistance and of 2 persons  Sit to Supine: maximal assistance and of 2 persons  Transfers:     Sit to Stand:  moderate assistance and of 2 persons with rolling walker  Gait: Sidestepped to HOB with modAx2, required A for lateral weight shifting and advancing LLE. Increasingly kyphotic with fatigue - pt stands at ~60-90 deg trunk flexion and wife reports this is his baseline.  Balance: Sitting balance = frequent posterior sway       AM-PAC 6 CLICK  MOBILITY  Total Score:11       Treatment & Education:      Patient and spouse were provided with verbal education education regarding PT role/goals/POC, post-op precautions, fall prevention, safety awareness, and discharge/DME recommendations.  Understanding was verbalized.     Patient left HOB elevated with all lines intact, call button in reach, RN notified, and wife present.    GOALS:   Multidisciplinary Problems       Physical Therapy Goals          Problem: Physical Therapy    Goal Priority Disciplines Outcome Goal Variances Interventions   Physical Therapy Goal     PT, PT/OT Ongoing, Progressing     Description: Goals to be met by: 3/1/24     Patient will increase functional independence with mobility by performin. Supine to sit with MInimal Assistance  2. Sit to stand transfer with Minimal Assistance  3. Bed to chair transfer with Minimal Assistance using Rolling Walker  4. Gait  x 100 feet with Minimal Assistance using Rolling Walker.                          History:     No past medical history on file.    No past surgical history on file.    Time Tracking:     PT Received On: 24  PT Start Time: 1109     PT Stop Time: 1142  PT Total Time (min): 33 min     Billable Minutes: Evaluation MOD      2024

## 2024-02-01 NOTE — PLAN OF CARE
Problem: Occupational Therapy  Goal: Occupational Therapy Goal  Description: Goals to be met by: 3/1/24     Patient will increase functional independence with ADLs by performing:    UE Dressing with Contact Guard Assistance.  LE Dressing with Minimal Assistance.  Grooming while seated with Set-up Assistance.  Toileting from bedside commode with Minimal Assistance for hygiene and clothing management.   Toilet transfer to bedside commode with Minimal Assistance.  Increased functional strength to 3/5 in LUE.    Outcome: Ongoing, Progressing

## 2024-02-02 VITALS
SYSTOLIC BLOOD PRESSURE: 110 MMHG | DIASTOLIC BLOOD PRESSURE: 58 MMHG | TEMPERATURE: 98 F | OXYGEN SATURATION: 95 % | WEIGHT: 179.88 LBS | RESPIRATION RATE: 18 BRPM | BODY MASS INDEX: 23.08 KG/M2 | HEART RATE: 68 BPM | HEIGHT: 74 IN

## 2024-02-02 LAB
ALBUMIN SERPL-MCNC: 2.7 G/DL (ref 3.4–4.8)
ALBUMIN/GLOB SERPL: 1 RATIO (ref 1.1–2)
ALP SERPL-CCNC: 36 UNIT/L (ref 40–150)
ALT SERPL-CCNC: 20 UNIT/L (ref 0–55)
AST SERPL-CCNC: 33 UNIT/L (ref 5–34)
BASOPHILS # BLD AUTO: 0.03 X10(3)/MCL
BASOPHILS NFR BLD AUTO: 0.3 %
BILIRUB SERPL-MCNC: 0.6 MG/DL
BUN SERPL-MCNC: 19.5 MG/DL (ref 8.4–25.7)
CALCIUM SERPL-MCNC: 8.6 MG/DL (ref 8.8–10)
CHLORIDE SERPL-SCNC: 108 MMOL/L (ref 98–107)
CO2 SERPL-SCNC: 23 MMOL/L (ref 23–31)
CREAT SERPL-MCNC: 0.66 MG/DL (ref 0.73–1.18)
EOSINOPHIL # BLD AUTO: 0.32 X10(3)/MCL (ref 0–0.9)
EOSINOPHIL NFR BLD AUTO: 3.7 %
ERYTHROCYTE [DISTWIDTH] IN BLOOD BY AUTOMATED COUNT: 15.2 % (ref 11.5–17)
GFR SERPLBLD CREATININE-BSD FMLA CKD-EPI: >60 MLS/MIN/1.73/M2
GLOBULIN SER-MCNC: 2.7 GM/DL (ref 2.4–3.5)
GLUCOSE SERPL-MCNC: 93 MG/DL (ref 82–115)
HCT VFR BLD AUTO: 39.2 % (ref 42–52)
HGB BLD-MCNC: 12.3 G/DL (ref 14–18)
IMM GRANULOCYTES # BLD AUTO: 0.03 X10(3)/MCL (ref 0–0.04)
IMM GRANULOCYTES NFR BLD AUTO: 0.3 %
LYMPHOCYTES # BLD AUTO: 1.9 X10(3)/MCL (ref 0.6–4.6)
LYMPHOCYTES NFR BLD AUTO: 22 %
MCH RBC QN AUTO: 29.5 PG (ref 27–31)
MCHC RBC AUTO-ENTMCNC: 31.4 G/DL (ref 33–36)
MCV RBC AUTO: 94 FL (ref 80–94)
MONOCYTES # BLD AUTO: 1.03 X10(3)/MCL (ref 0.1–1.3)
MONOCYTES NFR BLD AUTO: 11.9 %
NEUTROPHILS # BLD AUTO: 5.32 X10(3)/MCL (ref 2.1–9.2)
NEUTROPHILS NFR BLD AUTO: 61.8 %
NRBC BLD AUTO-RTO: 0 %
PLATELET # BLD AUTO: 164 X10(3)/MCL (ref 130–400)
PMV BLD AUTO: 9.7 FL (ref 7.4–10.4)
POTASSIUM SERPL-SCNC: 4.4 MMOL/L (ref 3.5–5.1)
PROT SERPL-MCNC: 5.4 GM/DL (ref 5.8–7.6)
RBC # BLD AUTO: 4.17 X10(6)/MCL (ref 4.7–6.1)
SODIUM SERPL-SCNC: 138 MMOL/L (ref 136–145)
WBC # SPEC AUTO: 8.63 X10(3)/MCL (ref 4.5–11.5)

## 2024-02-02 PROCEDURE — 80053 COMPREHEN METABOLIC PANEL: CPT

## 2024-02-02 PROCEDURE — 94761 N-INVAS EAR/PLS OXIMETRY MLT: CPT

## 2024-02-02 PROCEDURE — 97530 THERAPEUTIC ACTIVITIES: CPT | Mod: CQ

## 2024-02-02 PROCEDURE — 85025 COMPLETE CBC W/AUTO DIFF WBC: CPT

## 2024-02-02 PROCEDURE — 63600175 PHARM REV CODE 636 W HCPCS: Performed by: NURSE PRACTITIONER

## 2024-02-02 PROCEDURE — 25000003 PHARM REV CODE 250

## 2024-02-02 RX ORDER — METHOCARBAMOL 500 MG/1
500 TABLET, FILM COATED ORAL 3 TIMES DAILY PRN
Qty: 30 TABLET | Refills: 0
Start: 2024-02-02 | End: 2024-02-12

## 2024-02-02 RX ADMIN — ACETAMINOPHEN 325MG 650 MG: 325 TABLET ORAL at 02:02

## 2024-02-02 RX ADMIN — ACETAMINOPHEN 325MG 650 MG: 325 TABLET ORAL at 04:02

## 2024-02-02 RX ADMIN — ENOXAPARIN SODIUM 40 MG: 40 INJECTION SUBCUTANEOUS at 09:02

## 2024-02-02 RX ADMIN — METHOCARBAMOL 500 MG: 500 TABLET ORAL at 02:02

## 2024-02-02 RX ADMIN — DOCUSATE SODIUM 100 MG: 100 CAPSULE, LIQUID FILLED ORAL at 09:02

## 2024-02-02 RX ADMIN — POLYETHYLENE GLYCOL 3350 17 G: 17 POWDER, FOR SOLUTION ORAL at 09:02

## 2024-02-02 RX ADMIN — ACETAMINOPHEN 325MG 650 MG: 325 TABLET ORAL at 09:02

## 2024-02-02 RX ADMIN — METHOCARBAMOL 500 MG: 500 TABLET ORAL at 04:02

## 2024-02-02 NOTE — TERTIARY TRAUMA SURVEY NOTE
TERTIARY TRAUMA SURVEY (TTS)    List Injuries Identified to Date:   1. Left 4th and 5th rib fractures  2. Left comminuted clavicle fracture  3. T11 and T12 age indeterminate compression fractures  4. Left temporoparietal stellate laceration    []Problems list reviewed  List Operations and Procedures:   1. Laceration repair, scalp    No past surgical history on file.    Incidental findings:   1. None    Past Medical History:   1. HTN  2. Neuropathy  3. Mixed PVD with ulcers  4. CAD  5. Asthma    Active Ambulatory Problems     Diagnosis Date Noted    No Active Ambulatory Problems     Resolved Ambulatory Problems     Diagnosis Date Noted    No Resolved Ambulatory Problems     No Additional Past Medical History     No past medical history on file.    Tertiary Physical Exam:     Physical Exam  Constitutional:       General: He is not in acute distress.  HENT:      Head: Normocephalic. Laceration present.      Comments: Laceration to scalp with nonabsorbable sutures.     Right Ear: External ear normal.      Left Ear: External ear normal.      Nose: Nose normal.      Mouth/Throat:      Mouth: Mucous membranes are moist.   Eyes:      Pupils: Pupils are equal, round, and reactive to light.   Cardiovascular:      Rate and Rhythm: Normal rate and regular rhythm.   Pulmonary:      Effort: Pulmonary effort is normal.      Breath sounds: Normal breath sounds.   Abdominal:      General: Abdomen is flat. There is no distension.      Palpations: Abdomen is soft.      Tenderness: There is no abdominal tenderness.   Musculoskeletal:         General: Swelling present.      Cervical back: Normal range of motion.      Comments: RUE elbow with ulcer present.   Skin:     General: Skin is warm and dry.   Neurological:      General: No focal deficit present.      Mental Status: He is alert. Mental status is at baseline.         Imaging Review:     Imaging Results              CT Chest Abdomen Pelvis With IV Contrast (XPD) Routine Oral  Contrast (Final result)  Result time 01/31/24 17:34:33      Final result by Ameya Stinson MD (01/31/24 17:34:33)                   Impression:      Comminuted distal left clavicle fracture.  Age indeterminate mild compression deformities of the T11 and T12 vertebral bodies.      Electronically signed by: Ameya Stinson  Date:    01/31/2024  Time:    17:34               Narrative:    EXAMINATION:  CT CHEST ABDOMEN PELVIS WITH IV CONTRAST (XPD)    CLINICAL HISTORY:  Polytrauma, blunt;    TECHNIQUE:  CT imaging of the chest, abdomen and pelvis after IV contrast. Axial, coronal and sagittal reformatted images reviewed. Dose length product is 2405 mGycm. Automatic exposure control, adjustment of mA/kV or iterative reconstruction technique used to limit radiation dose.    COMPARISON:  No relevant comparison studies available at the time of dictation.    FINDINGS:  No mediastinal hematoma or significant pleural/pericardial fluid.  Mild bilateral dependent atelectasis.  No pleural effusion.    No defined hepatic or splenic laceration.  Normal pancreas and adrenal glands.  No acute renal findings.  Few small bladder diverticula.  Mildly enlarged prostate.  No mesenteric hematoma, pneumoperitoneum or ascites.  Bilateral iliac venous stents.    Comminuted, mildly displaced fracture of the distal left clavicle.  Age-indeterminate mild compression deformities of the T11 and T12 vertebral bodies.  Additional compression deformities of the L1 through L3 vertebral bodies appear chronic.  No significant retropulsion.                                       X-Ray Shoulder Trauma Left (Final result)  Result time 01/31/24 15:31:46      Final result by Jorden Horton MD (01/31/24 15:31:46)                   Impression:      1.  Fracture distal left clavicle of indeterminate age.    2.  Recently acquired appearing fractures of the left posterior 4th and 5th ribs.      Electronically signed by: Jorden  Horton  Date:    01/31/2024  Time:    15:31               Narrative:    EXAMINATION:  Left shoulder.    CLINICAL HISTORY:  Trauma.    TECHNIQUE:  Three views.    COMPARISON:  None available.    FINDINGS:  There is fracture deformity involving the distal left clavicle of indeterminate age with somewhat corticated margins.  There is heterogeneous sclerosis of the most distal portion of the clavicle.  There is no separation of the acromioclavicular joint.  No acute fracture or dislocation about the glenohumeral articulation.  There are fracture deformities of the left posterior 4th and 5th ribs with slight displacement.                                       X-Ray Chest 1 View (Final result)  Result time 01/31/24 12:09:41      Final result by Gordon Tapia MD (01/31/24 12:09:41)                   Impression:      Poor inspiratory effort.    Confluent opacities in the left base and left retrocardiac region with partial silhouetting of the left hemidiaphragm which might be related to an infiltrate/atelectasis.    Left-sided rib fractures      Electronically signed by: Gordon Tapia  Date:    01/31/2024  Time:    12:09               Narrative:    EXAMINATION:  XR CHEST 1 VIEW    CPT 81540    CLINICAL HISTORY:  r/o bleeding or hemorrhage;    FINDINGS:  Examination reveals mediastinal silhouette to be within normal limits cardiac silhouette is not enlarged this is a poor inspiratory effort that accentuates the pulmonary vascular markings.    Slightly more confluent opacities identified in the left retrocardiac region with partial silhouetting of the left hemidiaphragm which might be related to an infiltrate/atelectasis.    No other focal consolidative changes.    Some left-sided rib fractures are identified specially the 4th and perhaps 5th left posterior ribs                                       X-Ray Pelvis Routine AP (Final result)  Result time 01/31/24 12:28:28      Final result by Jonathon Gordon MD  (01/31/24 12:28:28)                   Impression:      Degenerative changes with no acute fracture appreciated.      Electronically signed by: Jonathon Gordon  Date:    01/31/2024  Time:    12:28               Narrative:    EXAMINATION:  XR PELVIS ROUTINE AP    CLINICAL HISTORY:  r/o bleeding or hemorrhage;    TECHNIQUE:  Single view of the pelvis.    COMPARISON:  No prior imaging available for comparison    FINDINGS:  Venous stents are present.  Degenerative changes with bilateral acetabular osteophytes.  No acute fracture identified.                                       CT Cervical Spine Without Contrast (Final result)  Result time 01/31/24 12:21:36      Final result by Jorden Horton MD (01/31/24 12:21:36)                   Impression:      No acute fracture or malalignment identified.      Electronically signed by: Jorden Horton  Date:    01/31/2024  Time:    12:21               Narrative:    EXAMINATION:  CT CERVICAL SPINE WITHOUT CONTRAST    CLINICAL HISTORY:  Trauma.    TECHNIQUE:  Multidetector axial images were performed of the cervical spine without and.  Images were reconstructed.    Automated exposure control was utilized to minimize radiation dose.  DLP 1492.    COMPARISON:  None available.    FINDINGS:  There is grade 1 anterolisthesis of C3 on C4 and C4 on C5.  Cervical vertebrae stature is preserved.  No acute fracture or malalignment identified.  There are multilevel degenerative changes which cause narrowings of the neural foramen.  There is calcification of the nuchal ligament at the level of C5 and C6.  There is no prevertebral soft tissue prominence.    This study does not exclude the possibility of intrathecal soft tissue, ligamentous or vascular injury.                                       CT Head Without Contrast (Final result)  Result time 01/31/24 12:09:03      Final result by Jorden Horton MD (01/31/24 12:09:03)                   Impression:      1.  No acute intracranial findings  identified.    2.  Left frontal scalp large hemorrhagic inflammation..      Electronically signed by: Jorden Horton  Date:    01/31/2024  Time:    12:09               Narrative:    EXAMINATION:  CT HEAD WITHOUT CONTRAST    CLINICAL HISTORY:  Trauma;    TECHNIQUE:  Sequential axial images were performed of the brain without contrast.    Dose product length of 1492 mGycm. Automated exposure control was utilized to minimize radiation dose.    COMPARISON:  None available1.    FINDINGS:  There is left frontal scalp large hyperdense hemorrhagic inflammation.  No acute depressed skull fracture.  There is no intracranial mass effect, midline shift, hydrocephalus or hemorrhage. There is no sulcal effacement or low attenuation changes to suggest recent large vessel territory infarction. Chronic appearing periventricular and subcortical white matter low attenuation changes are present and are consistent with chronic microangiopathic ischemia. The ventricular system and sulcal markings prominence is consistent with atrophy. There is no acute extra axial fluid collection. Visualized paranasal sinuses are clear without mucosal thickening, polypoidal abnormality or air-fluid levels. Mastoid air cells aeration is optimal.                                       Lab Review:   CBC:  Recent Labs   Lab Result Units 01/31/24  1146 02/01/24  0553 02/02/24  0423   WBC x10(3)/mcL 8.76 7.50 8.63   RBC x10(6)/mcL 5.10 4.20* 4.17*   Hgb g/dL 14.9 12.6* 12.3*   Hct % 47.6 38.9* 39.2*   Platelet x10(3)/mcL 195 178 164   MCV fL 93.3 92.6 94.0   MCH pg 29.2 30.0 29.5   MCHC g/dL 31.3* 32.4* 31.4*       CMP:  Recent Labs   Lab Result Units 01/31/24  1146 02/01/24  0553 02/02/24  0423   Calcium Level Total mg/dL 9.4 8.9 8.6*   Albumin Level g/dL 3.4 3.0* 2.7*   Sodium Level mmol/L 141 141 138   Potassium Level mmol/L 4.6 4.3 4.4   Carbon Dioxide mmol/L 25 26 23   Blood Urea Nitrogen mg/dL 30.7* 29.0* 19.5   Creatinine mg/dL 0.78 0.79 0.66*   Alkaline  "Phosphatase unit/L 47 37* 36*   Alanine Aminotransferase unit/L 32 22 20   Aspartate Aminotransferase unit/L 40* 25 33   Bilirubin Total mg/dL 0.5 0.5 0.6       Troponin:  No results for input(s): "TROPONINI" in the last 2160 hours.    ETOH:  No results for input(s): "ETHANOL" in the last 72 hours.     Urine Drug Screen:  No results for input(s): "COCAINE", "OPIATE", "BARBITURATE", "AMPHETAMINE", "FENTANYL", "CANNABINOIDS", "MDMA" in the last 72 hours.    Invalid input(s): "BENZODIAZEPINE", "PHENCYCLIDINE"     BRITTNEY Risk Assessment:   BRITTNEY Risk Factors: Hypertension                                      2  Total score: 2  Plan:   Inpatient rehab placement    "

## 2024-02-02 NOTE — PLAN OF CARE
Problem: Adult Inpatient Plan of Care  Goal: Plan of Care Review  2/1/2024 2215 by Lucina Reed RN  Outcome: Ongoing, Progressing  2/1/2024 2143 by Lucina Reed RN  Outcome: Ongoing, Progressing  Goal: Patient-Specific Goal (Individualized)  2/1/2024 2215 by Lucina Reed RN  Outcome: Ongoing, Progressing  2/1/2024 2143 by Lucina Reed RN  Outcome: Ongoing, Progressing  Goal: Absence of Hospital-Acquired Illness or Injury  2/1/2024 2215 by Lucina Reed RN  Outcome: Ongoing, Progressing  2/1/2024 2143 by Lucina Reed RN  Outcome: Ongoing, Progressing  Goal: Optimal Comfort and Wellbeing  2/1/2024 2215 by Lucina Reed RN  Outcome: Ongoing, Progressing  2/1/2024 2143 by Lucina Reed RN  Outcome: Ongoing, Progressing  Goal: Readiness for Transition of Care  2/1/2024 2215 by Lucina Reed RN  Outcome: Ongoing, Progressing  2/1/2024 2143 by Lucina Reed RN  Outcome: Ongoing, Progressing     Problem: Skin Injury Risk Increased  Goal: Skin Health and Integrity  2/1/2024 2215 by Lucina Reed RN  Outcome: Ongoing, Progressing  2/1/2024 2143 by Lucina Reed RN  Outcome: Ongoing, Progressing     Problem: Impaired Wound Healing  Goal: Optimal Wound Healing  2/1/2024 2215 by Lucina Reed RN  Outcome: Ongoing, Progressing  2/1/2024 2143 by Lucina Reed RN  Outcome: Ongoing, Progressing     Problem: Fall Injury Risk  Goal: Absence of Fall and Fall-Related Injury  2/1/2024 2215 by Lucina Reed RN  Outcome: Ongoing, Progressing  2/1/2024 2143 by Lucina Reed RN  Outcome: Ongoing, Progressing

## 2024-02-02 NOTE — PT/OT/SLP PROGRESS
Physical Therapy Treatment    Patient Name:  Donovan Enriquez Jr.   MRN:  [id number]    Recommendations:     Discharge therapy intensity: High Intensity Therapy   Discharge Equipment Recommendations: to be determined by next level of care  Barriers to discharge: Impaired mobility    Assessment:     Donovan Enriquez Jr. is a 88 y.o. male admitted with a medical diagnosis of L scalp lac, L 4-5 rib fx, L comminuted clavicle fx, T11-12 age-indeterminate compression fractures s/p GLF .  He presents with the following impairments/functional limitations: weakness, impaired endurance, impaired sensation, impaired self care skills, impaired functional mobility, impaired balance, decreased coordination, decreased upper extremity function, decreased lower extremity function, pain, impaired coordination .    Rehab Prognosis: Good; patient would benefit from acute skilled PT services to address these deficits and reach maximum level of function.    Recent Surgery: * No surgery found *      Plan:     During this hospitalization, patient to be seen 6 x/week to address the identified rehab impairments via gait training, therapeutic activities, therapeutic exercises, neuromuscular re-education and progress toward the following goals:    Plan of Care Expires:  03/01/24    Subjective     Chief Complaint: difficulty moving  Patient/Family Comments/goals: going to rehab to improve movement  Pain/Comfort:  Pain Rating 1: 0/10      Objective:     Communicated with pts nurse prior to session.  Patient found HOB elevated with telemetry, SCD, peripheral IV upon PT entry to room.     General Precautions: Standard, fall  Orthopedic Precautions: spinal precautions  Braces: N/A  Respiratory Status: Room air  Blood Pressure: 105/67  Skin Integrity:  stitches L scalp      Functional Mobility:  Bed Mobility:     Scooting: maximal assistance and of 2 persons  Supine to Sit: maximal assistance and of 2 persons  Transfers:     Sit to Stand:  maximal assistance and  of 2 persons with rolling walker and standing 4 mins w/ flexed posture  Bed to Chair: total assistance and of 2 persons with  hand-held assist  using  Squat Pivot  Balance: sitting EOB Min A due to posterior lean, standig Max A to obtain, CG/Min A to maintain    Education:  Patient and spouse were provided with verbal education education regarding PT role/goals/POC.  Understanding was verbalized.     Patient left  in trasport w/c to go to rehab hospital  with  nurse and wife present..    GOALS:   Multidisciplinary Problems       Physical Therapy Goals          Problem: Physical Therapy    Goal Priority Disciplines Outcome Goal Variances Interventions   Physical Therapy Goal     PT, PT/OT Ongoing, Progressing     Description: Goals to be met by: 3/1/24     Patient will increase functional independence with mobility by performin. Supine to sit with MInimal Assistance  2. Sit to stand transfer with Minimal Assistance  3. Bed to chair transfer with Minimal Assistance using Rolling Walker  4. Gait  x 100 feet with Minimal Assistance using Rolling Walker.                          Time Tracking:     PT Received On: 24  PT Start Time:1523   PT Stop Time: 1552  PT Total Time (min): 29 min     Billable Minutes: Therapeutic Activity 29    Treatment Type: Treatment  PT/PTA: PTA     Number of PTA visits since last PT visit: 2024

## 2024-02-02 NOTE — PLAN OF CARE
02/02/24 1028   Final Note   Assessment Type Final Discharge Note   Anticipated Discharge Disposition Rehab   Post-Acute Status   Post-Acute Authorization Placement   Post-Acute Placement Status Set-up Complete/Auth obtained  ([facility])   Discharge Delays (!) Ambulance Transport/Facility Transport     [facility] provide [facility]

## 2024-02-03 NOTE — DISCHARGE SUMMARY
[facility] - 9th Floor Med Surg  General Surgery  Discharge Summary      Patient Name: Donovan Enriquez Jr.  MRN: [id number]  Admission Date: 1/31/2024  Hospital Length of Stay: 2 days  Discharge Date and Time: 2/2/2024  3:58 PM  Attending Physician: No att. providers found   Discharging Provider: Angela Cabrera [facility]  Primary Care Provider: Rod Braun MD    HPI:   88 year old male who presents following a fall at home. He reports his walker got away from him and he fell. He was a level 2 TTA and had scalp laceration repair in ED. Further imaging significant for left clavicle fracture, left 4th & 5th rib fractures on CXR however not visualized on CT, and age-indeterminate T11 & T12 compression fractures.      * No surgery found *      Indwelling Lines/Drains at time of discharge:   Lines/Drains/Airways       None                 Hospital Course: 88 year old male who presents following a fall at home. He reports his walker got away from him and he fell. He was a level 2 TTA and had scalp laceration repair in ED. Further imaging significant for left clavicle fracture, left 4th & 5th rib fractures on CXR however not visualized on CT, and age-indeterminate T11 & T12 compression fractures.  This images were likely subacute/ chronic. However, therapy recommended rehab placement and once bed was secured he was discharged.    Goals of Care Treatment Preferences:  Code Status: Full Code      Consults:   Consults (From admission, onward)          Status Ordering Provider     Inpatient consult to Neurosurgery  Once        Provider:  Morgan Roman MD    Completed BRIGETTE BROOKS            Significant Diagnostic Studies: Labs: CMP   Recent Labs   Lab 02/01/24  0553 02/02/24  0423    138   K 4.3 4.4   CO2 26 23   BUN 29.0* 19.5   CREATININE 0.79 0.66*   CALCIUM 8.9 8.6*   ALBUMIN 3.0* 2.7*   BILITOT 0.5 0.6   ALKPHOS 37* 36*   AST 25 33   ALT 22 20    and CBC   Recent Labs   Lab  02/01/24  0553 02/02/24  0423   WBC 7.50 8.63   HGB 12.6* 12.3*   HCT 38.9* 39.2*    164     Radiology:   Imaging Results              CT Chest Abdomen Pelvis With IV Contrast (XPD) Routine Oral Contrast (Final result)  Result time 01/31/24 17:34:33      Final result by Ameya Stinson MD (01/31/24 17:34:33)                   Impression:      Comminuted distal left clavicle fracture.  Age indeterminate mild compression deformities of the T11 and T12 vertebral bodies.      Electronically signed by: Ameya Stinson  Date:    01/31/2024  Time:    17:34               Narrative:    EXAMINATION:  CT CHEST ABDOMEN PELVIS WITH IV CONTRAST (XPD)    CLINICAL HISTORY:  Polytrauma, blunt;    TECHNIQUE:  CT imaging of the chest, abdomen and pelvis after IV contrast. Axial, coronal and sagittal reformatted images reviewed. Dose length product is 2405 mGycm. Automatic exposure control, adjustment of mA/kV or iterative reconstruction technique used to limit radiation dose.    COMPARISON:  No relevant comparison studies available at the time of dictation.    FINDINGS:  No mediastinal hematoma or significant pleural/pericardial fluid.  Mild bilateral dependent atelectasis.  No pleural effusion.    No defined hepatic or splenic laceration.  Normal pancreas and adrenal glands.  No acute renal findings.  Few small bladder diverticula.  Mildly enlarged prostate.  No mesenteric hematoma, pneumoperitoneum or ascites.  Bilateral iliac venous stents.    Comminuted, mildly displaced fracture of the distal left clavicle.  Age-indeterminate mild compression deformities of the T11 and T12 vertebral bodies.  Additional compression deformities of the L1 through L3 vertebral bodies appear chronic.  No significant retropulsion.                                       X-Ray Shoulder Trauma Left (Final result)  Result time 01/31/24 15:31:46      Final result by Jorden Horton MD (01/31/24 15:31:46)                   Impression:      1.   Fracture distal left clavicle of indeterminate age.    2.  Recently acquired appearing fractures of the left posterior 4th and 5th ribs.      Electronically signed by: Jorden Horton  Date:    01/31/2024  Time:    15:31               Narrative:    EXAMINATION:  Left shoulder.    CLINICAL HISTORY:  Trauma.    TECHNIQUE:  Three views.    COMPARISON:  None available.    FINDINGS:  There is fracture deformity involving the distal left clavicle of indeterminate age with somewhat corticated margins.  There is heterogeneous sclerosis of the most distal portion of the clavicle.  There is no separation of the acromioclavicular joint.  No acute fracture or dislocation about the glenohumeral articulation.  There are fracture deformities of the left posterior 4th and 5th ribs with slight displacement.                                       X-Ray Chest 1 View (Final result)  Result time 01/31/24 12:09:41      Final result by Gordon Tapia MD (01/31/24 12:09:41)                   Impression:      Poor inspiratory effort.    Confluent opacities in the left base and left retrocardiac region with partial silhouetting of the left hemidiaphragm which might be related to an infiltrate/atelectasis.    Left-sided rib fractures      Electronically signed by: Gordon Tapia  Date:    01/31/2024  Time:    12:09               Narrative:    EXAMINATION:  XR CHEST 1 VIEW    CPT 29869    CLINICAL HISTORY:  r/o bleeding or hemorrhage;    FINDINGS:  Examination reveals mediastinal silhouette to be within normal limits cardiac silhouette is not enlarged this is a poor inspiratory effort that accentuates the pulmonary vascular markings.    Slightly more confluent opacities identified in the left retrocardiac region with partial silhouetting of the left hemidiaphragm which might be related to an infiltrate/atelectasis.    No other focal consolidative changes.    Some left-sided rib fractures are identified specially the 4th and perhaps 5th  left posterior ribs                                       X-Ray Pelvis Routine AP (Final result)  Result time 01/31/24 12:28:28      Final result by Jonathon Gordon MD (01/31/24 12:28:28)                   Impression:      Degenerative changes with no acute fracture appreciated.      Electronically signed by: Jonathon Gordon  Date:    01/31/2024  Time:    12:28               Narrative:    EXAMINATION:  XR PELVIS ROUTINE AP    CLINICAL HISTORY:  r/o bleeding or hemorrhage;    TECHNIQUE:  Single view of the pelvis.    COMPARISON:  No prior imaging available for comparison    FINDINGS:  Venous stents are present.  Degenerative changes with bilateral acetabular osteophytes.  No acute fracture identified.                                       CT Cervical Spine Without Contrast (Final result)  Result time 01/31/24 12:21:36      Final result by Jorden Horton MD (01/31/24 12:21:36)                   Impression:      No acute fracture or malalignment identified.      Electronically signed by: Jorden Horton  Date:    01/31/2024  Time:    12:21               Narrative:    EXAMINATION:  CT CERVICAL SPINE WITHOUT CONTRAST    CLINICAL HISTORY:  Trauma.    TECHNIQUE:  Multidetector axial images were performed of the cervical spine without and.  Images were reconstructed.    Automated exposure control was utilized to minimize radiation dose.  DLP 1492.    COMPARISON:  None available.    FINDINGS:  There is grade 1 anterolisthesis of C3 on C4 and C4 on C5.  Cervical vertebrae stature is preserved.  No acute fracture or malalignment identified.  There are multilevel degenerative changes which cause narrowings of the neural foramen.  There is calcification of the nuchal ligament at the level of C5 and C6.  There is no prevertebral soft tissue prominence.    This study does not exclude the possibility of intrathecal soft tissue, ligamentous or vascular injury.                                       CT Head Without Contrast (Final  result)  Result time 01/31/24 12:09:03      Final result by Jorden Horton MD (01/31/24 12:09:03)                   Impression:      1.  No acute intracranial findings identified.    2.  Left frontal scalp large hemorrhagic inflammation..      Electronically signed by: Jorden Horton  Date:    01/31/2024  Time:    12:09               Narrative:    EXAMINATION:  CT HEAD WITHOUT CONTRAST    CLINICAL HISTORY:  Trauma;    TECHNIQUE:  Sequential axial images were performed of the brain without contrast.    Dose product length of 1492 mGycm. Automated exposure control was utilized to minimize radiation dose.    COMPARISON:  None available1.    FINDINGS:  There is left frontal scalp large hyperdense hemorrhagic inflammation.  No acute depressed skull fracture.  There is no intracranial mass effect, midline shift, hydrocephalus or hemorrhage. There is no sulcal effacement or low attenuation changes to suggest recent large vessel territory infarction. Chronic appearing periventricular and subcortical white matter low attenuation changes are present and are consistent with chronic microangiopathic ischemia. The ventricular system and sulcal markings prominence is consistent with atrophy. There is no acute extra axial fluid collection. Visualized paranasal sinuses are clear without mucosal thickening, polypoidal abnormality or air-fluid levels. Mastoid air cells aeration is optimal.                                        Pending Diagnostic Studies:       None          Final Active Diagnoses:    Diagnosis Date Noted POA    PRINCIPAL PROBLEM:  Closed fracture of multiple ribs of left side [S22.42XA] 01/31/2024 Yes    Closed fracture of acromial end of left clavicle [S42.032A] 01/31/2024 Yes    Compression fracture of T11 vertebra [S22.080A] 01/31/2024 Yes    Fall [W19.XXXA] 01/31/2024 Yes      Problems Resolved During this Admission:      Discharged Condition fair     Disposition: Rehab Facility    Follow Up:   Follow-up  Information       [facility] up on 2/20/2024.    Why: 1000am, For suture removal    or can remove at rehab on 2/7  Contact information:  [address] [place] [address]  [phone]               Sanya Quiroz MD. Go on 2/20/2024.    Specialty: Orthopedic Surgery  Why: @1:15pm  Contact information:  [address] [facility] [facility]  [place] [address]  [phone]                           Patient Instructions:   No discharge procedures on file.  Medications:  Reconciled Home Medications:      Medication List        START taking these medications      methocarbamoL 500 MG Tab  Commonly known as: ROBAXIN  Take 1 tablet (500 mg total) by mouth 3 (three) times daily as needed (pain or muscle spasms).            CONTINUE taking these medications      aspirin 81 MG EC tablet  Commonly known as: ECOTRIN  Take 81 mg by mouth once daily.     budesonide-formoterol 160-4.5 mcg 160-4.5 mcg/actuation Hfaa  Commonly known as: SYMBICORT  Inhale 2 puffs into the lungs every 12 (twelve) hours. Controller     cetirizine 10 MG tablet  Commonly known as: ZYRTEC  Take 10 mg by mouth once daily.     clopidogreL 75 mg tablet  Commonly known as: PLAVIX  Take 75 mg by mouth once daily.     DUPIXENT  mg/2 mL Pnij  Generic drug: dupilumab  Inject into the skin.     multivitamin per tablet  Commonly known as: THERAGRAN  Take 1 tablet by mouth once daily.     OSTEO BI-FLEX 250-200 mg Tab  Generic drug: glucosamine-chondroitin  Take by mouth.     pantoprazole 40 MG tablet  Commonly known as: PROTONIX  Take 40 mg by mouth once daily.     rosuvastatin 20 MG tablet  Commonly known as: CRESTOR  Take 20 mg by mouth once daily.     valsartan 320 MG tablet  Commonly known as: DIOVAN  Take 320 mg by mouth once daily.     VITAMIN B-12 1000 MCG tablet  Generic drug: cyanocobalamin  Take 100 mcg by mouth once daily.     VITAMIN B-6 100 MG Tab  Generic drug: pyridoxine (vitamin B6)  Take 50 mg by mouth once daily.      vitamin D 1000 units Tab  Commonly known as: VITAMIN D3  Take 2,000 Units by mouth once daily.            Time spent on the discharge of patient: 25 minutes    LUIS MIGUEL [facility]  General Surgery  [facility] - 9th Floor Med Surg

## 2024-02-03 NOTE — HOSPITAL COURSE
88 year old male who presents following a fall at home. He reports his walker got away from him and he fell. He was a level 2 TTA and had scalp laceration repair in ED. Further imaging significant for left clavicle fracture, left 4th & 5th rib fractures on CXR however not visualized on CT, and age-indeterminate T11 & T12 compression fractures.  This images were likely subacute/ chronic. However, therapy recommended rehab placement and once bed was secured he was discharged.

## 2024-02-20 ENCOUNTER — OFFICE VISIT (OUTPATIENT)
Dept: ORTHOPEDICS | Facility: CLINIC | Age: 89
End: 2024-02-20
Payer: MEDICARE

## 2024-02-20 ENCOUNTER — HOSPITAL ENCOUNTER (OUTPATIENT)
Dept: RADIOLOGY | Facility: CLINIC | Age: 89
Discharge: HOME | End: 2024-02-20
Attending: ORTHOPAEDIC SURGERY
Payer: MEDICARE

## 2024-02-20 VITALS
HEART RATE: 79 BPM | HEIGHT: 74 IN | SYSTOLIC BLOOD PRESSURE: 114 MMHG | WEIGHT: 179.88 LBS | BODY MASS INDEX: 23.08 KG/M2 | DIASTOLIC BLOOD PRESSURE: 64 MMHG | RESPIRATION RATE: 18 BRPM

## 2024-02-20 DIAGNOSIS — S42.035D CLOSED NONDISPLACED FRACTURE OF ACROMIAL END OF LEFT CLAVICLE WITH ROUTINE HEALING, SUBSEQUENT ENCOUNTER: Primary | ICD-10-CM

## 2024-02-20 DIAGNOSIS — S42.035D CLOSED NONDISPLACED FRACTURE OF ACROMIAL END OF LEFT CLAVICLE WITH ROUTINE HEALING, SUBSEQUENT ENCOUNTER: ICD-10-CM

## 2024-02-20 PROCEDURE — 99024 POSTOP FOLLOW-UP VISIT: CPT | Mod: ,,, | Performed by: ORTHOPAEDIC SURGERY

## 2024-02-20 PROCEDURE — 73000 X-RAY EXAM OF COLLAR BONE: CPT | Mod: LT,,, | Performed by: ORTHOPAEDIC SURGERY

## 2024-02-20 NOTE — PROGRESS NOTES
Subjective:       Patient ID: Donovan Enriquez Jr. is a 88 y.o. male.    Chief Complaint   Patient presents with    Left Shoulder - Follow-up     2.5 WEEK F/U HOSP CONSULT FOR LEFT DISTAL CLAVICLE FX, NWB TO JOSE, REPORTS NO PAIN AT THIS TIME.         Patient is here today for follow-up evaluation status post fall with left distal clavicle fracture.  He was seen and evaluated in the emergency department and a consultation was performed.  He has been limited to no overhead activities.  He reports no pain in the left shoulder.  Minimal swelling.  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  No acute issues since last evaluation.  His wife states that he will be admitted to a sniff unit starting tomorrow due to his inability to mobilize well and need for therapy.    Follow-up  Pertinent negatives include no abdominal pain, chest pain, chills, congestion, coughing, fever, nausea, neck pain, numbness or vomiting.       Review of Systems   Constitutional: Negative for chills, fever and malaise/fatigue.   HENT:  Negative for congestion and hearing loss.    Eyes:  Negative for visual disturbance.   Cardiovascular:  Negative for chest pain and syncope.   Respiratory:  Negative for cough and shortness of breath.    Hematologic/Lymphatic: Does not bruise/bleed easily.   Skin:  Negative for color change and suspicious lesions.   Musculoskeletal:  Negative for falls and neck pain.   Gastrointestinal:  Negative for abdominal pain, nausea and vomiting.   Genitourinary:  Negative for dysuria and hematuria.   Neurological:  Negative for numbness and sensory change.   Psychiatric/Behavioral:  Negative for altered mental status. The patient is not nervous/anxious.         Current Outpatient Medications on File Prior to Visit   Medication Sig Dispense Refill    albuterol (PROVENTIL) 2.5 mg /3 mL (0.083 %) nebulizer solution INHALE 1 VIAL VIA NEBULIZER EVERY 6 HOURS      albuterol-ipratropium  (DUO-NEB) 2.5 mg-0.5 mg/3 mL nebulizer solution USE 1 VIAL IN NEBULIZER EVERY 6 HOURS      aspirin (ECOTRIN) 81 MG EC tablet Take 81 mg by mouth once daily.      aspirin 81 mg Cap Take 1 capsule by mouth Daily.      budesonide (PULMICORT) 0.5 mg/2 mL nebulizer solution INHALE 1 VIAL VIA NEBULIZER EVERY 12 HOURS      budesonide-formoterol 160-4.5 mcg (SYMBICORT) 160-4.5 mcg/actuation HFAA Inhale 2 puffs into the lungs.      budesonide-formoterol 160-4.5 mcg (SYMBICORT) 160-4.5 mcg/actuation HFAA Inhale 2 puffs into the lungs every 12 (twelve) hours. Controller      cetirizine (ZYRTEC) 10 MG tablet Take 10 mg by mouth once daily.      clopidogreL (PLAVIX) 75 mg tablet Take 75 mg by mouth.      clopidogreL (PLAVIX) 75 mg tablet Take 75 mg by mouth once daily.      cyanocobalamin (VITAMIN B-12) 1000 MCG tablet Take 1 tablet by mouth every morning.      cyanocobalamin (VITAMIN B-12) 1000 MCG tablet Take 100 mcg by mouth once daily.      dupilumab (DUPIXENT PEN) 300 mg/2 mL PnIj every 14 days Subcutaneous      dupilumab (DUPIXENT PEN) 300 mg/2 mL PnIj Inject into the skin.      fluconazole (DIFLUCAN) 150 MG Tab 1 tab(s) orally once a day for 2 days      furosemide (LASIX ORAL) Lasix Take No date recorded No form recorded No frequency recorded No route recorded No set duration recorded No set duration amount recorded active No dosage strength recorded No dosage strength units of measure recorded      gentamicin (GARAMYCIN) 0.1 % ointment Apply topically once daily. 15 g 1    glucosamine-chondroitin (OSTEO BI-FLEX) 250-200 mg Tab Take by mouth.      multivitamin (THERAGRAN) per tablet Take 1 tablet by mouth once daily.      multivitamin with minerals tablet Take 1 tablet by mouth.      pantoprazole (PROTONIX) 40 MG tablet TAKE 1 TABLET BY MOUTH EVERY DAY IN THE MORNING AND IN THE EVENING BEFORE MEALS      pantoprazole (PROTONIX) 40 MG tablet Take 40 mg by mouth once daily.      pyridoxine, vitamin B6, (B-6) 100 MG Tab Take  "1 tablet by mouth every morning.      pyridoxine, vitamin B6, (VITAMIN B-6) 100 MG Tab Take 50 mg by mouth once daily.      rosuvastatin (CRESTOR) 20 MG tablet Take 1 tablet by mouth every evening.      rosuvastatin (CRESTOR) 20 MG tablet Take 20 mg by mouth once daily.      traMADoL (ULTRAM) 50 mg tablet Take 50 mg by mouth every 8 (eight) hours as needed.      valsartan (DIOVAN) 320 MG tablet Take 320 mg by mouth every morning.      valsartan (DIOVAN) 320 MG tablet Take 320 mg by mouth once daily.      vitamin D (VITAMIN D3) 1000 units Tab Take 2 tablets by mouth every morning.      vitamin D (VITAMIN D3) 1000 units Tab Take 2,000 Units by mouth once daily.       No current facility-administered medications on file prior to visit.          Objective:      /64   Pulse 79   Resp 18   Ht 6' 2" (1.88 m)   Wt 81.6 kg (179 lb 14.3 oz)   BMI 23.10 kg/m²   Physical Exam  Constitutional:       General: He is not in acute distress.     Appearance: Normal appearance. He is not ill-appearing.   HENT:      Head: Normocephalic and atraumatic.      Nose: No congestion.   Eyes:      Extraocular Movements: Extraocular movements intact.   Cardiovascular:      Rate and Rhythm: Normal rate and regular rhythm.      Pulses: Normal pulses.   Pulmonary:      Effort: Pulmonary effort is normal.      Breath sounds: Normal breath sounds.   Abdominal:      General: There is no distension.      Palpations: Abdomen is soft.      Tenderness: There is no abdominal tenderness.   Musculoskeletal:      Comments: Left upper extremity:  No pain with passive circumduction of the shoulder.  No tenderness to palpation over the clavicle.  He has minimal bruising in the left upper arm.  No prominence of the distal clavicle.  Neurologically he is at baseline distally.  He has good range motion of the elbow.  He was limited in his strength in the left upper extremity due to chronic neurologic deficits.   Skin:     General: Skin is warm and dry. "   Neurological:      Mental Status: He is alert and oriented to person, place, and time. Mental status is at baseline.   Psychiatric:         Mood and Affect: Mood normal.         Behavior: Behavior normal.         Thought Content: Thought content normal.         Judgment: Judgment normal.        Body mass index is 23.1 kg/m².    Radiology:   Left clavicle two views:  Images obtained today and compared to his initial injury films demonstrate no displacement.  He has good signs of healing around the distal clavicle.      Assessment:         1. Closed nondisplaced fracture of acromial end of left clavicle with routine healing, subsequent encounter  X-Ray Clavicle Left              Plan:       There was some question at the time of his initial injury if this was an acute on chronic event.  He appears to have completely resolved all of the pain and swelling in the shoulder and has no evidence of any displacement.  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  He is released to full activities without any restrictions to left upper extremity.  No need for any further follow-up.  He is able to participate in therapy without any restrictions from an orthopedic standpoint.  He will follow up with me on an as-needed basis should any new issues arise for him in the future.  He and his wife understand and agree with all that we have discussed and all questions and concerns were addressed.      This note/OR report was created with the assistance of  voice recognition software or phone  dictation.  There may be transcription errors as a result of using this technology however minimal. Effort has been made to assure accuracy of transcription but any obvious errors or omissions should be clarified with the author of the document.       Sanya Quirzo MD  Orthopedic Trauma  [facility]      Follow up if symptoms worsen or fail to improve.    Closed nondisplaced fracture  of acromial end of left clavicle with routine healing, subsequent encounter  -     X-Ray Clavicle Left; Future; Expected date: 02/20/2024              Orders Placed This Encounter   Procedures    X-Ray Clavicle Left     Standing Status:   Future     Number of Occurrences:   1     Standing Expiration Date:   2/19/2025     Order Specific Question:   May the Radiologist modify the order per protocol to meet the clinical needs of the patient?     Answer:   Yes     Order Specific Question:   Release to patient     Answer:   Immediate       No future appointments.

## 2024-04-05 PROCEDURE — 87077 CULTURE AEROBIC IDENTIFY: CPT | Mod: 91 | Performed by: INTERNAL MEDICINE

## 2024-04-06 ENCOUNTER — LAB REQUISITION (OUTPATIENT)
Dept: LAB | Facility: HOSPITAL | Age: 89
End: 2024-04-06
Payer: MEDICARE

## 2024-04-06 DIAGNOSIS — L08.9 LOCAL INFECTION OF THE SKIN AND SUBCUTANEOUS TISSUE, UNSPECIFIED: ICD-10-CM

## 2024-04-09 LAB
BACTERIA WND CULT: ABNORMAL
BACTERIA WND CULT: ABNORMAL

## 2024-06-06 ENCOUNTER — HOSPITAL ENCOUNTER (OUTPATIENT)
Dept: WOUND CARE | Facility: HOSPITAL | Age: 89
Discharge: HOME | End: 2024-06-06
Attending: EMERGENCY MEDICINE
Payer: MEDICARE

## 2024-06-06 VITALS
RESPIRATION RATE: 20 BRPM | BODY MASS INDEX: 26.18 KG/M2 | HEIGHT: 74 IN | WEIGHT: 204 LBS | TEMPERATURE: 98 F | DIASTOLIC BLOOD PRESSURE: 72 MMHG | HEART RATE: 69 BPM | SYSTOLIC BLOOD PRESSURE: 114 MMHG

## 2024-06-06 DIAGNOSIS — L97.911 ULCER OF RIGHT LOWER EXTREMITY, LIMITED TO BREAKDOWN OF SKIN: Primary | ICD-10-CM

## 2024-06-06 DIAGNOSIS — L97.919 IDIOPATHIC CHRONIC VENOUS HYPERTENSION OF RIGHT LEG WITH ULCER: ICD-10-CM

## 2024-06-06 DIAGNOSIS — L97.922 CHRONIC ULCER OF LEFT LEG WITH FAT LAYER EXPOSED: ICD-10-CM

## 2024-06-06 DIAGNOSIS — I70.202 ATHEROSCLEROSIS OF LEFT LEG: ICD-10-CM

## 2024-06-06 DIAGNOSIS — I87.311 IDIOPATHIC CHRONIC VENOUS HYPERTENSION OF RIGHT LEG WITH ULCER: ICD-10-CM

## 2024-06-06 DIAGNOSIS — I70.201 ATHEROSCLEROSIS OF RIGHT LEG: ICD-10-CM

## 2024-06-06 DIAGNOSIS — I10 HYPERTENSION, UNSPECIFIED TYPE: ICD-10-CM

## 2024-06-06 DIAGNOSIS — D64.9 ANEMIA, UNSPECIFIED TYPE: ICD-10-CM

## 2024-06-06 DIAGNOSIS — I87.332 CHRONIC VENOUS HYPERTENSION WITH ULCER AND INFLAMMATION, LEFT: ICD-10-CM

## 2024-06-06 PROCEDURE — 99214 OFFICE O/P EST MOD 30 MIN: CPT | Mod: 25,ICN,, | Performed by: EMERGENCY MEDICINE

## 2024-06-06 PROCEDURE — 99214 OFFICE O/P EST MOD 30 MIN: CPT

## 2024-06-06 PROCEDURE — 11042 DBRDMT SUBQ TIS 1ST 20SQCM/<: CPT | Mod: ICN,,, | Performed by: EMERGENCY MEDICINE

## 2024-06-06 PROCEDURE — 27000999 HC MEDICAL RECORD PHOTO DOCUMENTATION

## 2024-06-06 NOTE — PROGRESS NOTES
[facility]    Subjective:     Patient ID: Donovan Enriquez Jr. is a 89 y.o. male.    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 I began to treat Mr Enriquez back in the summer of 2023 when he was referred after a rehab stay to help treat a mulitude of LLE stasis ulcers.  His vascular treatment is per Dr Darrian Higgins who has done angiograms and venograms . When I treated him, he had a distal LLE anterior leg ulcer and a left calf ulcer.  Both healed and he was discharged in early November 2023.  He did return to this clinic on 1/4/24 when he noted a new large bulla entity noted on right anterior shin as well an ulcer on distal left leg . It was improving but I last  saw this patient on 1/25/24: he never returned. Evidentally fell a few days after we last saw him and sustained a scalp lac and a left clavicle fx and has been in rehab/SNF until the end of May 2024.  He called a couple of days ago asking to return. He comes in today on 6/6/24 with 2   ulcers on right anterior leg. He said a wound care nurse practitioner at the skilled nursing facility was treating it for the past couple of months.  Said they were using alginate and/or collagen but they still remain open so he wanted to return here.  He states that he lost the ability to walk since his fall in his depends on wheelchairs and Ignacio lift.  He lives with his wife who is still pretty active.  He also has sitters in the morning and in the evening to help.        Review of Systems   Constitutional: Negative.    HENT: Negative.     Respiratory: Negative.     Cardiovascular: Negative.    Gastrointestinal: Negative.    Musculoskeletal: Negative.    Skin:  Positive for wound.   Neurological:  Positive for weakness (No  "longer can walk as of ).       Objective:     Visit Vitals  /72 (BP Location: Left arm)   Pulse 69   Temp 97.7 °F (36.5 °C) (Oral)   Resp 20   Ht 6' 2" (1.88 m)   Wt 92.5 kg (204 lb)   BMI 26.19 kg/m²         Physical Exam  Vitals reviewed.   Constitutional:       Comments: Elderly WM; looks more frail   Cardiovascular:      Pulses:           Dorsalis pedis pulses are detected w/ Doppler on the right side and detected w/ Doppler on the left side.      Comments: No ischemic changes to foot  Pulmonary:      Effort: Pulmonary effort is normal.   Musculoskeletal:      Right lower le+ Edema present.      Left lower le+ Edema present.        Legs:    Skin:     General: Skin is warm and dry.      Capillary Refill: Capillary refill takes less than 2 seconds.   Neurological:      General: No focal deficit present.      Mental Status: He is alert and oriented to person, place, and time. Mental status is at baseline.            Wound 24 133 Venous Ulcer Right anterior;lower;proximal Leg #1 (Active)   24 133   Present on Original Admission: Y   Primary Wound Type: Venous ulcer   Side: Right   Orientation: anterior;lower;proximal   Location: Leg   Wound Approximate Age at First Assessment (Weeks):    Wound Number: #1   Is this injury device related?: No   Incision Type:    Closure Method:    Wound Description (Comments):    Type:    Additional Comments:    Ankle-Brachial Index: santa done 24 = right dp = 0.95, right pt  = 1.02 / left dp = 0.79, left pt = 0.77   Pulses: + palpable, + doppler x 4 = biphasic   Removal Indication and Assessment:    Wound Outcome:    Wound Image    24 1335   Dressing Appearance Intact;Moist drainage 24 1335   Drainage Amount Moderate 24 1335   Drainage Characteristics/Odor Serosanguineous;Yellow;No odor 24 133   Appearance Red;Pink;Yellow;Gray 24 133   Tissue loss description Full thickness 24 1335   Black (%), Wound Tissue Color " 0 % 06/06/24 1335   Red (%), Wound Tissue Color 20 % 06/06/24 1335   Yellow (%), Wound Tissue Color 80 % 06/06/24 1335   Periwound Area Intact;Dry 06/06/24 1335   Wound Edges Defined;Irregular 06/06/24 1335   Wound Length (cm) 0.4 cm 06/06/24 1335   Wound Width (cm) 2.1 cm 06/06/24 1335   Wound Depth (cm) 0.1 cm 06/06/24 1335   Wound Volume (cm^3) 0.084 cm^3 06/06/24 1335   Wound Surface Area (cm^2) 0.84 cm^2 06/06/24 1335   Care Cleansed with:;Wound cleanser;Applied: 06/06/24 1335   Dressing Applied;Honey;Absorptive Pad;Rolled gauze 06/06/24 1415   Periwound Care Absorptive dressing applied 06/06/24 1415   Off Loading Other (see comments) 06/06/24 1415            Wound 06/06/24 1336 Venous Ulcer Right anterior;lower;lateral Leg #2 (Active)   06/06/24 1336   Present on Original Admission: Y   Primary Wound Type: Venous ulcer   Side: Right   Orientation: anterior;lower;lateral   Location: Leg   Wound Approximate Age at First Assessment (Weeks):    Wound Number: #2   Is this injury device related?:    Incision Type:    Closure Method:    Wound Description (Comments):    Type:    Additional Comments:    Ankle-Brachial Index: santa done 6/6/24 = right dp = 0.95, right pt = 1.02 / left dp = 0.79, left pt = 0.77   Pulses: + palpable, + doppler x 4 = biphasic   Removal Indication and Assessment:    Wound Outcome:    Wound Image    06/06/24 1335   Dressing Appearance Intact;Moist drainage 06/06/24 1335   Drainage Amount Moderate 06/06/24 1335   Drainage Characteristics/Odor Yellow;No odor 06/06/24 1335   Appearance Red;Pink;Yellow 06/06/24 1335   Tissue loss description Full thickness 06/06/24 1335   Black (%), Wound Tissue Color 0 % 06/06/24 1335   Red (%), Wound Tissue Color 30 % 06/06/24 1335   Yellow (%), Wound Tissue Color 70 % 06/06/24 1335   Periwound Area Intact;Dry 06/06/24 1335   Wound Edges Defined 06/06/24 1335   Wound Length (cm) 1.3 cm 06/06/24 1335   Wound Width (cm) 1.1 cm 06/06/24 1335   Wound Depth (cm) 0.2  "cm 06/06/24 1335   Wound Volume (cm^3) 0.286 cm^3 06/06/24 1335   Wound Surface Area (cm^2) 1.43 cm^2 06/06/24 1335   Care Cleansed with:;Wound cleanser;Applied: 06/06/24 1335   Dressing Applied;Honey;Absorptive Pad;Rolled gauze 06/06/24 1415   Periwound Care Absorptive dressing applied 06/06/24 1415   Off Loading Other (see comments) 06/06/24 1415       Labs Reviewed:     Chemistry:  Lab Results   Component Value Date    BUN 14.6 02/22/2024    BUN 19.5 02/02/2024    CREATININE 0.72 (L) 02/22/2024    CREATININE 0.66 (L) 02/02/2024    EGFRNORACEVR >60 02/22/2024    EGFRNORACEVR >60 02/02/2024    GLUCOSE 87 02/22/2024    PREALB 14.4 (L) 02/01/2024    AST 27 02/22/2024    AST 33 02/02/2024    ALT 20 02/22/2024    ALT 20 02/02/2024    HGBA1C 5.2 09/02/2020    HGBA1C 5.3 09/02/2020        Hematology:  Lab Results   Component Value Date    WBC 6.95 05/20/2024    WBC 7.52 02/22/2024    HGB 15.5 05/20/2024    HGB 12.8 (L) 02/22/2024    HCT 48.7 05/20/2024    HCT 39.7 (L) 02/22/2024     05/20/2024     02/22/2024       Inflammatory Markers:  No results found for: "HSCRP", "SEDRATE"     hba1c  Lab Results   Component Value Date    HGBA1C 5.2 09/02/2020    HGBA1C 5.3 09/02/2020         Assessment and Plan:       RLE ulcers: return to clinic on 6/6/24  Prior history of BLE stasis ulcers  Mixed PVD   Atherosclerosis of left leg: managed by Dr Darrian Farnsworth: arterial US jan 2024: RLE: mild right CFA; right PFA, popliteal, PTA, LALITA and DPA with moderate disease; prior angiograms both legs with intervention (last BLE Aug 2023 right LALITA and PTA stent placement; atherectomy and angioplasty of left PTA)  Chronic venous hypertension with multiple venogram with treatment by Dr farnsworth; last one on 2023    Hypertension  Generalized debility, inability to walk since fall Jan 2024  Fall with scalp lac and clavicle fx Jan 2024      Plan of Care:    Patient returns to this clinic after a 6 month absence from his last visit here.  He " ended up having a severe fall causing large scalp laceration and a left clavicle fracture that resulted in a prolonged rehab then a skilled nursing facility stay through the end of May  2024.  Unfortunately he is no longer able to walk at all anymore and is dependent on a wheelchair and Ignacio lift.    He called this clinic himself a couple of days ago asking to return for 2 ulcers on his right leg.  He comes in today on 06/06/24  I extensively reviewed the records available to me in epic  We discussed these 2 ulcers.  Patient states that the wound care nurse at the nursing home was taking care of it and was using silver alginate and collagen but patient felt it was not getting better   I debrided 1 of the 2 ulcers.  No signs of infection.  No cellulitis.  Based on his history he does have prolonged healing difficulties  We will try Medihoney gel and and keep the wounds covered.  He does have home health as well as his wife who will be doing the wound care  Nutrition: Must have a high protein diet to support wound  healing; (if renal disease, see nephrologist for amount allowed):  this should be over 100g protein /day (if no kidney issues); Also rec MVI along with vit C, vit D, zinc and Uri   Return to clinic 2 weeks    The time spent including preparing to see the patient, obtaining patient history and assessment, evaluation of the plan of care, patient/caregiver counseling and education, orders, documentation, coordination of care, and other professional medical management activities for today's encounter was 30 minutes  Time spent performing procedures during today's encounter was 5 minutes.

## 2024-06-06 NOTE — PATIENT INSTRUCTIONS
Pt seen today by: Dr. Guo    [facility] DRESSING INSTRUCTIONS: Home health to see pt 1 x per week.      Wound location: Right Shin      Cleanse wound with wound cleanser or saline  Apply Medihoney ge to wound bed  Cover with abd  Secure with nubia   Change dressing every other day and as needed if soiled or if dressing comes off      Return visit: June 20, 2024 at 11:00 am    Wound may have been debrided in clinic: if so, WHAT YOU NEED TO KNOW:  Debridement is the removal of infected, damaged, or dead tissue so a wound can heal properly. Your wound may need more than one debridement. Debridement can cause bleeding, and a small amount of blood is expected.    AFTER A DEBRIDEMENT:  Keep your wound clean and dry. Do not remove the dressing unless instructed.  2. Follow the wound care orders provided to you or your home health care provider.  3. If you have pain, take over the counter pain relievers or pain medication if prescribed.  4. Elevate the wound and limit excessive activity to prevent bleeding and/or swelling in your wound.  If you see blood coming through the dressing, apply gauze and tape over the dressing and hold firm pressure to the wound with your hand for 5-10 minutes continuously, without peeking, to help the bleeding stop.  Contact [facility] wound care team at [phone] or go to the nearest Emergency department if:  You have a fever greater than 101 taken by mouth.  Your pain gets worse or does not go away, even after taking your regular pain medicine.  Your skin around your wound is red, hot, swollen, or draining pus.  You have bleeding that continues to come through the dressing after holding pressure for 10 minutes     Compression: You may be using a compressive type of dressings to control edema: If so, keep your compression wrap or tubigrip in place. Do not get them wet, they should feel snug. If they feel tight, or cause pain in any way,  elevate your legs above  your heart for 15 minutes. If the wraps still cause pain or you can not tolerate compression,  please remove and notify the clinic or your home health.     Nutrition:  The current daily value (%DV) for protein is 50 grams per day and is meant as a general goal for most people. Further increasing your dietary protein intake is very important for wound healing. Typically one needs over 100g of protein per day to help with wound healing needs.  If you are a dialysis patient or have problems with your kidneys, talk to your Nephrologist about how much protein you can take in with your condition.  Examples of high protein items that can be added to your diet include: eggs, chicken, red meats, almonds, cottage cheese, Greek yogurt, beans, and peanut butter.  Fortified protein bars, shakes and drinks can add 15-30 additional grams of protein per serving.   Also add:   1 daily general multivitamin   Uri : 1 packet twice daily   Vitamin C : 500mg twice daily   Zinc 220 mg daily  Vit D : once daily    Offloading   Offload your wound. This means to reduce pressure on and around the wound that reduces blood flow to the wound and prevents healing. Your wound care team will discuss specific ways for you to offload your specific wound. Common offloading strategies include:  Turn or reposition every 2 hours or sooner  Use pillows, wedges, ROHO wheelchair cushions or other special devices like boots and shoes to lift the wound off of hard surfaces  Alternating Low Air-loss (ALAL) mattress may be ordered  Padded dressings can reduce wound pressure      Call our [facility] wound clinic for questions/concerns a [phone] .

## 2024-06-07 NOTE — PROCEDURES
Debridement    Date/Time: 6/6/2024 12:33 PM    Performed by: Liliana uGo MD  Authorized by: Liliana Guo MD  Associated wounds:        Wound 06/06/24 1336 Venous Ulcer Right anterior;lower;lateral Leg #2  Consent Done?:  Yes (Written)  Local anesthesia used?: Yes    Local anesthetic:  Topical anesthetic    Wound Details:    Location:  Right leg    Type of Debridement:  Excisional       Length (cm):  1.3       Area (sq cm):  1.43       Width (cm):  1.1       Percent Debrided (%):  100       Depth (cm):  0.2       Total Area Debrided (sq cm):  1.43    Depth of debridement:  Subcutaneous tissue    Tissue debrided:  Dermis, Epidermis and Subcutaneous    Devitalized tissue debrided:  Biofilm and Slough    Instruments:  Curette  Bleeding:  Minimal  Hemostasis Achieved: Yes  Method Used:  Pressure  Patient tolerance:  Patient tolerated the procedure well with no immediate complications

## 2024-06-20 ENCOUNTER — HOSPITAL ENCOUNTER (OUTPATIENT)
Dept: WOUND CARE | Facility: HOSPITAL | Age: 89
Discharge: HOME | End: 2024-06-20
Attending: EMERGENCY MEDICINE
Payer: MEDICARE

## 2024-06-20 VITALS
HEART RATE: 82 BPM | HEIGHT: 74 IN | TEMPERATURE: 98 F | DIASTOLIC BLOOD PRESSURE: 64 MMHG | SYSTOLIC BLOOD PRESSURE: 115 MMHG | BODY MASS INDEX: 26.17 KG/M2 | RESPIRATION RATE: 20 BRPM | WEIGHT: 203.94 LBS

## 2024-06-20 DIAGNOSIS — I87.311 IDIOPATHIC CHRONIC VENOUS HYPERTENSION OF RIGHT LEG WITH ULCER: ICD-10-CM

## 2024-06-20 DIAGNOSIS — L97.919 IDIOPATHIC CHRONIC VENOUS HYPERTENSION OF RIGHT LEG WITH ULCER: ICD-10-CM

## 2024-06-20 DIAGNOSIS — I25.10 CORONARY ARTERIOSCLEROSIS: ICD-10-CM

## 2024-06-20 DIAGNOSIS — I70.201 ATHEROSCLEROSIS OF RIGHT LEG: ICD-10-CM

## 2024-06-20 DIAGNOSIS — D64.9 ANEMIA, UNSPECIFIED TYPE: ICD-10-CM

## 2024-06-20 DIAGNOSIS — L97.911 ULCER OF RIGHT LOWER EXTREMITY, LIMITED TO BREAKDOWN OF SKIN: Primary | ICD-10-CM

## 2024-06-20 DIAGNOSIS — I10 HYPERTENSION, UNSPECIFIED TYPE: ICD-10-CM

## 2024-06-20 DIAGNOSIS — Z79.01 CHRONIC ANTICOAGULATION: ICD-10-CM

## 2024-06-20 DIAGNOSIS — I70.202 ATHEROSCLEROSIS OF LEFT LEG: ICD-10-CM

## 2024-06-20 PROCEDURE — 27000999 HC MEDICAL RECORD PHOTO DOCUMENTATION

## 2024-06-20 PROCEDURE — 11042 DBRDMT SUBQ TIS 1ST 20SQCM/<: CPT | Mod: ,,, | Performed by: EMERGENCY MEDICINE

## 2024-06-20 PROCEDURE — 11042 DBRDMT SUBQ TIS 1ST 20SQCM/<: CPT

## 2024-06-20 PROCEDURE — 99499 UNLISTED E&M SERVICE: CPT | Mod: ,,, | Performed by: EMERGENCY MEDICINE

## 2024-06-20 NOTE — PATIENT INSTRUCTIONS
Pt seen today by: Dr. Guo    [facility] DRESSING INSTRUCTIONS: Home health to see pt 1 x per week.      Wound location: Right Shin      Cleanse wound with wound cleanser or saline  Apply Medihoney gel to wound bed  Cover with abd  Secure with nubia   Change dressing every other day and as needed if soiled or if dressing comes off  (Wife requests that we use abd and roll gauze, not bandaids or foam dressings)      Return visit: July 11, 2024 at 1:00 pm    Wound may have been debrided in clinic: if so, WHAT YOU NEED TO KNOW:  Debridement is the removal of infected, damaged, or dead tissue so a wound can heal properly. Your wound may need more than one debridement. Debridement can cause bleeding, and a small amount of blood is expected.    AFTER A DEBRIDEMENT:  Keep your wound clean and dry. Do not remove the dressing unless instructed.  2. Follow the wound care orders provided to you or your home health care provider.  3. If you have pain, take over the counter pain relievers or pain medication if prescribed.  4. Elevate the wound and limit excessive activity to prevent bleeding and/or swelling in your wound.  If you see blood coming through the dressing, apply gauze and tape over the dressing and hold firm pressure to the wound with your hand for 5-10 minutes continuously, without peeking, to help the bleeding stop.  Contact [facility] wound care team at [phone] or go to the nearest Emergency department if:  You have a fever greater than 101 taken by mouth.  Your pain gets worse or does not go away, even after taking your regular pain medicine.  Your skin around your wound is red, hot, swollen, or draining pus.  You have bleeding that continues to come through the dressing after holding pressure for 10 minutes     Compression: You may be using a compressive type of dressings to control edema: If so, keep your compression wrap or tubigrip in place. Do not get them wet, they should  feel snug. If they feel tight, or cause pain in any way,  elevate your legs above your heart for 15 minutes. If the wraps still cause pain or you can not tolerate compression,  please remove and notify the clinic or your home health.     Nutrition:  The current daily value (%DV) for protein is 50 grams per day and is meant as a general goal for most people. Further increasing your dietary protein intake is very important for wound healing. Typically one needs over 100g of protein per day to help with wound healing needs.  If you are a dialysis patient or have problems with your kidneys, talk to your Nephrologist about how much protein you can take in with your condition.  Examples of high protein items that can be added to your diet include: eggs, chicken, red meats, almonds, cottage cheese, Greek yogurt, beans, and peanut butter.  Fortified protein bars, shakes and drinks can add 15-30 additional grams of protein per serving.   Also add:   1 daily general multivitamin   Uri : 1 packet twice daily   Vitamin C : 500mg twice daily   Zinc 220 mg daily  Vit D : once daily    Offloading   Offload your wound. This means to reduce pressure on and around the wound that reduces blood flow to the wound and prevents healing. Your wound care team will discuss specific ways for you to offload your specific wound. Common offloading strategies include:  Turn or reposition every 2 hours or sooner  Use pillows, wedges, ROHO wheelchair cushions or other special devices like boots and shoes to lift the wound off of hard surfaces  Alternating Low Air-loss (ALAL) mattress may be ordered  Padded dressings can reduce wound pressure      Call our [facility] wound clinic for questions/concerns a [phone] .

## 2024-06-20 NOTE — PROGRESS NOTES
Outpatient Wound Care and [facility] Clinic    Subjective:     Patient ID: Donovan Enriquez Jr. is a 89 y.o. male.    Chief Complaint: Wound care        CC: RLE ulcers    88 y/o WM with hypertension, neuropathy, mixed PVD with recurrent leg stasis ulcers, lymphedema, CAD, cardiomegaly, asthma on chronic  Plavix , prior GI bleeds, and anemia . I began to treat Mr Enriquez back in the summer of 2023 when he was referred after a rehab stay to help treat a mulitude of LLE stasis ulcers.  His vascular treatment is per Dr Darrian Higgins who has done angiograms and venograms . When I treated him, he had a distal LLE anterior leg ulcer and a left calf ulcer.  Both healed and he was discharged in early November 2023.  He did return to this clinic on 1/4/24 when he noted a new large bulla entity noted on right anterior shin as well an ulcer on distal left leg . It was improving but I last  saw this patient on 1/25/24: he never returned. Evidentally fell a few days after we last saw him and sustained a scalp lac and a left clavicle fx and has been in rehab/SNF until the end of May 2024.  He called a couple of days ago asking to return. He comes in today on 6/6/24 with 2   ulcers on right anterior leg. He said a wound care nurse practitioner at the [facility] nursing [facility] was treating it for the past couple of months.  Said they were using alginate and/or collagen but they still remain open so he wanted to return here.  He states that he lost the ability to walk since his fall in his depends on wheelchairs and Ignacio lift.  He lives with his wife who is still pretty active.  He also has sitters in the morning and in the evening to help.    I debrided the 2 wounds in the right leg and advised to change the dressing to Medihoney gel.  He comes in today on June 20th for recheck and the proximal 1 has closed and the distal 1 is smaller.  He is very pleased.        Review of Systems   Constitutional: Negative.    HENT: Negative.    "  Respiratory: Negative.     Cardiovascular: Negative.    Gastrointestinal: Negative.    Musculoskeletal: Negative.    Skin:  Positive for wound.   Neurological:  Positive for weakness (No longer can walk as of ).       Objective:     Visit Vitals  /64 (BP Location: Left arm, Patient Position: Sitting)   Pulse 82   Temp 97.9 °F (36.6 °C) (Oral)   Resp 20   Ht 6' 2" (1.88 m)   Wt 92.5 kg (203 lb 14.8 oz)   BMI 26.18 kg/m²         Physical Exam  Vitals reviewed.   Constitutional:       Comments: Elderly WM; looks more frail   Cardiovascular:      Pulses:           Dorsalis pedis pulses are detected w/ Doppler on the right side and detected w/ Doppler on the left side.      Comments: No ischemic changes to foot  Pulmonary:      Effort: Pulmonary effort is normal.   Musculoskeletal:      Right lower le+ Edema present.      Left lower le+ Edema present.        Legs:    Skin:     General: Skin is warm and dry.      Capillary Refill: Capillary refill takes less than 2 seconds.   Neurological:      General: No focal deficit present.      Mental Status: He is alert and oriented to person, place, and time. Mental status is at baseline.            Wound 24 1336 Venous Ulcer Right anterior;lower;lateral Leg #2 (Active)   24 1336   Present on Original Admission: Y   Primary Wound Type: Venous ulcer   Side: Right   Orientation: anterior;lower;lateral   Location: Leg   Wound Approximate Age at First Assessment (Weeks):    Wound Number: #2   Is this injury device related?:    Incision Type:    Closure Method:    Wound Description (Comments):    Type:    Additional Comments:    Ankle-Brachial Index: santa done 24 = right dp = 0.95, right pt = 1.02 / left dp = 0.79, left pt = 0.77   Pulses: + palpable, + doppler x 4 = biphasic   Removal Indication and Assessment:    Wound Outcome:    Wound Image   24 1112   Dressing Appearance Intact;Moist drainage 24 1112   Drainage Amount Small 24 " 1112   Drainage Characteristics/Odor Serosanguineous;Yellow;No odor 06/20/24 1112   Appearance Red;Yellow 06/20/24 1112   Tissue loss description Full thickness 06/20/24 1112   Black (%), Wound Tissue Color 0 % 06/20/24 1112   Red (%), Wound Tissue Color 90 % 06/20/24 1112   Yellow (%), Wound Tissue Color 10 % 06/20/24 1112   Periwound Area Intact;Dry 06/20/24 1112   Wound Edges Defined 06/20/24 1112   Wound Length (cm) 0.8 cm 06/20/24 1112   Wound Width (cm) 0.5 cm 06/20/24 1112   Wound Depth (cm) 0.2 cm 06/20/24 1112   Wound Volume (cm^3) 0.08 cm^3 06/20/24 1112   Wound Surface Area (cm^2) 0.4 cm^2 06/20/24 1112   Care Cleansed with:;Antimicrobial agent;Applied: 06/20/24 1112   Dressing Applied;Honey;Absorptive Pad;Rolled gauze 06/20/24 1450   Periwound Care Absorptive dressing applied 06/20/24 1450       [REMOVED]      Wound 06/06/24 1334 Venous Ulcer Right anterior;lower;proximal Leg #1 (Removed)   06/06/24 1334   Present on Original Admission: Y   Primary Wound Type: Venous ulcer   Side: Right   Orientation: anterior;lower;proximal   Location: Leg   Wound Approximate Age at First Assessment (Weeks):    Wound Number: #1   Is this injury device related?: No   Incision Type:    Closure Method:    Wound Description (Comments):    Type:    Additional Comments:    Ankle-Brachial Index: santa done 6/6/24 = right dp = 0.95, right pt  = 1.02 / left dp = 0.79, left pt = 0.77   Pulses: + palpable, + doppler x 4 = biphasic   Removal Indication and Assessment:    Wound Outcome: Healed   Removed 06/20/24 1450   Wound Image   06/20/24 1112   Dressing Appearance Intact;Dry 06/20/24 1112   Drainage Amount None 06/20/24 1112   Appearance Epithelialization 06/20/24 1112   Wound Length (cm) 0 cm 06/20/24 1112   Wound Width (cm) 0 cm 06/20/24 1112   Wound Depth (cm) 0 cm 06/20/24 1112   Wound Volume (cm^3) 0 cm^3 06/20/24 1112   Wound Surface Area (cm^2) 0 cm^2 06/20/24 1112   Care Cleansed with:;Antimicrobial agent 06/20/24 1112  "      Labs Reviewed:     Chemistry:  Lab Results   Component Value Date    BUN 14.6 02/22/2024    BUN 19.5 02/02/2024    CREATININE 0.72 (L) 02/22/2024    CREATININE 0.66 (L) 02/02/2024    EGFRNORACEVR >60 02/22/2024    EGFRNORACEVR >60 02/02/2024    GLUCOSE 87 02/22/2024    PREALB 14.4 (L) 02/01/2024    AST 27 02/22/2024    AST 33 02/02/2024    ALT 20 02/22/2024    ALT 20 02/02/2024    HGBA1C 5.2 09/02/2020    HGBA1C 5.3 09/02/2020        Hematology:  Lab Results   Component Value Date    WBC 6.95 05/20/2024    WBC 7.52 02/22/2024    HGB 15.5 05/20/2024    HGB 12.8 (L) 02/22/2024    HCT 48.7 05/20/2024    HCT 39.7 (L) 02/22/2024     05/20/2024     02/22/2024       Inflammatory Markers:  No results found for: "HSCRP", "SEDRATE"     hba1c  Lab Results   Component Value Date    HGBA1C 5.2 09/02/2020    HGBA1C 5.3 09/02/2020         Assessment and Plan:       RLE ulcers: return to clinic on 6/6/24  Prior history of BLE stasis ulcers  Mixed PVD   Atherosclerosis of left leg: managed by Dr Darrian Farnsworth: arterial US jan 2024: RLE: mild right CFA; right PFA, popliteal, PTA, LALITA and DPA with moderate disease; prior angiograms both legs with intervention (last BLE Aug 2023 right LALITA and PTA stent placement; atherectomy and angioplasty of left PTA)  Chronic venous hypertension with multiple venogram with treatment by Dr farnsworth; last one on 2023    Hypertension  Generalized debility, inability to walk since fall Jan 2024  Fall with scalp lac and clavicle fx Jan 2024      Plan of Care:    Patient returned to this clinic last week on June 13th after a 6 month absence from his last visit here.  He ended up having a severe fall causing large scalp laceration and a left clavicle fracture that resulted in a prolonged rehab then a skilled nursing facility stay through the end of May  2024.  Unfortunately he is no longer able to walk at all anymore and is dependent on a wheelchair and Ignacio lift.    He came in with 2 " wounds on his distal right leg.  He felt that his current wound care of silver alginate and collagen was not helping so we changed it to Medihoney gel.  He comes back today on June 20th and the proximal ulcer has healed and the distal ulcer has improved.  I debrided the distal ulcer  continue Medihoney gel   Nutrition: Must have a high protein diet to support wound  healing; (if renal disease, see nephrologist for amount allowed):  this should be over 100g protein /day (if no kidney issues); Also rec MVI along with vit C, vit D, zinc and Uri   Return to clinic 3 weeks

## 2024-06-20 NOTE — PROCEDURES
Debridement    Date/Time: 6/20/2024 10:36 AM    Performed by: Liliana Guo MD  Authorized by: Liliana Guo MD  Associated wounds:        Wound 06/06/24 1336 Venous Ulcer Right anterior;lower;lateral Leg #2  Consent Done?:  Yes (Written)  Local anesthesia used?: Yes    Local anesthetic:  Topical anesthetic    Wound Details:    Location:  Right leg    Type of Debridement:  Excisional       Length (cm):  0.8       Area (sq cm):  0.4       Width (cm):  0.5       Percent Debrided (%):  100       Depth (cm):  0.2       Total Area Debrided (sq cm):  0.4    Depth of debridement:  Subcutaneous tissue    Tissue debrided:  Dermis, Epidermis and Subcutaneous    Devitalized tissue debrided:  Biofilm and Slough    Instruments:  Curette  Bleeding:  Minimal  Hemostasis Achieved: Yes  Method Used:  Pressure  Patient tolerance:  Patient tolerated the procedure well with no immediate complications

## 2024-07-11 ENCOUNTER — HOSPITAL ENCOUNTER (OUTPATIENT)
Dept: WOUND CARE | Facility: HOSPITAL | Age: 89
Discharge: HOME | End: 2024-07-11
Attending: EMERGENCY MEDICINE
Payer: MEDICARE

## 2024-07-11 VITALS
RESPIRATION RATE: 20 BRPM | SYSTOLIC BLOOD PRESSURE: 122 MMHG | HEART RATE: 76 BPM | WEIGHT: 203.94 LBS | BODY MASS INDEX: 26.17 KG/M2 | HEIGHT: 74 IN | DIASTOLIC BLOOD PRESSURE: 70 MMHG | TEMPERATURE: 98 F

## 2024-07-11 DIAGNOSIS — I10 HYPERTENSION, UNSPECIFIED TYPE: ICD-10-CM

## 2024-07-11 DIAGNOSIS — I70.202 ATHEROSCLEROSIS OF LEFT LEG: ICD-10-CM

## 2024-07-11 DIAGNOSIS — L97.911 ULCER OF RIGHT LOWER EXTREMITY, LIMITED TO BREAKDOWN OF SKIN: Primary | ICD-10-CM

## 2024-07-11 DIAGNOSIS — Z79.01 CHRONIC ANTICOAGULATION: ICD-10-CM

## 2024-07-11 DIAGNOSIS — L97.919 IDIOPATHIC CHRONIC VENOUS HYPERTENSION OF RIGHT LEG WITH ULCER: ICD-10-CM

## 2024-07-11 DIAGNOSIS — I70.201 ATHEROSCLEROSIS OF RIGHT LEG: ICD-10-CM

## 2024-07-11 DIAGNOSIS — D64.9 ANEMIA, UNSPECIFIED TYPE: ICD-10-CM

## 2024-07-11 DIAGNOSIS — I87.311 IDIOPATHIC CHRONIC VENOUS HYPERTENSION OF RIGHT LEG WITH ULCER: ICD-10-CM

## 2024-07-11 DIAGNOSIS — L89.106 PRESSURE INJURY OF DEEP TISSUE OF BACK: ICD-10-CM

## 2024-07-11 PROCEDURE — 27000999 HC MEDICAL RECORD PHOTO DOCUMENTATION

## 2024-07-11 PROCEDURE — 99213 OFFICE O/P EST LOW 20 MIN: CPT | Mod: ,,, | Performed by: EMERGENCY MEDICINE

## 2024-07-11 PROCEDURE — 99213 OFFICE O/P EST LOW 20 MIN: CPT

## 2024-07-11 NOTE — PATIENT INSTRUCTIONS
Pt seen today by: Dr. Guo    [facility] self care DRESSING INSTRUCTIONS: Home health to see pt 1 x per week.      Wound location: Right Shin      Cleanse wound with wound cleanser or saline  Apply Medihoney gel to wound bed  Cover with abd  Secure with nubia   Change dressing every other day and as needed if soiled or if dressing comes off  (Wife requests that we use abd and roll gauze, not bandaids or foam dressings)      Mid-back:    Try adding a piece of egg crate foam to the back of his wheelchair to help with pressure  Can also try adding a piece of abd pad or fluffed 4x4 under the foam bordered dressing for extra protection and padding      Return visit: August 8, 2024 at 1:00 pm    Wound may have been debrided in clinic: if so, WHAT YOU NEED TO KNOW:  Debridement is the removal of infected, damaged, or dead tissue so a wound can heal properly. Your wound may need more than one debridement. Debridement can cause bleeding, and a small amount of blood is expected.    AFTER A DEBRIDEMENT:  Keep your wound clean and dry. Do not remove the dressing unless instructed.  2. Follow the wound care orders provided to you or your home health care provider.  3. If you have pain, take over the counter pain relievers or pain medication if prescribed.  4. Elevate the wound and limit excessive activity to prevent bleeding and/or swelling in your wound.  If you see blood coming through the dressing, apply gauze and tape over the dressing and hold firm pressure to the wound with your hand for 5-10 minutes continuously, without peeking, to help the bleeding stop.  Contact [facility] wound care team at [phone] or go to the nearest Emergency department if:  You have a fever greater than 101 taken by mouth.  Your pain gets worse or does not go away, even after taking your regular pain medicine.  Your skin around your wound is red, hot, swollen, or draining pus.  You have bleeding that continues to come  through the dressing after holding pressure for 10 minutes     Compression: You may be using a compressive type of dressings to control edema: If so, keep your compression wrap or tubigrip in place. Do not get them wet, they should feel snug. If they feel tight, or cause pain in any way,  elevate your legs above your heart for 15 minutes. If the wraps still cause pain or you can not tolerate compression,  please remove and notify the clinic or your home health.     Nutrition:  The current daily value (%DV) for protein is 50 grams per day and is meant as a general goal for most people. Further increasing your dietary protein intake is very important for wound healing. Typically one needs over 100g of protein per day to help with wound healing needs.  If you are a dialysis patient or have problems with your kidneys, talk to your Nephrologist about how much protein you can take in with your condition.  Examples of high protein items that can be added to your diet include: eggs, chicken, red meats, almonds, cottage cheese, Greek yogurt, beans, and peanut butter.  Fortified protein bars, shakes and drinks can add 15-30 additional grams of protein per serving.   Also add:   1 daily general multivitamin   Uri : 1 packet twice daily   Vitamin C : 500mg twice daily   Zinc 220 mg daily  Vit D : once daily    Offloading   Offload your wound. This means to reduce pressure on and around the wound that reduces blood flow to the wound and prevents healing. Your wound care team will discuss specific ways for you to offload your specific wound. Common offloading strategies include:  Turn or reposition every 2 hours or sooner  Use pillows, wedges, ROHO wheelchair cushions or other special devices like boots and shoes to lift the wound off of hard surfaces  Alternating Low Air-loss (ALAL) mattress may be ordered  Padded dressings can reduce wound pressure      Call our [facility] wound clinic for questions/concerns a [phone] .

## 2024-07-11 NOTE — PROGRESS NOTES
Outpatient Wound Care and Hyperbaric Clinic    Subjective:     Patient ID: Donovan Enriquez Jr. is a 89 y.o. male.    Chief Complaint: Wound care        CC: RLE ulcers    89 y/o WM with hypertension, neuropathy, mixed PVD with recurrent leg stasis ulcers, lymphedema, CAD, cardiomegaly, asthma on chronic  Plavix , prior GI bleeds, and anemia . I began to treat Mr Enriquez back in the summer of 2023 when he was referred after a rehab stay to help treat a mulitude of LLE stasis ulcers.  His vascular treatment is per Dr Darrian Higgins who has done angiograms and venograms . When I treated him, he had a distal LLE anterior leg ulcer and a left calf ulcer.  Both healed and he was discharged in early November 2023.  He did return to this clinic on 1/4/24 when he noted a new large bulla entity noted on right anterior shin as well an ulcer on distal left leg . It was improving but I last  saw this patient on 1/25/24: he never returned. Evidentally fell a few days after we last saw him and sustained a scalp lac and a left clavicle fx and has been in rehab/SNF until the end of May 2024.  He called a couple of days ago asking to return. He comes in today on 6/6/24 with 2   ulcers on right anterior leg. He said a wound care nurse practitioner at the [facility] nursing [facility] was treating it for the past couple of months.  Said they were using alginate and/or collagen but they still remain open so he wanted to return here.  He states that he lost the ability to walk since his fall in his depends on wheelchairs and Ignacio lift.  He lives with his wife who is still pretty active.  He also has sitters in the morning and in the evening to help.    I debrided the 2 wounds in the right leg and advised to change the dressing to Medihoney gel.  On his follow-up visit on 06/20 the proximal wound had closed in the distal ulcer was smaller.  We continue this care and he comes in today on 07/11 for recheck.  He states that Dr. Thierry Hunter saw him this  "week and wanted us to look at his back since he has chronic signs of pressure on this area.  Patient states that he only gets in his bed at nighttime with a Ignacio lift with the assistance of sitters.  Other than that he sits in his wheelchair all day long.  He does put a pillow behind his back because he says he is battled pressure issues here along the mid spine before        Review of Systems   Constitutional: Negative.    HENT: Negative.     Respiratory: Negative.     Cardiovascular: Negative.    Gastrointestinal: Negative.    Musculoskeletal: Negative.    Skin:  Positive for wound.   Neurological:  Positive for weakness (No longer can walk as of ).       Objective:     Visit Vitals  /70 (BP Location: Right arm, Patient Position: Sitting)   Pulse 76   Temp 98 °F (36.7 °C) (Oral)   Resp 20   Ht 6' 2" (1.88 m)   Wt 92.5 kg (203 lb 14.8 oz)   BMI 26.18 kg/m²           Physical Exam  Vitals reviewed.   Constitutional:       Comments: Elderly WM; looks more frail   Cardiovascular:      Pulses:           Dorsalis pedis pulses are detected w/ Doppler on the right side and detected w/ Doppler on the left side.      Comments: No ischemic changes to foot  Pulmonary:      Effort: Pulmonary effort is normal.   Musculoskeletal:        Back:       Right lower le+ Edema present.      Left lower le+ Edema present.        Legs:    Skin:     General: Skin is warm and dry.      Capillary Refill: Capillary refill takes less than 2 seconds.   Neurological:      General: No focal deficit present.      Mental Status: He is alert and oriented to person, place, and time. Mental status is at baseline.      Comments: He is extremely stiff and immobile and requires full assistance to be turned in the bed            Wound 24 1336 Venous Ulcer Right anterior;lower;lateral Leg #2 (Active)   24 1336   Present on Original Admission: Y   Primary Wound Type: Venous ulcer   Side: Right   Orientation: " anterior;lower;lateral   Location: Leg   Wound Approximate Age at First Assessment (Weeks):    Wound Number: #2   Is this injury device related?:    Incision Type:    Closure Method:    Wound Description (Comments):    Type:    Additional Comments:    Ankle-Brachial Index: santa done 6/6/24 = right dp = 0.95, right pt = 1.02 / left dp = 0.79, left pt = 0.77   Pulses: + palpable, + doppler x 4 = biphasic   Removal Indication and Assessment:    Wound Outcome:    Wound Image    07/11/24 1303   Dressing Appearance Intact;Dried drainage 07/11/24 1303   Drainage Amount None 07/11/24 1303   Drainage Characteristics/Odor Yellow;No odor 07/11/24 1303   Appearance Yellow;Eschar;Pink 07/11/24 1303   Tissue loss description Full thickness 07/11/24 1303   Black (%), Wound Tissue Color 0 % 07/11/24 1303   Red (%), Wound Tissue Color 10 % 07/11/24 1303   Yellow (%), Wound Tissue Color 90 % 07/11/24 1303   Periwound Area Intact;Dry 07/11/24 1303   Wound Edges Defined 07/11/24 1303   Wound Length (cm) 1.1 cm 07/11/24 1303   Wound Width (cm) 0.7 cm 07/11/24 1303   Wound Depth (cm) 0 cm 07/11/24 1303   Wound Volume (cm^3) 0 cm^3 07/11/24 1303   Wound Surface Area (cm^2) 0.77 cm^2 07/11/24 1303   Care Cleansed with:;Antimicrobial agent;Applied: 07/11/24 1303   Dressing Applied;Honey;Absorptive Pad;Rolled gauze 07/11/24 1346   Periwound Care Absorptive dressing applied 07/11/24 1346              Assessment and Plan:       RLE ulcers: return to clinic on 6/6/24  Prior history of BLE stasis ulcers  Mixed PVD   Atherosclerosis of left leg: managed by Dr Darrian Farnsworth: arterial US jan 2024: RLE: mild right CFA; right PFA, popliteal, PTA, LALITA and DPA with moderate disease; prior angiograms both legs with intervention (last BLE Aug 2023 right LALITA and PTA stent placement; atherectomy and angioplasty of left PTA)  Chronic venous hypertension with multiple venogram with treatment by Dr farnsworth; last one on 2023    Hypertension  Generalized debility,  inability to walk since fall Jan 2024  Fall with scalp lac and clavicle fx Jan 2024  Spinal deep tissue injury, acute on chronic, noted 7/11/2 4      Plan of Care:    Right leg stasis ulcer remains on the distal side of his leg.  Stable, no signs of infection  Patient is unable to walk at all and is dependent on his wheelchair and a Ignacio lift to get into bed.  He says that he is placed in the wheelchair in the morning by sitters and then placed in the bed at nighttime by the sitters via the Ignacio lift.  Other than that he stays in his wheelchair all day long and he does have a prominent spine that has had bruising in the past he said because of the pressure.  He does put a pillow to try to offload from the back of the wheelchair.  I advised to keep this area padded and covered and to add an egg crate type foam as well for cushioning.  Ideally he would be out of the chair but he says that is impossible  continue Medihoney gel   Nutrition: Must have a high protein diet to support wound  healing; (if renal disease, see nephrologist for amount allowed):  this should be over 100g protein /day (if no kidney issues); Also rec MVI along with vit C, vit D, zinc and Uri   Return to clinic next month     The time spent including preparing to see the patient, obtaining patient history and assessment, evaluation of the plan of care, patient/caregiver counseling and education, orders, documentation, coordination of care, and other professional medical management activities for today's encounter was 25 minutes.

## 2024-08-08 ENCOUNTER — HOSPITAL ENCOUNTER (OUTPATIENT)
Dept: WOUND CARE | Facility: HOSPITAL | Age: 89
Discharge: HOME | End: 2024-08-08
Attending: EMERGENCY MEDICINE
Payer: MEDICARE

## 2024-08-08 VITALS
RESPIRATION RATE: 20 BRPM | WEIGHT: 200 LBS | DIASTOLIC BLOOD PRESSURE: 64 MMHG | BODY MASS INDEX: 25.67 KG/M2 | HEART RATE: 71 BPM | HEIGHT: 74 IN | SYSTOLIC BLOOD PRESSURE: 106 MMHG | TEMPERATURE: 98 F

## 2024-08-08 DIAGNOSIS — I87.311 IDIOPATHIC CHRONIC VENOUS HYPERTENSION OF RIGHT LEG WITH ULCER: ICD-10-CM

## 2024-08-08 DIAGNOSIS — L97.919 IDIOPATHIC CHRONIC VENOUS HYPERTENSION OF RIGHT LEG WITH ULCER: ICD-10-CM

## 2024-08-08 DIAGNOSIS — L97.911 ULCER OF RIGHT LOWER EXTREMITY, LIMITED TO BREAKDOWN OF SKIN: Primary | ICD-10-CM

## 2024-08-08 PROCEDURE — 27000999 HC MEDICAL RECORD PHOTO DOCUMENTATION

## 2024-08-08 PROCEDURE — 99212 OFFICE O/P EST SF 10 MIN: CPT

## 2024-08-08 PROCEDURE — 99212 OFFICE O/P EST SF 10 MIN: CPT | Mod: ,,, | Performed by: EMERGENCY MEDICINE

## 2024-08-08 RX ORDER — MUPIROCIN 20 MG/G
OINTMENT TOPICAL 3 TIMES DAILY
COMMUNITY

## 2025-02-20 NOTE — PROGRESS NOTES
Patient called the office saying Dr. Tolentino put in a referral to see an ophthalmologist and said she hasn't heard anything. I told her she has to give it a little time but someone will contact her to schedule an appointment soon.   Subjective:       Patient ID: Donovan Enriquez is a 88 y.o. male.    Chief Complaint: No chief complaint on file.      CC: LLE ulcers      87 y/o WM referred to this [facility] from a rehab facility. This patient has a history of hypertension, neuropathy,mixed PVD with ulcers, lymphedema, CAD, cardiomegaly, asthma on chronic  Plavix who was admitted to [facility] on 6/9/23 for shortness of breath and hypoxia.  He was ultimately diagnosed with a GI bleed and severe anemia.  He was treated with 4 blood transfusions and a GI workup with upper scope and intervention.  He was then transferred to [facility]/ [facility] on 6/12/23 where he stayed though  6/26/23.  It was noted upon arrival to [facility] he had multiple LLE ulcers which had been present all of 2023, maybe longer.. +wound cx for Pseudomonas so was  given cipro 500mg bid on 6/25/23.   Dr Higgins has done both angiograms and venograms in the past.   Search of Epic: note April 2022 by Dr ANKIT Higgins    Angiogram: PTA of the left tibioperoneal trunk, posterior tibial artery and anterior tibial artery.   Venogram :right-sided iliofemoral venous compression syndrome noted:  right external iliac vein, common iliac vein PTV and stenting    I debrided and advised of dressings of gentamicin ointment under moistened hydrofera blue; And referred back to [facility]. Pt declined home health and said his wife would do the dressings  I have been seeing weekly since then and ulcers have improved.  Dr Higgins did do an angiogram or venogram on LLE; pt had reported for some reason Right big toe looked dusky after that (opposite leg) which has since improved. Pt reported on 8/30/23, he had another angio or veno on RLE but we have no records.  On 11/2/23:  Calf ulcer  healed but the anterior side has crusted over again so I had him add mupirocin ointment under bandaid        Review of Systems   Constitutional: Negative.    HENT: Negative.     Respiratory: Negative.    "  Gastrointestinal: Negative.    Neurological: Negative.          Objective:      Vitals:    23 1324   BP: 121/61   Pulse: 65   Resp: 18   Temp: 97.9 °F (36.6 °C)       @poctglucose@  No results for input(s): "POCTGLUCOSE" in the last 24 hours.  Physical Exam  Vitals reviewed.   Constitutional:       Comments: Elderly ; no distress   Cardiovascular:      Pulses:           Dorsalis pedis pulses are 1+ on the right side and 1+ on the left side.   Pulmonary:      Effort: Pulmonary effort is normal.   Musculoskeletal:      Right lower le+ Pitting Edema present.      Left lower le+ Pitting Edema present.        Legs:    Skin:     General: Skin is warm and dry.      Capillary Refill: Capillary refill takes less than 2 seconds.   Neurological:      General: No focal deficit present.      Mental Status: He is alert and oriented to person, place, and time. Mental status is at baseline.              Altered Skin Integrity 23 1416 Left anterior;lower Leg #1 Venous Ulcer (Active)   23 1416   Altered Skin Integrity Present on Admission - Did Patient arrive to the hospital with altered skin?: yes   Side: Left   Orientation: anterior;lower   Location: Leg   Wound Number: #1   Is this injury device related?: No   Primary Wound Type: Venous ulcer   Description of Altered Skin Integrity:    Ankle-Brachial Index: ABIs done on 23 L dp 0.99 pt 0.66   Pulses: Palpable X2; + Doppler = biphasic   Removal Indication and Assessment:    Wound Outcome:    (Retired) Wound Length (cm):    (Retired) Wound Width (cm):    (Retired) Depth (cm):    Wound Description (Comments):    Removal Indications:    Wound Image   23 1328   Dressing Appearance Intact 23 1328   Drainage Amount None 23 1328   Appearance Epithelialization 23 1328   Tissue loss description Partial thickness 23 132   Black (%), Wound Tissue Color 0 % 23 1328   Red (%), Wound Tissue Color 0 % 23 132   Yellow " (%), Wound Tissue Color 0 % 11/16/23 1328   Periwound Area Intact 11/16/23 1328   Wound Length (cm) 0 cm 11/16/23 1328   Wound Width (cm) 0 cm 11/16/23 1328   Wound Depth (cm) 0 cm 11/16/23 1328   Wound Volume (cm^3) 0 cm^3 11/16/23 1328   Wound Surface Area (cm^2) 0 cm^2 11/16/23 1328   Care Cleansed with:;Soap and water;Wound cleanser;Applied: 11/16/23 1328   Dressing Bandaid 11/16/23 1328            Altered Skin Integrity 07/06/23 1417 Left lower;posterior Calf #2 Venous Ulcer (Active)   07/06/23 1417   Altered Skin Integrity Present on Admission - Did Patient arrive to the hospital with altered skin?: yes   Side: Left   Orientation: lower;posterior   Location: Calf   Wound Number: #2   Is this injury device related?: No   Primary Wound Type: Venous ulcer   Description of Altered Skin Integrity:    Ankle-Brachial Index: santa done 10/19/23 - Left dp = 1.04, pt = 0.96   Pulses: palpable x2; + Doppler- biphasic x4   Removal Indication and Assessment:    Wound Outcome:    (Retired) Wound Length (cm):    (Retired) Wound Width (cm):    (Retired) Depth (cm):    Wound Description (Comments):    Removal Indications:    Wound Image   11/16/23 1330   Dressing Appearance Intact 11/16/23 1330   Drainage Amount Small 11/16/23 1330   Drainage Characteristics/Odor Yellow 11/16/23 1330   Appearance Pink 11/16/23 1330   Tissue loss description Partial thickness 11/16/23 1330   Black (%), Wound Tissue Color 0 % 11/16/23 1330   Red (%), Wound Tissue Color 100 % 11/16/23 1330   Yellow (%), Wound Tissue Color 0 % 11/16/23 1330   Periwound Area Intact 11/16/23 1330   Wound Edges Defined 11/16/23 1330   Wound Length (cm) 0.2 cm 11/16/23 1330   Wound Width (cm) 0.1 cm 11/16/23 1330   Wound Depth (cm) 0.1 cm 11/16/23 1330   Wound Volume (cm^3) 0.002 cm^3 11/16/23 1330   Wound Surface Area (cm^2) 0.02 cm^2 11/16/23 1330   Care Cleansed with:;Soap and water;Wound cleanser;Applied: 11/16/23 1330   Dressing Bandaid 11/16/23 1330              Assessment:       1. 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 Chronic venous hypertension with ulcer and inflammation, left    4. Atherosclerosis of left leg          LLE ulcers/chronic since late 2022 per patient: first clinic visit 6/29/23: healed by November 2023   Anemia with severe GI Bleed early June 2023: treated at [facility]: multiple blood transfusions, upper GI with intervention  Mixed PVD: treated by Dr Higgins: 2022 Lower extremity angiography and venogram because of chronic LLE ulcers; h/o ulcer both legs/ankles in past   Angiogram 2022: PTA of the left tibioperoneal trunk, posterior tibial artery and anterior tibial artery.   Another LLE angiogram (or venogram) on 8/23/23: need records  RLE procedure with [facility] end of Aug: need records  Venogram 2022 :right-sided iliofemoral venous compression syndrome noted:  right external iliac vein, common iliac vein PTV and stenting  Anemia with severe GI Bleed early June 2023: treated at [facility]: multiple blood transfusions, upper GI with intervention  Chronic plavix therapy  CAD  Asthma  Hypertension      Plan:     Plan of Care:    Can continue to protect the two areas with bandaid  Return as needed, otherwise will d/c at this time        The time spent including preparing to see the patient, obtaining patient history and assessment, evaluation of the plan of care, patient/caregiver counseling and education, orders, documentation, coordination of care, and other professional medical management activities for today's encounter was 15 minutes.